# Patient Record
Sex: FEMALE | Race: WHITE | NOT HISPANIC OR LATINO | Employment: OTHER | URBAN - METROPOLITAN AREA
[De-identification: names, ages, dates, MRNs, and addresses within clinical notes are randomized per-mention and may not be internally consistent; named-entity substitution may affect disease eponyms.]

---

## 2017-03-16 NOTE — PROGRESS NOTES
SUBJECTIVE:                                                            Nina Villalpando is a 86 year old female who presents for Preventive Visit.    Are you in the first 12 months of your Medicare coverage?  No    Physical   Annual:     Getting at least 3 servings of Calcium per day::  Yes    Bi-annual eye exam::  NO    Dental care twice a year::  NO    Sleep apnea or symptoms of sleep apnea::  None    Diet::  Regular (no restrictions)    Frequency of exercise::  None    Taking medications regularly::  Yes    Medication side effects::  None    Additional concerns today::  No      COGNITIVE SCREEN  1) Repeat 3 items (Banana, Sunrise, Chair)    2) Clock draw: NORMAL  3) 3 item recall: Recalls 3 objects  Results: 3 items recalled: COGNITIVE IMPAIRMENT LESS LIKELY    Mini-CogTM Copyright S Viraj. Licensed by the author for use in [facility] [employer]; reprinted with permission (janet@[facility]). All rights reserved.        Reviewed and updated as needed this visit by clinical staff  Tobacco  Allergies  Meds  Problems  Med Hx  Surg Hx  Fam Hx  Soc Hx          Reviewed and updated as needed this visit by Provider  Allergies  Meds  Problems        Social History   Substance Use Topics     Smoking status: Never Smoker     Smokeless tobacco: Never Used      Comment: no smokers in household     Alcohol use Yes      Comment: marcelo/waterx1-2/x1-2 q couple of months       The patient does not drink >3 drinks per day nor >7 drinks per week.        Today's PHQ-2 Score:   PHQ-2 ( 1999 Pfizer) 8/26/2016   Q1: Little interest or pleasure in doing things 0   Q2: Feeling down, depressed or hopeless 0   PHQ-2 Score 0       Do you feel safe in your environment - Yes    Do you have a Health Care Directive?: No: Advance care planning reviewed with patient; information given to patient to review.    Current providers sharing in care for this patient include:   Patient Care Team:  Patricia Norman MD as PCP -  "General      Hearing impairment: No    Ability to successfully perform activities of daily living: Yes, no assistance needed     Fall risk:  Completed 7 months ago    Home safety:  none identified      The following health maintenance items are reviewed in Epic and correct as of today:  Health Maintenance   Topic Date Due     ADVANCE DIRECTIVE PLANNING Q5 YRS (NO INBASKET)  10/27/2016     FALL RISK ASSESSMENT  07/29/2017     BMP Q1 YR (NO INBASKET)  08/26/2017     LIPID MONITORING Q1 YEAR( NO INBASKET)  08/26/2017     INFLUENZA VACCINE (SYSTEM ASSIGNED)  09/01/2017     TETANUS IMMUNIZATION (SYSTEM ASSIGNED)  10/13/2019     DEXA Q5 YR  08/26/2021     PNEUMOCOCCAL  Completed       ROS:  C: NEGATIVE for fever, chills, change in weight  I: NEGATIVE for worrisome rashes, moles or lesions  E: NEGATIVE for vision changes or irritation  E/M: complains of post nasal drip, nasal congestion, feels it has been going on for over a year.  Nose sprays give her headaches.  R: NEGATIVE for significant cough or SOB  B: NEGATIVE for masses, tenderness or discharge  CV: NEGATIVE for chest pain, palpitations or peripheral edema  GI: NEGATIVE for nausea, abdominal pain, heartburn, or change in bowel habits  : NEGATIVE for frequency, dysuria, or hematuria  M: NEGATIVE for significant arthralgias or myalgia  N: NEGATIVE for weakness, dizziness or paresthesias  E: NEGATIVE for temperature intolerance, skin/hair changes  H: NEGATIVE for bleeding problems  P: NEGATIVE for changes in mood or affect    OBJECTIVE:                                                            /72  Pulse 64  Temp 97.8  F (36.6  C) (Temporal)  Resp 16  Wt 172 lb (78 kg)  BMI 33.75 kg/m2 Estimated body mass index is 33.75 kg/(m^2) as calculated from the following:    Height as of 8/26/16: 4' 11.86\" (1.52 m).    Weight as of this encounter: 172 lb (78 kg).  EXAM:   GENERAL: healthy, alert and no distress  EYES: Eyes grossly normal to inspection, PERRL and " "conjunctivae and sclerae normal  HENT: ear canals and TM's normal, nose and mouth without ulcers or lesions  NECK: no adenopathy, no asymmetry, masses, or scars and thyroid normal to palpation  RESP: lungs clear to auscultation - no rales, rhonchi or wheezes  CV: regular rate and rhythm, 3/6 systolic murmur, has 2+ edema bilaterally  ABDOMEN: soft, nontender, no hepatosplenomegaly, no masses and bowel sounds normal  MS: no gross musculoskeletal defects noted, no edema  SKIN: no suspicious lesions or rashes  NEURO: Normal strength and tone, mentation intact and speech normal  PSYCH: mentation appears normal, affect normal/bright    ASSESSMENT / PLAN:                                                            1. Encounter for routine adult health examination without abnormal findings      2. HTN, goal below 150/90  Poorly controlled, asymptomatic.  Will increase her hydralazine from twice daily to three times daily and follow up in 1 month.    - hydrALAZINE (APRESOLINE) 50 MG tablet; Take 1 tablet (50 mg) by mouth 3 times daily  Dispense: 270 tablet; Refill: 1  - Comprehensive metabolic panel  - TSH with free T4 reflex  - CBC with platelets    End of Life Planning:  Patient currently has an advanced directive: No.  I have verified the patient's ablity to prepare an advanced directive/make health care decisions.  Literature was provided to assist patient in preparing an advanced directive.    COUNSELING:  Reviewed preventive health counseling, as reflected in patient instructions        Estimated body mass index is 33.75 kg/(m^2) as calculated from the following:    Height as of 8/26/16: 4' 11.86\" (1.52 m).    Weight as of this encounter: 172 lb (78 kg).  Weight management plan: Discussed healthy diet and exercise guidelines and patient will follow up in 12 months in clinic to re-evaluate.   reports that she has never smoked. She has never used smokeless tobacco.      Appropriate preventive services were discussed with " this patient, including applicable screening as appropriate for cardiovascular disease, diabetes, osteopenia/osteoporosis, and glaucoma.  As appropriate for age/gender, discussed screening for colorectal cancer, prostate cancer, breast cancer, and cervical cancer. Checklist reviewing preventive services available has been given to the patient.    Reviewed patients plan of care and provided an AVS. The Basic Care Plan (routine screening as documented in Health Maintenance) for Nina meets the Care Plan requirement. This Care Plan has been established and reviewed with the Patient.    Counseling Resources:  ATP IV Guidelines  Pooled Cohorts Equation Calculator  Breast Cancer Risk Calculator  FRAX Risk Assessment  ICSI Preventive Guidelines  Dietary Guidelines for Americans, 2010  USDA's MyPlate  ASA Prophylaxis  Lung CA Screening    Patricia Norman MD  [facility]

## 2017-03-21 ENCOUNTER — OFFICE VISIT (OUTPATIENT)
Dept: FAMILY MEDICINE | Facility: OTHER | Age: 82
End: 2017-03-21
Payer: COMMERCIAL

## 2017-03-21 VITALS
TEMPERATURE: 97.8 F | HEART RATE: 64 BPM | DIASTOLIC BLOOD PRESSURE: 72 MMHG | RESPIRATION RATE: 16 BRPM | BODY MASS INDEX: 33.75 KG/M2 | WEIGHT: 172 LBS | SYSTOLIC BLOOD PRESSURE: 180 MMHG

## 2017-03-21 DIAGNOSIS — Z00.00 ENCOUNTER FOR ROUTINE ADULT HEALTH EXAMINATION WITHOUT ABNORMAL FINDINGS: Primary | ICD-10-CM

## 2017-03-21 DIAGNOSIS — I10 HTN, GOAL BELOW 150/90: ICD-10-CM

## 2017-03-21 LAB
ERYTHROCYTE [DISTWIDTH] IN BLOOD BY AUTOMATED COUNT: 13.2 % (ref 10–15)
HCT VFR BLD AUTO: 38.4 % (ref 35–47)
HGB BLD-MCNC: 12.9 G/DL (ref 11.7–15.7)
MCH RBC QN AUTO: 31.8 PG (ref 26.5–33)
MCHC RBC AUTO-ENTMCNC: 33.6 G/DL (ref 31.5–36.5)
MCV RBC AUTO: 95 FL (ref 78–100)
PLATELET # BLD AUTO: 339 10E9/L (ref 150–450)
RBC # BLD AUTO: 4.06 10E12/L (ref 3.8–5.2)
WBC # BLD AUTO: 7.8 10E9/L (ref 4–11)

## 2017-03-21 PROCEDURE — 84443 ASSAY THYROID STIM HORMONE: CPT | Performed by: FAMILY MEDICINE

## 2017-03-21 PROCEDURE — G0439 PPPS, SUBSEQ VISIT: HCPCS | Performed by: FAMILY MEDICINE

## 2017-03-21 PROCEDURE — 36415 COLL VENOUS BLD VENIPUNCTURE: CPT | Performed by: FAMILY MEDICINE

## 2017-03-21 PROCEDURE — 85027 COMPLETE CBC AUTOMATED: CPT | Performed by: FAMILY MEDICINE

## 2017-03-21 PROCEDURE — 80053 COMPREHEN METABOLIC PANEL: CPT | Performed by: FAMILY MEDICINE

## 2017-03-21 RX ORDER — HYDRALAZINE HYDROCHLORIDE 50 MG/1
50 TABLET, FILM COATED ORAL 3 TIMES DAILY
Qty: 270 TABLET | Refills: 1 | Status: SHIPPED | OUTPATIENT
Start: 2017-03-21 | End: 2017-04-25

## 2017-03-21 ASSESSMENT — PAIN SCALES - GENERAL: PAINLEVEL: NO PAIN (0)

## 2017-03-21 NOTE — MR AVS SNAPSHOT
After Visit Summary   3/21/2017    Nina Villalpando    MRN: [id number]           Patient Information     Date Of Birth          9/21/1930        Visit Information        Provider Department      3/21/2017 1:40 PM Patricia Norman MD [facility]        Today's Diagnoses     Encounter for routine adult health examination without abnormal findings    -  1    HTN, goal below 150/90          Care Instructions      Preventive Health Recommendations    Female Ages 65 +    Yearly exam:     See your health care provider every year in order to  o Review health changes.   o Discuss preventive care.    o Review your medicines if your doctor has prescribed any.      You no longer need a yearly Pap test unless you've had an abnormal Pap test in the past 10 years. If you have vaginal symptoms, such as bleeding or discharge, be sure to talk with your provider about a Pap test.      Every 1 to 2 years, have a mammogram.  If you are over 69, talk with your health care provider about whether or not you want to continue having screening mammograms.      Every 10 years, have a colonoscopy. Or, have a yearly FIT test (stool test). These exams will check for colon cancer.       Have a cholesterol test every 5 years, or more often if your doctor advises it.       Have a diabetes test (fasting glucose) every three years. If you are at risk for diabetes, you should have this test more often.       At age 65, have a bone density scan (DEXA) to check for osteoporosis (brittle bone disease).    Shots:    Get a flu shot each year.    Get a tetanus shot every 10 years.    Talk to your doctor about your pneumonia vaccines. There are now two you should receive - Pneumovax (PPSV 23) and Prevnar (PCV 13).    Talk to your doctor about the shingles vaccine.    Talk to your doctor about the hepatitis B vaccine.    Nutrition:     Eat at least 5 servings of fruits and vegetables each day.      Eat whole-grain bread,  "whole-wheat pasta and brown rice instead of white grains and rice.      Talk to your provider about Calcium and Vitamin D.     Lifestyle    Exercise at least 150 minutes a week (30 minutes a day, 5 days a week). This will help you control your weight and prevent disease.      Limit alcohol to one drink per day.      No smoking.       Wear sunscreen to prevent skin cancer.       See your dentist twice a year for an exam and cleaning.      See your eye doctor every 1 to 2 years to screen for conditions such as glaucoma, macular degeneration and cataracts.        Follow-ups after your visit        Follow-up notes from your care team     Return in about 4 weeks (around 4/18/2017) for BP Recheck.      Who to contact     If you have questions or need follow up information about today's clinic visit or your schedule please contact [facility] directly at [phone].  Normal or non-critical lab and imaging results will be communicated to you by MyChart, letter or phone within 4 business days after the clinic has received the results. If you do not hear from us within 7 days, please contact the clinic through [facility] or phone. If you have a critical or abnormal lab result, we will notify you by phone as soon as possible.  Submit refill requests through Backand or call your pharmacy and they will forward the refill request to us. Please allow 3 business days for your refill to be completed.          Additional Information About Your Visit        [facility] Information     Backand lets you send messages to your doctor, view your test results, renew your prescriptions, schedule appointments and more. To sign up, go to [email or web address] . Click on \"Log in\" on the left side of the screen, which will take you to the Welcome page. Then click on \"Sign up Now\" on the right side of the page.     You will be asked to enter the access code listed below, as well as some personal information. Please follow the " directions to create your username and password.     Your access code is: [email or web address]  Expires: 2017  2:11 PM     Your access code will  in 90 days. If you need help or a new code, please call your [facility] or [phone].        Care EveryWhere ID     This is your Care EveryWhere ID. This could be used by other organizations to access your [place] medical records  [id number]        Your Vitals Were     Pulse Temperature Respirations BMI (Body Mass Index)          64 97.8  F (36.6  C) (Temporal) 16 33.75 kg/m2         Blood Pressure from Last 3 Encounters:   17 180/72   16 150/62   16 146/68    Weight from Last 3 Encounters:   17 172 lb (78 kg)   16 165 lb (74.8 kg)   16 167 lb (75.8 kg)              We Performed the Following     CBC with platelets     Comprehensive metabolic panel     TSH with free T4 reflex          Today's Medication Changes          These changes are accurate as of: 3/21/17  2:11 PM.  If you have any questions, ask your nurse or doctor.               These medicines have changed or have updated prescriptions.        Dose/Directions    hydrALAZINE 50 MG tablet   Commonly known as:  APRESOLINE   This may have changed:  when to take this   Used for:  HTN, goal below 150/90   Changed by:  Patricia Norman MD        Dose:  50 mg   Take 1 tablet (50 mg) by mouth 3 times daily   Quantity:  270 tablet   Refills:  1            Where to get your medicines      These medications were sent to [facility] [address] [place] [address] [address] [address]     Phone:  [phone]     hydrALAZINE 50 MG tablet                Primary Care Provider Office Phone # Fax #    Patricia Norman [phone]       [facility] [address]  [facility] [address]        Thank you!     Thank you for choosing [facility]  for your care. Our goal is always to provide you with  excellent care. Hearing back from our patients is one way we can continue to improve our services. Please take a few minutes to complete the written survey that you may receive in the mail after your visit with us. Thank you!             Your Updated Medication List - Protect others around you: Learn how to safely use, store and throw away your medicines at www.disposemymeds.org.          This list is accurate as of: 3/21/17  2:11 PM.  Always use your most recent med list.                   Brand Name Dispense Instructions for use    aspirin 81 MG tablet      Take 1 tablet by mouth daily.       atorvastatin 20 MG tablet    LIPITOR    90 tablet    Take 1 tablet (20 mg) by mouth daily       biotin 1000 MCG Tabs tablet      Take 1 tablet by mouth daily.       cloNIDine 0.2 MG tablet    CATAPRES    180 tablet    Take 1 tablet (0.2 mg) by mouth 2 times daily       COMPRESSION STOCKINGS     2 each    Lower extremities compression stockings Below the knees Mild pressure 15-20 MMHG       Glucosamine-Chondroitin Tabs     0    1 tablet qd       hydrALAZINE 50 MG tablet    APRESOLINE    270 tablet    Take 1 tablet (50 mg) by mouth 3 times daily       metoprolol 100 MG 24 hr tablet    TOPROL-XL    135 tablet    TAKE ONE & ONE-HALF TABLETS BY MOUTH ONCE DAILY AT BEDTIME       MULTI-DAY Tabs          order for DME     1 Device    Equipment being ordered: Blood pressure monitor       vitamin D 1000 UNITS capsule      Take 1 capsule by mouth daily Not taking every day       vitamin E 400 UNIT capsule     0    1 qd

## 2017-03-21 NOTE — NURSING NOTE
"Chief Complaint   Patient presents with     Physical     Panel Management       Initial /80  Pulse 64  Temp 97.8  F (36.6  C) (Temporal)  Resp 16  Wt 172 lb (78 kg)  BMI 33.75 kg/m2 Estimated body mass index is 33.75 kg/(m^2) as calculated from the following:    Height as of 8/26/16: 4' 11.86\" (1.52 m).    Weight as of this encounter: 172 lb (78 kg).  Medication Reconciliation: complete   Lavern Carias CMA    "

## 2017-03-22 LAB
ALBUMIN SERPL-MCNC: 3.6 G/DL (ref 3.4–5)
ALP SERPL-CCNC: 111 U/L (ref 40–150)
ALT SERPL W P-5'-P-CCNC: 16 U/L (ref 0–50)
ANION GAP SERPL CALCULATED.3IONS-SCNC: 9 MMOL/L (ref 3–14)
AST SERPL W P-5'-P-CCNC: 27 U/L (ref 0–45)
BILIRUB SERPL-MCNC: 0.4 MG/DL (ref 0.2–1.3)
BUN SERPL-MCNC: 18 MG/DL (ref 7–30)
CALCIUM SERPL-MCNC: 9.6 MG/DL (ref 8.5–10.1)
CHLORIDE SERPL-SCNC: 99 MMOL/L (ref 94–109)
CO2 SERPL-SCNC: 27 MMOL/L (ref 20–32)
CREAT SERPL-MCNC: 0.82 MG/DL (ref 0.52–1.04)
GFR SERPL CREATININE-BSD FRML MDRD: 66 ML/MIN/1.7M2
GLUCOSE SERPL-MCNC: 100 MG/DL (ref 70–99)
POTASSIUM SERPL-SCNC: 4 MMOL/L (ref 3.4–5.3)
PROT SERPL-MCNC: 7.6 G/DL (ref 6.8–8.8)
SODIUM SERPL-SCNC: 135 MMOL/L (ref 133–144)
TSH SERPL DL<=0.005 MIU/L-ACNC: 3.22 MU/L (ref 0.4–4)

## 2017-04-25 ENCOUNTER — OFFICE VISIT (OUTPATIENT)
Dept: FAMILY MEDICINE | Facility: OTHER | Age: 82
End: 2017-04-25
Payer: COMMERCIAL

## 2017-04-25 VITALS
RESPIRATION RATE: 16 BRPM | SYSTOLIC BLOOD PRESSURE: 140 MMHG | BODY MASS INDEX: 33.77 KG/M2 | DIASTOLIC BLOOD PRESSURE: 72 MMHG | HEART RATE: 64 BPM | TEMPERATURE: 96.7 F | WEIGHT: 172 LBS | HEIGHT: 60 IN

## 2017-04-25 DIAGNOSIS — J31.0 CHRONIC RHINITIS: ICD-10-CM

## 2017-04-25 DIAGNOSIS — E78.5 HYPERLIPIDEMIA, UNSPECIFIED: ICD-10-CM

## 2017-04-25 DIAGNOSIS — I10 HTN, GOAL BELOW 150/90: Primary | ICD-10-CM

## 2017-04-25 PROCEDURE — 99214 OFFICE O/P EST MOD 30 MIN: CPT | Performed by: FAMILY MEDICINE

## 2017-04-25 RX ORDER — CLONIDINE HYDROCHLORIDE 0.2 MG/1
0.2 TABLET ORAL 2 TIMES DAILY
Qty: 180 TABLET | Refills: 3 | Status: SHIPPED | OUTPATIENT
Start: 2017-04-25 | End: 2018-09-25

## 2017-04-25 RX ORDER — ATORVASTATIN CALCIUM 20 MG/1
20 TABLET, FILM COATED ORAL DAILY
Qty: 90 TABLET | Refills: 3 | Status: SHIPPED | OUTPATIENT
Start: 2017-04-25 | End: 2020-01-06

## 2017-04-25 RX ORDER — HYDRALAZINE HYDROCHLORIDE 50 MG/1
50 TABLET, FILM COATED ORAL 3 TIMES DAILY
Qty: 270 TABLET | Refills: 1 | Status: SHIPPED | OUTPATIENT
Start: 2017-04-25 | End: 2018-09-25

## 2017-04-25 RX ORDER — METOPROLOL SUCCINATE 100 MG/1
TABLET, EXTENDED RELEASE ORAL
Qty: 135 TABLET | Refills: 3 | Status: SHIPPED | OUTPATIENT
Start: 2017-04-25 | End: 2018-06-04

## 2017-04-25 NOTE — NURSING NOTE
"Chief Complaint   Patient presents with     Hypertension     Panel Management     honoring choices, ht       Initial /82  Pulse 64  Temp 96.7  F (35.9  C) (Temporal)  Resp 16  Ht 4' 11.96\" (1.523 m)  Wt 172 lb (78 kg)  BMI 33.64 kg/m2 Estimated body mass index is 33.64 kg/(m^2) as calculated from the following:    Height as of this encounter: 4' 11.96\" (1.523 m).    Weight as of this encounter: 172 lb (78 kg).  Medication Reconciliation: complete   Lavern Carias CMA    "

## 2017-04-25 NOTE — MR AVS SNAPSHOT
"              After Visit Summary   2017    Nina Villalpando    MRN: [id number]           Patient Information     Date Of Birth          1930        Visit Information        Provider Department      2017 11:40 AM Patricia Norman MD [facility]        Today's Diagnoses     HTN, goal below 150/90    -  1    Chronic rhinitis        Hyperlipidemia, unspecified          Care Instructions    Try nasal saline spray.        Follow-ups after your visit        Who to contact     If you have questions or need follow up information about today's clinic visit or your schedule please contact [facility] directly at [phone].  Normal or non-critical lab and imaging results will be communicated to you by MyChart, letter or phone within 4 business days after the clinic has received the results. If you do not hear from us within 7 days, please contact the clinic through MyChart or phone. If you have a critical or abnormal lab result, we will notify you by phone as soon as possible.  Submit refill requests through Tonx or call your pharmacy and they will forward the refill request to us. Please allow 3 business days for your refill to be completed.          Additional Information About Your Visit        MyChart Information     Tonx lets you send messages to your doctor, view your test results, renew your prescriptions, schedule appointments and more. To sign up, go to [email or web address] . Click on \"Log in\" on the left side of the screen, which will take you to the Welcome page. Then click on \"Sign up Now\" on the right side of the page.     You will be asked to enter the access code listed below, as well as some personal information. Please follow the directions to create your username and password.     Your access code is: [email or web address]  Expires: 2017  2:11 PM     Your access code will  in 90 days. If you need help or a new code, please call your [place] " "clinic or [phone].        Care EveryWhere ID     This is your Care EveryWhere ID. This could be used by other organizations to access your [place] medical records  [id number]        Your Vitals Were     Pulse Temperature Respirations Height BMI (Body Mass Index)       64 96.7  F (35.9  C) (Temporal) 16 4' 11.96\" (1.523 m) 33.64 kg/m2        Blood Pressure from Last 3 Encounters:   04/25/17 140/72   03/21/17 180/72   08/26/16 150/62    Weight from Last 3 Encounters:   04/25/17 172 lb (78 kg)   03/21/17 172 lb (78 kg)   08/26/16 165 lb (74.8 kg)              Today, you had the following     No orders found for display         Where to get your medicines      These medications were sent to [facility] [address] [place] [address] [facility]  [address] [facility] [address]     Phone:  [phone]     atorvastatin 20 MG tablet    cloNIDine 0.2 MG tablet    hydrALAZINE 50 MG tablet    metoprolol 100 MG 24 hr tablet          Primary Care Provider Office Phone # Fax #    Patricia Norman [phone]       [facility] [address]  [facility] [address]        Thank you!     Thank you for choosing [facility]  for your care. Our goal is always to provide you with excellent care. Hearing back from our patients is one way we can continue to improve our services. Please take a few minutes to complete the written survey that you may receive in the mail after your visit with us. Thank you!             Your Updated Medication List - Protect others around you: Learn how to safely use, store and throw away your medicines at www.disposemymeds.org.          This list is accurate as of: 4/25/17 12:05 PM.  Always use your most recent med list.                   Brand Name Dispense Instructions for use    aspirin 81 MG tablet      Take 1 tablet by mouth daily.       atorvastatin 20 MG tablet    LIPITOR    90 tablet    Take 1 tablet (20 mg) by mouth daily       " biotin 1000 MCG Tabs tablet      Take 1 tablet by mouth daily.       cloNIDine 0.2 MG tablet    CATAPRES    180 tablet    Take 1 tablet (0.2 mg) by mouth 2 times daily       COMPRESSION STOCKINGS     2 each    Lower extremities compression stockings Below the knees Mild pressure 15-20 MMHG       Glucosamine-Chondroitin Tabs     0    1 tablet qd       hydrALAZINE 50 MG tablet    APRESOLINE    270 tablet    Take 1 tablet (50 mg) by mouth 3 times daily       metoprolol 100 MG 24 hr tablet    TOPROL-XL    135 tablet    TAKE ONE & ONE-HALF TABLETS BY MOUTH ONCE DAILY AT BEDTIME       MULTI-DAY Tabs          order for DME     1 Device    Equipment being ordered: Blood pressure monitor       vitamin D 1000 UNITS capsule      Take 1 capsule by mouth daily Not taking every day       vitamin E 400 UNIT capsule     0    1 qd

## 2017-06-25 DIAGNOSIS — I10 HTN, GOAL BELOW 150/90: ICD-10-CM

## 2017-06-26 NOTE — TELEPHONE ENCOUNTER
Hydralazine      Last Written Prescription Date: 04/25/2017  Last Fill Quantity: 270, # refills: 1    Last Office Visit with [facility], [facility] or [facility] prescribing provider:  04/25/2017   Future Office Visit:        BP Readings from Last 3 Encounters:   04/25/17 140/72   03/21/17 180/72   08/26/16 150/62     Veronique Ramos MA  June 26, 2017

## 2017-06-27 RX ORDER — HYDRALAZINE HYDROCHLORIDE 50 MG/1
TABLET, FILM COATED ORAL
Qty: 180 TABLET | Refills: 0 | OUTPATIENT
Start: 2017-06-27

## 2017-08-08 NOTE — PROGRESS NOTES
"  SUBJECTIVE:                                                    Nina Villalpando is a 86 year old female who presents to clinic today for the following health issues:      HPI    Joint Pain    Onset: 3 weeks    Description:   Location: both arms and shoulders  Character: Sore, aching quality, no weakness    Intensity: mild, moderate    Progression of Symptoms: better    Accompanying Signs & Symptoms:  Other symptoms: none    History:   Previous similar pain: no       Precipitating factors:   Trauma or overuse: no, has not been lifting anything heavy, cleaning, working outside, gardening, etc or anything that would be cause for pain.    Alleviating factors:  Improved by: nothing    Therapies Tried and outcome: None    No shortness of breath, chest pain, activity intolerance, increased fatigue.  No weakness  No recent injuries  No chest pain or shortness of breath   History of arthritis, mostly in knees  Swelling in both lower extremities, chronic, slight worsening recently.  History of high blood pressure - no recent changes in medications, most often taking hydralazine twice daily instead of 3 times daily  Last visit with PCP was in April  History of hyperlipidemia - on statin Lipitor 20 mg daily    Problem list and histories reviewed & adjusted, as indicated.  Additional history: as documented    Labs reviewed in EPIC    ROS:  Constitutional, HEENT, cardiovascular, pulmonary, gi and gu systems are negative, except as otherwise noted.      OBJECTIVE:   /78  Pulse 61  Temp 98  F (36.7  C) (Oral)  Resp 12  Ht 4' 11.76\" (1.518 m)  Wt 165 lb (74.8 kg)  SpO2 98%  BMI 32.48 kg/m2  Body mass index is 32.48 kg/(m^2).  GENERAL: healthy, alert and no distress  NECK: no adenopathy, no asymmetry, masses, or scars and thyroid normal to palpation  RESP: lungs clear to auscultation - no rales, rhonchi or wheezes  CV: regular rate and rhythm, normal S1 S2, no S3 or S4, no murmur, click or rub, no peripheral edema " and peripheral pulses strong  ABDOMEN: soft, nontender, no hepatosplenomegaly, no masses and bowel sounds normal  MS: no gross musculoskeletal defects noted, no edema  SKIN: no suspicious lesions or rashes  NEURO: Normal strength and tone, mentation intact and speech normal  PSYCH: mentation appears normal, affect normal/bright    Diagnostic Test Results:  Results for orders placed or performed in visit on 08/09/17   ESR: Erythrocyte sedimentation rate   Result Value Ref Range    Sed Rate 27 0 - 30 mm/h   CRP, inflammation   Result Value Ref Range    CRP Inflammation 6.4 0.0 - 8.0 mg/L   CBC with platelets   Result Value Ref Range    WBC 9.3 4.0 - 11.0 10e9/L    RBC Count 3.94 3.8 - 5.2 10e12/L    Hemoglobin 12.7 11.7 - 15.7 g/dL    Hematocrit 37.7 35.0 - 47.0 %    MCV 96 78 - 100 fl    MCH 32.2 26.5 - 33.0 pg    MCHC 33.7 31.5 - 36.5 g/dL    RDW 13.5 10.0 - 15.0 %    Platelet Count 322 150 - 450 10e9/L   Comprehensive metabolic panel (BMP + Alb, Alk Phos, ALT, AST, Total. Bili, TP)   Result Value Ref Range    Sodium 137 133 - 144 mmol/L    Potassium 4.0 3.4 - 5.3 mmol/L    Chloride 102 94 - 109 mmol/L    Carbon Dioxide 28 20 - 32 mmol/L    Anion Gap 7 3 - 14 mmol/L    Glucose 91 70 - 99 mg/dL    Urea Nitrogen 24 7 - 30 mg/dL    Creatinine 0.95 0.52 - 1.04 mg/dL    GFR Estimate 55 (L) >60 mL/min/1.7m2    GFR Estimate If Black 67 >60 mL/min/1.7m2    Calcium 8.7 8.5 - 10.1 mg/dL    Bilirubin Total 0.5 0.2 - 1.3 mg/dL    Albumin 3.5 3.4 - 5.0 g/dL    Protein Total 7.5 6.8 - 8.8 g/dL    Alkaline Phosphatase 91 40 - 150 U/L    ALT 14 0 - 50 U/L    AST 24 0 - 45 U/L   Lactate Dehydrogenase   Result Value Ref Range    Lactate Dehydrogenase 228 81 - 234 U/L   BNP-N terminal pro   Result Value Ref Range    N-Terminal Pro Bnp 1289 (H) 0 - 450 pg/mL       ASSESSMENT/PLAN:     1. Acute pain of both shoulders  Patient presents with 3 weeks of aching in bilateral shoulders and upper arms.  Denies weakness.  No recent illness.  Denies chest pain or shortness of breath.  Her EKG was   - ESR: Erythrocyte sedimentation rate  - CRP, inflammation  - CBC with platelets  - Comprehensive metabolic panel (BMP + Alb, Alk Phos, ALT, AST, Total. Bili, TP)  - Lactate Dehydrogenase  - EKG 12-lead complete w/read - Clinics    2. Essential hypertension  Well controlled    3. Callus of foot  Callus on left medial foot that is quite thick. Recommend debridement by podiatry.   - ORTHO  REFERRAL    4. Mixed hyperlipidemia  Well controlled on atorvastatin 20 mg daily.  Recent Labs   Lab Test  08/26/16   1018  05/21/15   1125  01/22/14   1039   CHOL  122  128  138   HDL  57  64  58   LDL  47  48  62   TRIG  91  79  88   CHOLHDLRATIO   --   2.0  2.0         5. Pain in both upper arms  Unclear cause of pain in bilateral upper arms.  Polymyalgia rheumatica vs deconditioning vs overuse vs other autoimmune condition.  Will check blood work and discuss plan based on results.  - ESR: Erythrocyte sedimentation rate  - CRP, inflammation  - CBC with platelets  - Comprehensive metabolic panel (BMP + Alb, Alk Phos, ALT, AST, Total. Bili, TP)  - Lactate Dehydrogenase    6. Swelling of both lower extremities  Patient has had increased swelling in lower extremities.  She is on hydralazine which is a vasodilator and can contribute to this.  She denies chest pain, shortness of breath, activity intolerance.  Her blood pressure is currently well controlled.  She may benefit from using her compression stockings.  - BNP-N terminal pro    7. Primary osteoarthritis of both knees  Chronic pain in both knees that she tolerates.  Pain is stable.    NATALIA Palafox [facility]

## 2017-08-09 ENCOUNTER — OFFICE VISIT (OUTPATIENT)
Dept: FAMILY MEDICINE | Facility: OTHER | Age: 82
End: 2017-08-09
Payer: COMMERCIAL

## 2017-08-09 DIAGNOSIS — M25.511 ACUTE PAIN OF BOTH SHOULDERS: Primary | ICD-10-CM

## 2017-08-09 DIAGNOSIS — L84 CALLUS OF FOOT: ICD-10-CM

## 2017-08-09 DIAGNOSIS — M25.512 ACUTE PAIN OF BOTH SHOULDERS: Primary | ICD-10-CM

## 2017-08-09 DIAGNOSIS — M79.89 SWELLING OF BOTH LOWER EXTREMITIES: ICD-10-CM

## 2017-08-09 DIAGNOSIS — E78.2 MIXED HYPERLIPIDEMIA: ICD-10-CM

## 2017-08-09 DIAGNOSIS — I10 ESSENTIAL HYPERTENSION: ICD-10-CM

## 2017-08-09 DIAGNOSIS — M17.0 PRIMARY OSTEOARTHRITIS OF BOTH KNEES: ICD-10-CM

## 2017-08-09 DIAGNOSIS — M79.622 PAIN IN BOTH UPPER ARMS: ICD-10-CM

## 2017-08-09 DIAGNOSIS — M79.621 PAIN IN BOTH UPPER ARMS: ICD-10-CM

## 2017-08-09 LAB
ERYTHROCYTE [DISTWIDTH] IN BLOOD BY AUTOMATED COUNT: 13.5 % (ref 10–15)
ERYTHROCYTE [SEDIMENTATION RATE] IN BLOOD BY WESTERGREN METHOD: 27 MM/H (ref 0–30)
HCT VFR BLD AUTO: 37.7 % (ref 35–47)
HGB BLD-MCNC: 12.7 G/DL (ref 11.7–15.7)
MCH RBC QN AUTO: 32.2 PG (ref 26.5–33)
MCHC RBC AUTO-ENTMCNC: 33.7 G/DL (ref 31.5–36.5)
MCV RBC AUTO: 96 FL (ref 78–100)
PLATELET # BLD AUTO: 322 10E9/L (ref 150–450)
RBC # BLD AUTO: 3.94 10E12/L (ref 3.8–5.2)
WBC # BLD AUTO: 9.3 10E9/L (ref 4–11)

## 2017-08-09 PROCEDURE — 93000 ELECTROCARDIOGRAM COMPLETE: CPT | Performed by: STUDENT IN AN ORGANIZED HEALTH CARE EDUCATION/TRAINING PROGRAM

## 2017-08-09 PROCEDURE — 85027 COMPLETE CBC AUTOMATED: CPT | Performed by: STUDENT IN AN ORGANIZED HEALTH CARE EDUCATION/TRAINING PROGRAM

## 2017-08-09 PROCEDURE — 99214 OFFICE O/P EST MOD 30 MIN: CPT | Performed by: STUDENT IN AN ORGANIZED HEALTH CARE EDUCATION/TRAINING PROGRAM

## 2017-08-09 PROCEDURE — 36415 COLL VENOUS BLD VENIPUNCTURE: CPT | Performed by: STUDENT IN AN ORGANIZED HEALTH CARE EDUCATION/TRAINING PROGRAM

## 2017-08-09 PROCEDURE — 83880 ASSAY OF NATRIURETIC PEPTIDE: CPT | Performed by: STUDENT IN AN ORGANIZED HEALTH CARE EDUCATION/TRAINING PROGRAM

## 2017-08-09 PROCEDURE — 85652 RBC SED RATE AUTOMATED: CPT | Performed by: STUDENT IN AN ORGANIZED HEALTH CARE EDUCATION/TRAINING PROGRAM

## 2017-08-09 PROCEDURE — 86140 C-REACTIVE PROTEIN: CPT | Performed by: STUDENT IN AN ORGANIZED HEALTH CARE EDUCATION/TRAINING PROGRAM

## 2017-08-09 PROCEDURE — 80053 COMPREHEN METABOLIC PANEL: CPT | Performed by: STUDENT IN AN ORGANIZED HEALTH CARE EDUCATION/TRAINING PROGRAM

## 2017-08-09 PROCEDURE — 83615 LACTATE (LD) (LDH) ENZYME: CPT | Performed by: STUDENT IN AN ORGANIZED HEALTH CARE EDUCATION/TRAINING PROGRAM

## 2017-08-09 ASSESSMENT — PAIN SCALES - GENERAL: PAINLEVEL: MILD PAIN (2)

## 2017-08-09 NOTE — MR AVS SNAPSHOT
After Visit Summary   8/9/2017    Nina Villalpando    MRN: [id number]           Patient Information     Date Of Birth          9/21/1930        Visit Information        Provider Department      8/9/2017 2:00 PM Promise Barroso APRN CNP [facility]        Today's Diagnoses     Callus of foot    -  1    Pain in both upper arms        Acute pain of both shoulders        Swelling of both lower extremities          Care Instructions    Blood work.    Podiatry referral.  Promise Barroso, NP-C            Follow-ups after your visit        Additional Services     ORTHO  REFERRAL       [facility] is referring you to the Orthopedic  Services at [facility].       The  Representative will assist you in the coordination of your Orthopedic and Musculoskeletal Care as prescribed by your physician.    The  Representative will call you within 1 business day to help schedule your appointment, or you may contact the  Representative at:    All areas ~ [phone]     Type of Referral : [facility]       Timeframe requested: Within 2 weeks    Coverage of these services is subject to the terms and limitations of your health insurance plan.  Please call member services at your health plan with any benefit or coverage questions.      If X-rays, CT or MRI's have been performed, please contact the facility where they were done to arrange for , prior to your scheduled appointment.  Please bring this referral request to your appointment and present it to your specialist.                  Who to contact     If you have questions or need follow up information about today's clinic visit or your schedule please contact [facility] directly at [phone].  Normal or non-critical lab and imaging results will be communicated to you by MyChart, letter or phone within 4  "business days after the clinic has received the results. If you do not hear from us within 7 days, please contact the clinic through KILTR or phone. If you have a critical or abnormal lab result, we will notify you by phone as soon as possible.  Submit refill requests through KILTR or call your pharmacy and they will forward the refill request to us. Please allow 3 business days for your refill to be completed.          Additional Information About Your Visit        KILTR Information     KILTR lets you send messages to your doctor, view your test results, renew your prescriptions, schedule appointments and more. To sign up, go to [email or web address] . Click on \"Log in\" on the left side of the screen, which will take you to the Welcome page. Then click on \"Sign up Now\" on the right side of the page.     You will be asked to enter the access code listed below, as well as some personal information. Please follow the directions to create your username and password.     Your access code is: [email or web address]  Expires: 2017  2:53 PM     Your access code will  in 90 days. If you need help or a new code, please call your [place] clinic or [phone].        Care EveryWhere ID     This is your Care EveryWhere ID. This could be used by other organizations to access your [place] medical records  [id number]        Your Vitals Were     Pulse Temperature Respirations Height Pulse Oximetry BMI (Body Mass Index)    61 98  F (36.7  C) (Oral) 12 4' 11.76\" (1.518 m) 98% 32.48 kg/m2       Blood Pressure from Last 3 Encounters:   17 146/58   17 140/72   17 180/72    Weight from Last 3 Encounters:   17 165 lb (74.8 kg)   17 172 lb (78 kg)   17 172 lb (78 kg)              We Performed the Following     BNP-N terminal pro     CBC with platelets     Comprehensive metabolic panel (BMP + Alb, Alk Phos, ALT, AST, Total. Bili, TP)     CRP, inflammation     EKG 12-lead complete w/read - " Clinics     ESR: Erythrocyte sedimentation rate     Lactate Dehydrogenase     ORTHO  REFERRAL        Primary Care Provider Office Phone # Fax #    Patricia TEE MD [place] [phone]       [address] [place] [id number]        Equal Access to Services     LEISA HAN : Hadii tarsha jameson hadholliso Soomaali, waaxda luqadaha, qaybta kaalmada adeegyada, waxnadege gutierresin hayseen michelle wilkinsonmikewendy brothers. So [facility] [phone].    ATENCIÓN: Si habla español, tiene a reaves disposición servicios gratuitos de asistencia lingüística. Llame al [phone].    We comply with applicable federal civil rights laws and [place] laws. We do not discriminate on the basis of race, color, national origin, age, disability sex, sexual orientation or gender identity.            Thank you!     Thank you for choosing [facility]  for your care. Our goal is always to provide you with excellent care. Hearing back from our patients is one way we can continue to improve our services. Please take a few minutes to complete the written survey that you may receive in the mail after your visit with us. Thank you!             Your Updated Medication List - Protect others around you: Learn how to safely use, store and throw away your medicines at www.disposemymeds.org.          This list is accurate as of: 8/9/17  2:53 PM.  Always use your most recent med list.                   Brand Name Dispense Instructions for use Diagnosis    aspirin 81 MG tablet      Take 1 tablet by mouth daily.        atorvastatin 20 MG tablet    LIPITOR    90 tablet    Take 1 tablet (20 mg) by mouth daily    Hyperlipidemia, unspecified       biotin 1000 MCG Tabs tablet      Take 1 tablet by mouth daily.        cloNIDine 0.2 MG tablet    CATAPRES    180 tablet    Take 1 tablet (0.2 mg) by mouth 2 times daily    HTN, goal below 150/90       COMPRESSION STOCKINGS     2 each    Lower extremities compression stockings Below the knees Mild  pressure 15-20 MMHG    Varicose veins of legs       Glucosamine-Chondroitin Tabs     0    1 tablet qd        hydrALAZINE 50 MG tablet    APRESOLINE    270 tablet    Take 1 tablet (50 mg) by mouth 3 times daily    HTN, goal below 150/90       metoprolol 100 MG 24 hr tablet    TOPROL-XL    135 tablet    TAKE ONE & ONE-HALF TABLETS BY MOUTH ONCE DAILY AT BEDTIME    HTN, goal below 150/90       MULTI-DAY Tabs           order for DME     1 Device    Equipment being ordered: Blood pressure monitor    HTN, goal below 150/90       vitamin D 1000 UNITS capsule      Take 1 capsule by mouth daily Not taking every day        vitamin E 400 UNIT capsule     0    1 qd

## 2017-08-10 LAB
ALBUMIN SERPL-MCNC: 3.5 G/DL (ref 3.4–5)
ALP SERPL-CCNC: 91 U/L (ref 40–150)
ALT SERPL W P-5'-P-CCNC: 14 U/L (ref 0–50)
ANION GAP SERPL CALCULATED.3IONS-SCNC: 7 MMOL/L (ref 3–14)
AST SERPL W P-5'-P-CCNC: 24 U/L (ref 0–45)
BILIRUB SERPL-MCNC: 0.5 MG/DL (ref 0.2–1.3)
BUN SERPL-MCNC: 24 MG/DL (ref 7–30)
CALCIUM SERPL-MCNC: 8.7 MG/DL (ref 8.5–10.1)
CHLORIDE SERPL-SCNC: 102 MMOL/L (ref 94–109)
CO2 SERPL-SCNC: 28 MMOL/L (ref 20–32)
CREAT SERPL-MCNC: 0.95 MG/DL (ref 0.52–1.04)
CRP SERPL-MCNC: 6.4 MG/L (ref 0–8)
GFR SERPL CREATININE-BSD FRML MDRD: 55 ML/MIN/1.7M2
GLUCOSE SERPL-MCNC: 91 MG/DL (ref 70–99)
LDH SERPL L TO P-CCNC: 228 U/L (ref 81–234)
NT-PROBNP SERPL-MCNC: 1289 PG/ML (ref 0–450)
POTASSIUM SERPL-SCNC: 4 MMOL/L (ref 3.4–5.3)
PROT SERPL-MCNC: 7.5 G/DL (ref 6.8–8.8)
SODIUM SERPL-SCNC: 137 MMOL/L (ref 133–144)

## 2017-08-14 VITALS
SYSTOLIC BLOOD PRESSURE: 124 MMHG | WEIGHT: 165 LBS | HEART RATE: 61 BPM | OXYGEN SATURATION: 98 % | RESPIRATION RATE: 12 BRPM | BODY MASS INDEX: 32.39 KG/M2 | HEIGHT: 60 IN | DIASTOLIC BLOOD PRESSURE: 78 MMHG | TEMPERATURE: 98 F

## 2017-08-22 ENCOUNTER — OFFICE VISIT (OUTPATIENT)
Dept: PODIATRY | Facility: OTHER | Age: 82
End: 2017-08-22
Payer: COMMERCIAL

## 2017-08-22 VITALS — WEIGHT: 165 LBS | BODY MASS INDEX: 32.39 KG/M2 | TEMPERATURE: 97.1 F | HEIGHT: 60 IN

## 2017-08-22 DIAGNOSIS — L60.3 ONYCHODYSTROPHY: ICD-10-CM

## 2017-08-22 DIAGNOSIS — L85.9 HYPERKERATOSIS: Primary | ICD-10-CM

## 2017-08-22 DIAGNOSIS — M20.42 HAMMERTOE OF LEFT FOOT: ICD-10-CM

## 2017-08-22 DIAGNOSIS — Z98.62 PERIPHERAL VASCULAR ANGIOPLASTY STATUS: ICD-10-CM

## 2017-08-22 PROCEDURE — 11056 PARNG/CUTG B9 HYPRKR LES 2-4: CPT | Performed by: PODIATRIST

## 2017-08-22 PROCEDURE — 99203 OFFICE O/P NEW LOW 30 MIN: CPT | Mod: 25 | Performed by: PODIATRIST

## 2017-08-22 PROCEDURE — 11721 DEBRIDE NAIL 6 OR MORE: CPT | Mod: 59 | Performed by: PODIATRIST

## 2017-08-22 ASSESSMENT — PAIN SCALES - GENERAL: PAINLEVEL: MODERATE PAIN (5)

## 2017-08-22 NOTE — NURSING NOTE
"Chief Complaint   Patient presents with     Consult     callus plantar Left great toe and heel; new pt - seen by Bert Albarado DPM 1/22/2014       Initial Temp 97.1  F (36.2  C) (Temporal)  Ht 4' 11.76\" (1.518 m)  Wt 165 lb (74.8 kg)  BMI 32.48 kg/m2 Estimated body mass index is 32.48 kg/(m^2) as calculated from the following:    Height as of this encounter: 4' 11.76\" (1.518 m).    Weight as of this encounter: 165 lb (74.8 kg).  BP completed using cuff size: NA (Not Taken)  Medication Reconciliation: complete    Nancy Jade CMA, August 22, 2017    "

## 2017-08-22 NOTE — LETTER
8/22/2017         [facility]  [address] [facility] [address]        Dear Colleague,    Thank you for referring your patient, Nina Villalpando, to the [facility]. Please see a copy of my visit note below.    HPI:  Nina Villalpando is a 86 year old female who is seen in consultation at the request of Promise Barroso CNP.    Pt presents for eval of:   (Onset, Location, L/R, Character, Treatments, Injury if yes)     Ongoing for past few years, increasing in size over past few months. Callus plantar Left great toe and heel. Dark discoloration noted throughout and it is nearly an inch baby. Also hammer toes  Thick, discolored toenails  Intermittent sharp, dull ache, pain 5 w/increase in activity    Retired.    Weight management plan: Patient was referred to their PCP to discuss a diet and exercise plan.    ROS:  10 point ROS neg other than the symptoms noted above in the HPI.    PAST MEDICAL HISTORY:   Past Medical History:   Diagnosis Date     Carpal tunnel syndrome     bilateral     Diverticulitis, colonic     years ago     Generalized osteoarthrosis, unspecified site      Hernia of unspecified site of abdominal cavity without mention of obstruction or gangrene     hiatal hernia, umbilical hernia     Other and unspecified hyperlipidemia      Unspecified essential hypertension      Unspecified sinusitis (chronic)     sinus problems        PAST SURGICAL HISTORY:   Past Surgical History:   Procedure Laterality Date     C NONSPECIFIC PROCEDURE  1980    Hysterectomy due to menometrorrhagia.  Bladder repair.     CATARACT IOL, RT/LT  6/26/2008    left eye     CATARACT IOL, RT/LT  7/24/2008    right eye     COLONOSCOPY  04/16/2007     HERNIA REPAIR, UMBILICAL  09/27/2007    Incarcerated.     LASER YAG CAPSULOTOMY  5/22/2014    Procedure: LASER YAG CAPSULOTOMY;  Surgeon: Hebert Ledbetter MD;  Location:  OR        MEDICATIONS:   Current Outpatient Prescriptions:       metoprolol (TOPROL-XL) 100 MG 24 hr tablet, TAKE ONE & ONE-HALF TABLETS BY MOUTH ONCE DAILY AT BEDTIME, Disp: 135 tablet, Rfl: 3     hydrALAZINE (APRESOLINE) 50 MG tablet, Take 1 tablet (50 mg) by mouth 3 times daily, Disp: 270 tablet, Rfl: 1     cloNIDine (CATAPRES) 0.2 MG tablet, Take 1 tablet (0.2 mg) by mouth 2 times daily, Disp: 180 tablet, Rfl: 3     atorvastatin (LIPITOR) 20 MG tablet, Take 1 tablet (20 mg) by mouth daily, Disp: 90 tablet, Rfl: 3     order for DME, Equipment being ordered: Blood pressure monitor, Disp: 1 Device, Rfl: 0     biotin 1000 MCG TABS tablet, Take 1 tablet by mouth daily., Disp: , Rfl:      COMPRESSION STOCKINGS, Lower extremities compression stockings Below the knees Mild pressure 15-20 MMHG    , Disp: 2 each, Rfl: 0     aspirin 81 MG tablet, Take 1 tablet by mouth daily., Disp: , Rfl:      Cholecalciferol (VITAMIN D) 1000 UNIT capsule, Take 1 capsule by mouth daily Not taking every day, Disp: , Rfl:      GLUCOSAMINE-CHONDROITIN OR TABS, 1 tablet qd, Disp: 0, Rfl: 0     VITAMIN E 400 UNIT OR CAPS, 1 qd , Disp: 0, Rfl: 0     MULTI-DAY OR TABS, , Disp: , Rfl: 0     ALLERGIES:    Allergies   Allergen Reactions     No Known Drug Allergies         SOCIAL HISTORY:   Social History     Social History     Marital status:      Spouse name: N/A     Number of children: 2     Years of education: N/A     Occupational History     retired Retired     Social History Main Topics     Smoking status: Never Smoker     Smokeless tobacco: Never Used      Comment: no smokers in household     Alcohol use Yes      Comment: marcelo/waterx1-2/x1-2 q couple of months     Drug use: No     Sexual activity: Yes     Partners: Male     Birth control/ protection: Surgical      Comment: partial hyst/bilateral ovaries remained     Other Topics Concern      Service No     Blood Transfusions No     Caffeine Concern Yes     4-5 cups coffee daily     Occupational Exposure No     Hobby Hazards No     Sleep  "Concern No     Stress Concern No     Weight Concern Yes     would like nohemy ose weight     Special Diet No     Back Care No     Exercise Yes     Bike Helmet No     doesn' t bike     Seat Belt Yes     Self-Exams Yes     Parent/Sibling W/ Cabg, Mi Or Angioplasty Before 65f 55m? No     Social History Narrative        FAMILY HISTORY:   Family History   Problem Relation Age of Onset     DIABETES Maternal Grandmother      C.A.D. Sister      HEART DISEASE Brother      DIABETES Mother      C.A.D. Father      HEART DISEASE Brother      HEART DISEASE Brother      HEART DISEASE Sister      Respiratory Sister      DIABETES Daughter      Anesthesia Reaction No family hx of      Asthma No family hx of      Hypertension No family hx of      Breast Cancer No family hx of      Alcohol/Drug No family hx of      Alzheimer Disease No family hx of      Prostate Cancer No family hx of      Cancer - colorectal No family hx of      CEREBROVASCULAR DISEASE No family hx of      Allergies No family hx of      Arthritis No family hx of      Cardiovascular No family hx of      Circulatory No family hx of      Depression No family hx of      Endocrine Disease No family hx of      Genetic Disorder No family hx of      GASTROINTESTINAL DISEASE No family hx of      Genitourinary Problems No family hx of      Blood Disease No family hx of      CANCER No family hx of      Congenital Anomalies No family hx of      Connective Tissue Disorder No family hx of      Eye Disorder No family hx of      Gynecology No family hx of      Obesity No family hx of      OSTEOPOROSIS No family hx of      Psychotic Disorder No family hx of      Musculoskeletal Disorder No family hx of      Lipids No family hx of      Neurologic Disorder No family hx of      Thyroid Disease No family hx of      Hearing Loss No family hx of         EXAM:Vitals: Temp 97.1  F (36.2  C) (Temporal)  Ht 4' 11.76\" (1.518 m)  Wt 165 lb (74.8 kg)  BMI 32.48 kg/m2  BMI= Body mass index is 32.48 " kg/(m^2).    General appearance: Patient is alert and fully cooperative with history & exam.  No sign of distress is noted during the visit.     Psychiatric: Affect is pleasant & appropriate.  Patient appears motivated to improve health.     Respiratory: Breathing is regular & unlabored while sitting.     HEENT: Hearing is intact to spoken word.  Speech is clear.  No gross evidence of visual impairment that would impact ambulation.     Vascular: DP & PT pulses  1/4, CFT delayed at 3 seconds, diminished hair growth and multiple varicosities and 1 cm pitting edema bilateral lower extremity. Temperature warm to cool proximal to distal.     Neurologic: Lower extremity sensation is intact to light touch.  Protective threshold intact bilateral. Normal plantar response equal intact bilateral.    Dermatologic: Slightly diminished texture turgor tone about the integument. All 10 toenails are thick and discolored elongated with subungual debris painful and lysing with debridement. A painful nucleated hyperkeratosis noted about the plantar central calcaneus on the left foot. A second lesion noted at the left hallux IPJ but the plantar medial aspect. This lesion is 2 cm across and more than 1 cm deep. Dry hematogenous exudate noted throughout the entire lesion however upon debridement to the dermis no full-thickness  ulceration is noted. There is maceration in the deep aspect over this area consistent with a grade 0 ulceration.      Musculoskeletal: Patient is ambulatory without assistive device or brace.  valgus deformity noted about the forefoot. Limited hallux IPJ bilateral left greater than right. There is bone prominence about the plantar medial hallux IPJ on the left foot therefore the hyperkeratosis.     ASSESSMENT:       ICD-10-CM    1. Hyperkeratosis L85.9    2. 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 Peripheral vascular angioplasty status Z98.62    4. Hammertoe of left foot M20.42         PLAN:  Reviewed patient's chart in epic.       8/22/2017   I debrided all 10 nails manually and mechanically.   I debrided the hyperkeratosis with a 15 blade scalpel to the level of dermis no dressing applied  Written instructions regarding appropriate shoe gear to accommodate the osteoarthritis deformity on the left hallux hammertoe.  Written instructions on how to manage this at home recommending abrasive debridement every bath or shower  All questions were answered and she is satisfied with this treatment retained.  Follow up as needed    Christiano Gonzalez DPM        Again, thank you for allowing me to participate in the care of your patient.        Sincerely,        Christiano Gonzalez DPM

## 2017-08-22 NOTE — PATIENT INSTRUCTIONS
"Nail Debridement    A high quality instrument makes trimming toenails MUCH easier.  Search ebPluggedIn for any 5\" nail nipper manufactured by reliable brands such as Miltex, Integra or Jarit as these quality instruments will help manage difficult nails more effectively and comfortably. A physician is not necessary to trim nails even if you are taking blood thinners or are diabetic.  Your family or care givers may help manage your toenails.      Trim or sand the nails once weekly.  Do not wait until they are long and painful or trimming will become too difficult and painful and will increase your risk of complications or infection.  A course file or 120 grit sandpaper on a block can be helpful.  For very thick nails many people prefer battery operated fenton such as an Amope', Personal Pedi and Emjoi for regular use or heavy painful callouses or thick toenails.    Trim or skive any portion of nail that is thick, loose, crumbling, or not well attached. Do not tear the nail away, but rather cut them with a nail nipper.  You may follow up with your Podiatric Physician if you have pain, bleeding, infection, questions or other concerns.      You may also contact the following Registered Nurses for further help with nail debridement and minor hygiene concnerns.  They will come to your home, trim your toenails and soak your feet, as well as monitor for any complications that would require evaluation by a Physician.      [employer] [facility]  Alayna Olivas RN, [facility]  [phone]  [email or web address]    [employer] [facility]  Emeli Betancourt RN  Office [phone]  [email or web address]    [employer] by Estrella Ramirez RN  [phone]  Call or text for appointment        Calluses, Corns, IPKs, Porokeratosis    When there is excessive friction or pressure on the skin, the body responds by making the skin thicker.  While this may protect the deeper structures, the thickened skin can take up more space and thus " increase pressure over a bony prominence or become an open sore or skin ulcer as this skin becomes less flexible.    Flat, diffuse thickening are simple calluses and they are usually caused by friction.  Often these are the result of rubbing on a shoe or going barefoot.    Calluses with a central core between the toes are called corns.  These often result from prominent joints on adjacent toes rubbing together.  Theses are often a symptom of bone malalignment and will usually recur unless the underlying bones are addressed.    Many of these lesions can be kept comfortable with routine maintenance. This consists of filing them with a Ped Egg, callus file, or 120 grit sandpaper on a block, every day during your bath or shower.  Most people prefer battery operated fenton such as an Amope', Personal Pedi and Emjoi for regular use or heavy painful callouses.  Heavy creams or ointments can be applied 1-2 times every day to keep them soft. Toe spacers can be used for corns, gel pads can be used for other lesions on the bottom of the foot. If there is a deformity noted, such as a prominent bone, often this can be addressed to minimize recurrence. However, sometimes the pressure and lesion simply migrates to another spot after surgery, so it is not a guaranteed cure.     If you have severe callouses and cracking, you may apply heavy greasy ointments that you scoop up such as Cetaphil, Eucerin, Aquaphor or Vaseline.  For more aggressive help apply heavy creams or ointment under occlusive dressings such as Saran Wrap or Jelly Feet while sleeping.   Jelly Feet can be obtained at [email or web address].     To be successful with managing hyperkeratotic skin, you must manage hygiene daily.  At your bath or shower time is the easiest time to work on this when skin is most soft.  There is no medical or surgical treatment that will absolutely eliminate many of these symptoms.      [employer] is a reliable source for all sorts of foot pads,  cushions, or interdigital spacers and foot appliances. Go to [email or web address] or request a catalog at 3-119-All4Staff.        Please call with any additional questions.               Reliable shoe stores: To maximize your experience and provide the best possible fit.  Be sure to show them your foot concerns and tell them Dr. Gonzalez sent you.      Stores listed in bold have only athletic shoes, and stores that are not bold are mostly casual or variety of shoes    [employer]  [address] [place] [address]  [phone]     [employer] [facility]  [address] [place] [address]  [phone]    [facility] [employer] [facility]  [address] [place] [address]  [phone]    [employer]  [address] [facility] [address]  [phone]    [employer]  [address] [place], [place] [address] [phone]    [employer]  [address] [place] [address]  [phone]                         [employer] Locations:     [address] [place] [address]   [phone]     [address] [place] [address] [address] [place] [address]   [phone]     [address] [place] [address]   [phone]     [address] [place] [address]   [phone]     [address] [place] [address]   [phone]     [address] [facility] [place] [address]   [phone]     [address] [facility]   [place] [address]   [phone]     [address]   [place], [place] [address] [phone]             How to find reasonable shoes          The correct width    Correct Fitting    Correct Length      Foot Distortion    Posture Distortion                          Torsional Rigidity      Grasp behind the heel and underneath the foot and twist      Bad    Excessive torsion/twist in midfoot     Less torsion/twist in midfoot is better                   Heel Counter Rigidity      Grasp just above   midsole and squeeze      Bad    Soft  heel counter      Good    Rigid Heel Counter      Flexion Rigidity      Grasp shoe and bend from forefoot to rearfoot

## 2017-08-22 NOTE — PROGRESS NOTES
HPI:  Nina Villalpando is a 86 year old female who is seen in consultation at the request of Promise Barroso CNP.    Pt presents for eval of:   (Onset, Location, L/R, Character, Treatments, Injury if yes)     Ongoing for past few years, increasing in size over past few months. Callus plantar Left great toe and heel. Dark discoloration noted throughout and it is nearly an inch baby. Also hammer toes  Thick, discolored toenails  Intermittent sharp, dull ache, pain 5 w/increase in activity    Retired.    Weight management plan: Patient was referred to their PCP to discuss a diet and exercise plan.    ROS:  10 point ROS neg other than the symptoms noted above in the HPI.    PAST MEDICAL HISTORY:   Past Medical History:   Diagnosis Date     Carpal tunnel syndrome     bilateral     Diverticulitis, colonic     years ago     Generalized osteoarthrosis, unspecified site      Hernia of unspecified site of abdominal cavity without mention of obstruction or gangrene     hiatal hernia, umbilical hernia     Other and unspecified hyperlipidemia      Unspecified essential hypertension      Unspecified sinusitis (chronic)     sinus problems        PAST SURGICAL HISTORY:   Past Surgical History:   Procedure Laterality Date     C NONSPECIFIC PROCEDURE  1980    Hysterectomy due to menometrorrhagia.  Bladder repair.     CATARACT IOL, RT/LT  6/26/2008    left eye     CATARACT IOL, RT/LT  7/24/2008    right eye     COLONOSCOPY  04/16/2007     HERNIA REPAIR, UMBILICAL  09/27/2007    Incarcerated.     LASER YAG CAPSULOTOMY  5/22/2014    Procedure: LASER YAG CAPSULOTOMY;  Surgeon: Hebert Ledbetter MD;  Location:  OR        MEDICATIONS:   Current Outpatient Prescriptions:      metoprolol (TOPROL-XL) 100 MG 24 hr tablet, TAKE ONE & ONE-HALF TABLETS BY MOUTH ONCE DAILY AT BEDTIME, Disp: 135 tablet, Rfl: 3     hydrALAZINE (APRESOLINE) 50 MG tablet, Take 1 tablet (50 mg) by mouth 3 times daily, Disp: 270 tablet, Rfl: 1     cloNIDine  (CATAPRES) 0.2 MG tablet, Take 1 tablet (0.2 mg) by mouth 2 times daily, Disp: 180 tablet, Rfl: 3     atorvastatin (LIPITOR) 20 MG tablet, Take 1 tablet (20 mg) by mouth daily, Disp: 90 tablet, Rfl: 3     order for DME, Equipment being ordered: Blood pressure monitor, Disp: 1 Device, Rfl: 0     biotin 1000 MCG TABS tablet, Take 1 tablet by mouth daily., Disp: , Rfl:      COMPRESSION STOCKINGS, Lower extremities compression stockings Below the knees Mild pressure 15-20 MMHG    , Disp: 2 each, Rfl: 0     aspirin 81 MG tablet, Take 1 tablet by mouth daily., Disp: , Rfl:      Cholecalciferol (VITAMIN D) 1000 UNIT capsule, Take 1 capsule by mouth daily Not taking every day, Disp: , Rfl:      GLUCOSAMINE-CHONDROITIN OR TABS, 1 tablet qd, Disp: 0, Rfl: 0     VITAMIN E 400 UNIT OR CAPS, 1 qd , Disp: 0, Rfl: 0     MULTI-DAY OR TABS, , Disp: , Rfl: 0     ALLERGIES:    Allergies   Allergen Reactions     No Known Drug Allergies         SOCIAL HISTORY:   Social History     Social History     Marital status:      Spouse name: N/A     Number of children: 2     Years of education: N/A     Occupational History     retired Retired     Social History Main Topics     Smoking status: Never Smoker     Smokeless tobacco: Never Used      Comment: no smokers in household     Alcohol use Yes      Comment: marcelo/waterx1-2/x1-2 q couple of months     Drug use: No     Sexual activity: Yes     Partners: Male     Birth control/ protection: Surgical      Comment: partial hyst/bilateral ovaries remained     Other Topics Concern      Service No     Blood Transfusions No     Caffeine Concern Yes     4-5 cups coffee daily     Occupational Exposure No     Hobby Hazards No     Sleep Concern No     Stress Concern No     Weight Concern Yes     would like nohemy ose weight     Special Diet No     Back Care No     Exercise Yes     Bike Helmet No     doesn' t bike     Seat Belt Yes     Self-Exams Yes     Parent/Sibling W/ Cabg, Mi Or Angioplasty  "Before 65f 55m? No     Social History Narrative        FAMILY HISTORY:   Family History   Problem Relation Age of Onset     DIABETES Maternal Grandmother      C.A.D. Sister      HEART DISEASE Brother      DIABETES Mother      C.A.D. Father      HEART DISEASE Brother      HEART DISEASE Brother      HEART DISEASE Sister      Respiratory Sister      DIABETES Daughter      Anesthesia Reaction No family hx of      Asthma No family hx of      Hypertension No family hx of      Breast Cancer No family hx of      Alcohol/Drug No family hx of      Alzheimer Disease No family hx of      Prostate Cancer No family hx of      Cancer - colorectal No family hx of      CEREBROVASCULAR DISEASE No family hx of      Allergies No family hx of      Arthritis No family hx of      Cardiovascular No family hx of      Circulatory No family hx of      Depression No family hx of      Endocrine Disease No family hx of      Genetic Disorder No family hx of      GASTROINTESTINAL DISEASE No family hx of      Genitourinary Problems No family hx of      Blood Disease No family hx of      CANCER No family hx of      Congenital Anomalies No family hx of      Connective Tissue Disorder No family hx of      Eye Disorder No family hx of      Gynecology No family hx of      Obesity No family hx of      OSTEOPOROSIS No family hx of      Psychotic Disorder No family hx of      Musculoskeletal Disorder No family hx of      Lipids No family hx of      Neurologic Disorder No family hx of      Thyroid Disease No family hx of      Hearing Loss No family hx of         EXAM:Vitals: Temp 97.1  F (36.2  C) (Temporal)  Ht 4' 11.76\" (1.518 m)  Wt 165 lb (74.8 kg)  BMI 32.48 kg/m2  BMI= Body mass index is 32.48 kg/(m^2).    General appearance: Patient is alert and fully cooperative with history & exam.  No sign of distress is noted during the visit.     Psychiatric: Affect is pleasant & appropriate.  Patient appears motivated to improve health.     Respiratory: " Breathing is regular & unlabored while sitting.     HEENT: Hearing is intact to spoken word.  Speech is clear.  No gross evidence of visual impairment that would impact ambulation.     Vascular: DP & PT pulses  1/4, CFT delayed at 3 seconds, diminished hair growth and multiple varicosities and 1 cm pitting edema bilateral lower extremity. Temperature warm to cool proximal to distal.     Neurologic: Lower extremity sensation is intact to light touch.  Protective threshold intact bilateral. Normal plantar response equal intact bilateral.    Dermatologic: Slightly diminished texture turgor tone about the integument. All 10 toenails are thick and discolored elongated with subungual debris painful and lysing with debridement. A painful nucleated hyperkeratosis noted about the plantar central calcaneus on the left foot. A second lesion noted at the left hallux IPJ but the plantar medial aspect. This lesion is 2 cm across and more than 1 cm deep. Dry hematogenous exudate noted throughout the entire lesion however upon debridement to the dermis no full-thickness  ulceration is noted. There is maceration in the deep aspect over this area consistent with a grade 0 ulceration.      Musculoskeletal: Patient is ambulatory without assistive device or brace.  valgus deformity noted about the forefoot. Limited hallux IPJ bilateral left greater than right. There is bone prominence about the plantar medial hallux IPJ on the left foot therefore the hyperkeratosis.     ASSESSMENT:       ICD-10-CM    1. Hyperkeratosis L85.9    2. 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 Peripheral vascular angioplasty status Z98.62    4. Hammertoe of left foot M20.42         PLAN:  Reviewed patient's [facility] in [facility].      8/22/2017   I debrided all 10 nails manually and mechanically.   I debrided the hyperkeratosis with a 15 blade scalpel to the level of dermis no dressing applied  Written instructions regarding appropriate shoe gear to accommodate the osteoarthritis  deformity on the left hallux hammertoe.  Written instructions on how to manage this at home recommending abrasive debridement every bath or shower  All questions were answered and she is satisfied with this treatment retained.  Follow up as needed    Christiano Gonzalez DPM

## 2017-09-26 ENCOUNTER — TRANSFERRED RECORDS (OUTPATIENT)
Dept: HEALTH INFORMATION MANAGEMENT | Facility: CLINIC | Age: 82
End: 2017-09-26

## 2018-01-25 ENCOUNTER — ALLIED HEALTH/NURSE VISIT (OUTPATIENT)
Dept: FAMILY MEDICINE | Facility: OTHER | Age: 83
End: 2018-01-25
Payer: COMMERCIAL

## 2018-01-25 DIAGNOSIS — Z23 NEED FOR PROPHYLACTIC VACCINATION AND INOCULATION AGAINST INFLUENZA: Primary | ICD-10-CM

## 2018-01-25 PROCEDURE — 99207 ZZC NO CHARGE NURSE ONLY: CPT

## 2018-01-25 PROCEDURE — G0008 ADMIN INFLUENZA VIRUS VAC: HCPCS

## 2018-01-25 PROCEDURE — 90662 IIV NO PRSV INCREASED AG IM: CPT

## 2018-01-25 NOTE — PROGRESS NOTES

## 2018-01-25 NOTE — MR AVS SNAPSHOT
"              After Visit Summary   1/25/2018    Nina Villalpando    MRN: [id number]           Patient Information     Date Of Birth          9/21/1930        Visit Information        Provider Department      1/25/2018 3:30 PM NL FL[facility]        Today's Diagnoses     Need for prophylactic vaccination and inoculation against influenza    -  1       Follow-ups after your visit        Your next 10 appointments already scheduled     Jan 25, 2018  3:30 PM CST   Nurse Only with NL [facility]U [facility]   [facility]    [address]  [facility] [address] [phone]              Who to contact     If you have questions or need follow up information about today's clinic visit or your schedule please contact [facility] directly at [phone].  Normal or non-critical lab and imaging results will be communicated to you by [employer], letter or phone within 4 business days after the clinic has received the results. If you do not hear from us within 7 days, please contact the clinic through [employer] or phone. If you have a critical or abnormal lab result, we will notify you by phone as soon as possible.  Submit refill requests through [employer] or call your pharmacy and they will forward the refill request to us. Please allow 3 business days for your refill to be completed.          Additional Information About Your Visit        MyChart Information     [employer] lets you send messages to your doctor, view your test results, renew your prescriptions, schedule appointments and more. To sign up, go to [email or web address] . Click on \"Log in\" on the left side of the screen, which will take you to the Welcome page. Then click on \"Sign up Now\" on the right side of the page.     You will be asked to enter the access code listed below, as well as some personal information. Please follow the directions to create your username and " password.     Your access code is: [email or web address]  Expires: 2018 11:51 AM     Your access code will  in 90 days. If you need help or a new code, please call your [facility] clinic or [phone].        Care EveryWhere ID     This is your Care EveryWhere ID. This could be used by other organizations to access your [facility] medical records  [id number]         Blood Pressure from Last 3 Encounters:   17 124/78   17 140/72   17 180/72    Weight from Last 3 Encounters:   17 165 lb (74.8 kg)   17 165 lb (74.8 kg)   17 172 lb (78 kg)              We Performed the Following     FLU VACCINE, INCREASED ANTIGEN, PRESV FREE, AGE 65+ [77148]     Vaccine Administration, Initial [[id number]]        Primary Care Provider Office Phone # Fax #    Patricia TEE MD [place] [phone]       [address] [place] [address]        Equal Access to Services     [facility]: Hadii aad ku hadasho Soomaali, waaxda luqadaha, qaybta kaalmada adeegyadani, tim angeles . So [facility] [phone].    ATENCIÓN: Si habla español, tiene a reaves disposición servicios gratuitos de asistencia lingüística. Llame al [phone].    We comply with applicable federal civil rights laws and [place] laws. We do not discriminate on the basis of race, color, national origin, age, disability, sex, sexual orientation, or gender identity.            Thank you!     Thank you for choosing [facility]  for your care. Our goal is always to provide you with excellent care. Hearing back from our patients is one way we can continue to improve our services. Please take a few minutes to complete the written survey that you may receive in the mail after your visit with us. Thank you!             Your Updated Medication List - Protect others around you: Learn how to safely use, store and throw away your medicines at www.disposemymeds.org.          This list is accurate as  of 1/25/18 11:51 AM.  Always use your most recent med list.                   Brand Name Dispense Instructions for use Diagnosis    aspirin 81 MG tablet      Take 1 tablet by mouth daily.        atorvastatin 20 MG tablet    LIPITOR    90 tablet    Take 1 tablet (20 mg) by mouth daily    Hyperlipidemia, unspecified       biotin 1000 MCG Tabs tablet      Take 1 tablet by mouth daily.        cloNIDine 0.2 MG tablet    CATAPRES    180 tablet    Take 1 tablet (0.2 mg) by mouth 2 times daily    HTN, goal below 150/90       COMPRESSION STOCKINGS     2 each    Lower extremities compression stockings Below the knees Mild pressure 15-20 MMHG    Varicose veins of legs       Glucosamine-Chondroitin Tabs     0    1 tablet qd        hydrALAZINE 50 MG tablet    APRESOLINE    270 tablet    Take 1 tablet (50 mg) by mouth 3 times daily    HTN, goal below 150/90       metoprolol succinate 100 MG 24 hr tablet    TOPROL-XL    135 tablet    TAKE ONE & ONE-HALF TABLETS BY MOUTH ONCE DAILY AT BEDTIME    HTN, goal below 150/90       MULTI-DAY Tabs           order for DME     1 Device    Equipment being ordered: Blood pressure monitor    HTN, goal below 150/90       vitamin D 1000 UNITS capsule      Take 1 capsule by mouth daily Not taking every day        vitamin E 400 UNIT capsule     0    1 qd

## 2018-05-01 ENCOUNTER — TELEPHONE (OUTPATIENT)
Dept: FAMILY MEDICINE | Facility: OTHER | Age: 83
End: 2018-05-01

## 2018-05-01 NOTE — TELEPHONE ENCOUNTER
"Nina Villalpando is a 87 year old female who calls with diarrhea.    NURSING ASSESSMENT:  Description:  I spoke with pt who states she has diarrhea since Saturday. Pt states she also has hemorrhoids and at first the stools were bloody but there is no blood now. No diarrhea yesterday or Sunday. 0300 this morning she woke up again with watery stools. Has gone twice. States her stomach \"burns\" and some cramping while going to bathroom. No fevers. No signs of dehydration.   Onset/duration:  Saturday  Precip. factors:  Hemorrhoids  Associated symptoms:  Ate spaghetti last night and drank coffee this morning, discussed bland diet and drinks.   Improves/worsens symptoms:  Pepto-Bismol is helping.   Pain scale (0-10)   0/10    Allergies:   Allergies   Allergen Reactions     No Known Drug Allergies        RECOMMENDED DISPOSITION:  Home care advice  Will comply with recommendation: Yes  If further questions/concerns or if symptoms do not improve, worsen or new symptoms develop, call your PCP or [facility] as soon as possible.      Guideline used: Diarrhea  Telephone Triage Protocols for Nurses, Fifth Edition, Shavon Rios RN    "

## 2018-05-01 NOTE — TELEPHONE ENCOUNTER
[facility] phone call message- patient reporting a symptom:    Symptom or request: diarrhea    Duration (how long have symptoms been present): since Sunday  Have you been treated for this before? No    Additional comments: patient did not want to make a appt    Call taken on 5/1/2018 at 10:38 AM by Julia Chun

## 2018-05-03 ENCOUNTER — OFFICE VISIT (OUTPATIENT)
Dept: FAMILY MEDICINE | Facility: OTHER | Age: 83
End: 2018-05-03
Payer: COMMERCIAL

## 2018-05-03 ENCOUNTER — TRANSFERRED RECORDS (OUTPATIENT)
Dept: HEALTH INFORMATION MANAGEMENT | Facility: CLINIC | Age: 83
End: 2018-05-03

## 2018-05-03 ENCOUNTER — HOSPITAL ENCOUNTER (EMERGENCY)
Facility: CLINIC | Age: 83
Discharge: ANOTHER HOSPITAL | End: 2018-05-03
Attending: FAMILY MEDICINE | Admitting: FAMILY MEDICINE
Payer: COMMERCIAL

## 2018-05-03 VITALS
OXYGEN SATURATION: 98 % | TEMPERATURE: 97.5 F | WEIGHT: 160 LBS | BODY MASS INDEX: 32.25 KG/M2 | HEART RATE: 71 BPM | RESPIRATION RATE: 18 BRPM | HEIGHT: 59 IN | DIASTOLIC BLOOD PRESSURE: 74 MMHG | SYSTOLIC BLOOD PRESSURE: 161 MMHG

## 2018-05-03 VITALS
BODY MASS INDEX: 31.41 KG/M2 | WEIGHT: 160 LBS | HEART RATE: 73 BPM | DIASTOLIC BLOOD PRESSURE: 82 MMHG | HEIGHT: 60 IN | RESPIRATION RATE: 16 BRPM | TEMPERATURE: 97.6 F | SYSTOLIC BLOOD PRESSURE: 128 MMHG | OXYGEN SATURATION: 100 %

## 2018-05-03 DIAGNOSIS — D62 ANEMIA DUE TO BLOOD LOSS, ACUTE: ICD-10-CM

## 2018-05-03 DIAGNOSIS — K92.1 MELENA: Primary | ICD-10-CM

## 2018-05-03 DIAGNOSIS — K92.2 UPPER GI BLEED: ICD-10-CM

## 2018-05-03 LAB
ALBUMIN SERPL-MCNC: 3.1 G/DL (ref 3.4–5)
ALBUMIN UR-MCNC: NEGATIVE MG/DL
ALP SERPL-CCNC: 84 U/L (ref 40–150)
ALT SERPL W P-5'-P-CCNC: 12 U/L (ref 0–50)
ANION GAP SERPL CALCULATED.3IONS-SCNC: 10 MMOL/L (ref 3–14)
APPEARANCE UR: CLEAR
AST SERPL W P-5'-P-CCNC: 30 U/L (ref 0–45)
BACTERIA #/AREA URNS HPF: ABNORMAL /HPF
BASOPHILS # BLD AUTO: 0.1 10E9/L (ref 0–0.2)
BASOPHILS NFR BLD AUTO: 0.8 %
BILIRUB SERPL-MCNC: 0.3 MG/DL (ref 0.2–1.3)
BILIRUB UR QL STRIP: NEGATIVE
BUN SERPL-MCNC: 14 MG/DL (ref 7–30)
CALCIUM SERPL-MCNC: 8.6 MG/DL (ref 8.5–10.1)
CHLORIDE SERPL-SCNC: 101 MMOL/L (ref 94–109)
CHOLEST SERPL-MCNC: 137 MG/DL
CO2 SERPL-SCNC: 23 MMOL/L (ref 20–32)
COLOR UR AUTO: YELLOW
CREAT SERPL-MCNC: 0.95 MG/DL (ref 0.52–1.04)
DIFFERENTIAL METHOD BLD: ABNORMAL
EOSINOPHIL # BLD AUTO: 0 10E9/L (ref 0–0.7)
EOSINOPHIL NFR BLD AUTO: 0.4 %
ERYTHROCYTE [DISTWIDTH] IN BLOOD BY AUTOMATED COUNT: 13.1 % (ref 10–15)
GFR SERPL CREATININE-BSD FRML MDRD: 55 ML/MIN/1.7M2
GLUCOSE SERPL-MCNC: 105 MG/DL (ref 70–99)
GLUCOSE UR STRIP-MCNC: NEGATIVE MG/DL
HCT VFR BLD AUTO: 21.7 % (ref 35–47)
HDLC SERPL-MCNC: 52 MG/DL
HEMOCCULT STL QL: POSITIVE
HGB BLD-MCNC: 7.2 G/DL (ref 11.7–15.7)
HGB UR QL STRIP: NEGATIVE
KETONES UR STRIP-MCNC: NEGATIVE MG/DL
LDLC SERPL CALC-MCNC: 70 MG/DL
LEUKOCYTE ESTERASE UR QL STRIP: ABNORMAL
LYMPHOCYTES # BLD AUTO: 2.1 10E9/L (ref 0.8–5.3)
LYMPHOCYTES NFR BLD AUTO: 23.2 %
MCH RBC QN AUTO: 32 PG (ref 26.5–33)
MCHC RBC AUTO-ENTMCNC: 33.2 G/DL (ref 31.5–36.5)
MCV RBC AUTO: 96 FL (ref 78–100)
MONOCYTES # BLD AUTO: 0.8 10E9/L (ref 0–1.3)
MONOCYTES NFR BLD AUTO: 8.4 %
NEUTROPHILS # BLD AUTO: 6 10E9/L (ref 1.6–8.3)
NEUTROPHILS NFR BLD AUTO: 67.2 %
NITRATE UR QL: NEGATIVE
NON-SQ EPI CELLS #/AREA URNS LPF: ABNORMAL /LPF
NONHDLC SERPL-MCNC: 85 MG/DL
PH UR STRIP: 6 PH (ref 5–7)
PLATELET # BLD AUTO: 317 10E9/L (ref 150–450)
POTASSIUM SERPL-SCNC: 4.1 MMOL/L (ref 3.4–5.3)
PROT SERPL-MCNC: 6.7 G/DL (ref 6.8–8.8)
RBC # BLD AUTO: 2.25 10E12/L (ref 3.8–5.2)
RBC #/AREA URNS AUTO: ABNORMAL /HPF
SODIUM SERPL-SCNC: 134 MMOL/L (ref 133–144)
SOURCE: ABNORMAL
SP GR UR STRIP: 1.01 (ref 1–1.03)
TRIGL SERPL-MCNC: 77 MG/DL
TSH SERPL DL<=0.005 MIU/L-ACNC: 2.17 MU/L (ref 0.4–4)
UROBILINOGEN UR STRIP-ACNC: 0.2 EU/DL (ref 0.2–1)
WBC # BLD AUTO: 8.9 10E9/L (ref 4–11)
WBC #/AREA URNS AUTO: ABNORMAL /HPF

## 2018-05-03 PROCEDURE — 36415 COLL VENOUS BLD VENIPUNCTURE: CPT | Performed by: FAMILY MEDICINE

## 2018-05-03 PROCEDURE — 82272 OCCULT BLD FECES 1-3 TESTS: CPT | Performed by: FAMILY MEDICINE

## 2018-05-03 PROCEDURE — 84443 ASSAY THYROID STIM HORMONE: CPT | Performed by: FAMILY MEDICINE

## 2018-05-03 PROCEDURE — 80061 LIPID PANEL: CPT | Performed by: FAMILY MEDICINE

## 2018-05-03 PROCEDURE — 81001 URINALYSIS AUTO W/SCOPE: CPT | Performed by: FAMILY MEDICINE

## 2018-05-03 PROCEDURE — 99285 EMERGENCY DEPT VISIT HI MDM: CPT | Performed by: FAMILY MEDICINE

## 2018-05-03 PROCEDURE — 99215 OFFICE O/P EST HI 40 MIN: CPT | Performed by: FAMILY MEDICINE

## 2018-05-03 PROCEDURE — 99284 EMERGENCY DEPT VISIT MOD MDM: CPT | Mod: Z6 | Performed by: FAMILY MEDICINE

## 2018-05-03 PROCEDURE — 80053 COMPREHEN METABOLIC PANEL: CPT | Performed by: FAMILY MEDICINE

## 2018-05-03 PROCEDURE — 85025 COMPLETE CBC W/AUTO DIFF WBC: CPT | Performed by: FAMILY MEDICINE

## 2018-05-03 ASSESSMENT — ENCOUNTER SYMPTOMS
VOMITING: 0
WEAKNESS: 1
NAUSEA: 0
ABDOMINAL PAIN: 1
LIGHT-HEADEDNESS: 0
DIARRHEA: 1

## 2018-05-03 ASSESSMENT — PAIN SCALES - GENERAL: PAINLEVEL: MILD PAIN (3)

## 2018-05-03 NOTE — PROGRESS NOTES
SUBJECTIVE:   Nina Villalpando is a 87 year old female who presents to clinic today for the following health issues:      HPI     Diarrhea  Onset: x 1 week    Description:   Consistency of stool: watery, runny and loose  Blood in stool: YES  Number of loose stools in past 24 hours:     Progression of Symptoms:  improving    Accompanying Signs & Symptoms:  Fever: no   Nausea or vomiting; no   Abdominal pain: no   Episodes of constipation: YES- since taking imodium  Weight loss: no     History:   Ill contacts: no   Recent use of antibiotics: no    Recent travels: no          Recent medication-new or changes(Rx or OTC): no   Therapies Tried and outcome:  Imodium AD and PeptoBismol    Joint Pain    Onset: Few Months    Description:   Location: right shoulder- Muscles are sore  Character:     Intensity: moderate    Progression of Symptoms: same    Accompanying Signs & Symptoms:  Other symptoms: none    History:   Previous similar pain: YES      Precipitating factors:   Trauma or overuse: no     Alleviating factors:  Improved by: rest/inactivity  Therapies Tried and outcome: None        Problem list and histories reviewed & adjusted, as indicated.  Additional history: as documented      Patient Active Problem List   Diagnosis     Primary osteoarthritis of both knees     Advanced directives, counseling/discussion     Varicose veins of legs     Essential hypertension     Chronic rhinitis     CHAU (renal artery stenosis) (H)     Hair loss     Health Care Home     Aortic stenosis     Hyperlipidemia, unspecified     Past Surgical History:   Procedure Laterality Date     C NONSPECIFIC PROCEDURE  1980    Hysterectomy due to menometrorrhagia.  Bladder repair.     CATARACT IOL, RT/LT  6/26/2008    left eye     CATARACT IOL, RT/LT  7/24/2008    right eye     COLONOSCOPY  04/16/2007     HERNIA REPAIR, UMBILICAL  09/27/2007    Incarcerated.     LASER YAG CAPSULOTOMY  5/22/2014    Procedure: LASER YAG CAPSULOTOMY;  Surgeon:  Hebert Ledbetter MD;  Location: [facility]       Social History   Substance Use Topics     Smoking status: Never Smoker     Smokeless tobacco: Never Used      Comment: no smokers in household     Alcohol use Yes      Comment: marcelo/waterx1-2/x1-2 q couple of months     Family History   Problem Relation Age of Onset     DIABETES Maternal Grandmother      C.A.D. Sister      HEART DISEASE Brother      DIABETES Mother      C.A.D. Father      HEART DISEASE Brother      HEART DISEASE Brother      HEART DISEASE Sister      Respiratory Sister      DIABETES Daughter      Anesthesia Reaction No family hx of      Asthma No family hx of      Hypertension No family hx of      Breast Cancer No family hx of      Alcohol/Drug No family hx of      Alzheimer Disease No family hx of      Prostate Cancer No family hx of      Cancer - colorectal No family hx of      CEREBROVASCULAR DISEASE No family hx of      Allergies No family hx of      Arthritis No family hx of      Cardiovascular No family hx of      Circulatory No family hx of      Depression No family hx of      Endocrine Disease No family hx of      Genetic Disorder No family hx of      GASTROINTESTINAL DISEASE No family hx of      Genitourinary Problems No family hx of      Blood Disease No family hx of      CANCER No family hx of      Congenital Anomalies No family hx of      Connective Tissue Disorder No family hx of      Eye Disorder No family hx of      Gynecology No family hx of      Obesity No family hx of      OSTEOPOROSIS No family hx of      Psychotic Disorder No family hx of      Musculoskeletal Disorder No family hx of      Lipids No family hx of      Neurologic Disorder No family hx of      Thyroid Disease No family hx of      Hearing Loss No family hx of          Current Outpatient Prescriptions   Medication Sig Dispense Refill     aspirin 81 MG tablet Take 1 tablet by mouth daily.       biotin 1000 MCG TABS tablet Take 1 tablet by mouth daily.        Cholecalciferol (VITAMIN D) 1000 UNIT capsule Take 1 capsule by mouth daily Not taking every day       cloNIDine (CATAPRES) 0.2 MG tablet Take 1 tablet (0.2 mg) by mouth 2 times daily 180 tablet 3     hydrALAZINE (APRESOLINE) 50 MG tablet Take 1 tablet (50 mg) by mouth 3 times daily 270 tablet 1     metoprolol (TOPROL-XL) 100 MG 24 hr tablet TAKE ONE & ONE-HALF TABLETS BY MOUTH ONCE DAILY AT BEDTIME 135 tablet 3     MULTI-DAY OR TABS   0     VITAMIN E 400 UNIT OR CAPS 1 qd  0 0     atorvastatin (LIPITOR) 20 MG tablet Take 1 tablet (20 mg) by mouth daily 90 tablet 3     Blood Pressure Monitor KIT 1 Device daily Use to check your blood pressure every day. (Wrist monitor) 1 kit 0     COMPRESSION STOCKINGS Lower extremities compression stockings  Below the knees  Mild pressure 15-20 MMHG         2 each 0     Ferrous Gluconate 324 (37.5 Fe) MG TABS Take 324 mg by mouth daily       GLUCOSAMINE-CHONDROITIN OR TABS 1 tablet qd 0 0     order for DME Equipment being ordered: Blood pressure monitor 1 Device 0     Allergies   Allergen Reactions     No Known Drug Allergies      Recent Labs   Lab Test  05/03/18   1235  08/09/17   1451  03/21/17   1412  08/26/16   1018   05/21/15   1125   05/22/13   1212   12/07/11   1014   A1C   --    --    --    --    --    --    --   5.9   --   5.9   LDL  70   --    --   47   --   48   < >   --    --    --    HDL  52   --    --   57   --   64   < >   --    --    --    TRIG  77   --    --   91   --   79   < >   --    --    --    ALT  12  14  16  14   --   10   < >   --    --    --    CR  0.95  0.95  0.82  0.97   < >  1.00   < >  0.88   < >   --    GFRESTIMATED  55*  55*  66  54*   < >  53*   < >  62   < >   --    GFRESTBLACK  67  67  80  66   < >  64   < >  74   < >   --    POTASSIUM  4.1  4.0  4.0  4.5   < >  4.0   < >  4.2   < >   --    TSH  2.17   --   3.22   --    --    --    < >   --    < >   --     < > = values in this interval not displayed.      BP Readings from Last 3 Encounters:  "  05/11/18 138/70   05/03/18 161/74   05/03/18 128/82    Wt Readings from Last 3 Encounters:   05/11/18 157 lb (71.2 kg)   05/03/18 160 lb (72.6 kg)   05/03/18 160 lb (72.6 kg)                  Labs reviewed in EPIC    ROS:  Constitutional, HEENT, cardiovascular, pulmonary, GI, , musculoskeletal, neuro, skin, endocrine and psych systems are negative, except as otherwise noted.    OBJECTIVE:     /82 (BP Location: Right arm, Patient Position: Chair, Cuff Size: Adult Regular)  Pulse 73  Temp 97.6  F (36.4  C) (Temporal)  Resp 16  Ht 4' 11.7\" (1.516 m)  Wt 160 lb (72.6 kg)  SpO2 100%  BMI 31.56 kg/m2  Body mass index is 31.56 kg/(m^2).   Physical Exam   Constitutional: She is oriented to person, place, and time. She appears well-developed.   Frail elderly lady. No acute distress   HENT:   Head: Normocephalic and atraumatic.   Right Ear: External ear normal.   Left Ear: External ear normal.   Nose: Nose normal.   Cardiovascular: Normal rate, regular rhythm and normal heart sounds.  Exam reveals no gallop.    No murmur heard.  Pulmonary/Chest: Effort normal and breath sounds normal.   Abdominal: Soft. Bowel sounds are normal. She exhibits no distension and no mass. There is no tenderness. There is no rebound and no guarding.   Genitourinary:   Genitourinary Comments: Per rectal exam showed dark colored stool   Musculoskeletal: Normal range of motion.   Neurological: She is alert and oriented to person, place, and time.   Psychiatric: She has a normal mood and affect. Her behavior is normal. Judgment and thought content normal.         Diagnostic Test Results:  none     ASSESSMENT/PLAN:     Problem List Items Addressed This Visit     None      Visit Diagnoses     Melena    -  Primary    Relevant Orders    Lipid panel reflex to direct LDL Fasting (Completed)    CBC with platelets and differential (Completed)    Comprehensive metabolic panel (BMP + Alb, Alk Phos, ALT, AST, Total. Bili, TP) (Completed)    TSH " with free T4 reflex (Completed)    *UA reflex to Microscopic (Completed)    Occult blood stool (Completed)    Urine Microscopic (Completed)         Pt appeared very pale on exam, per rectal exam revealed melena . Stat CBC showed a drop in Hb by 5 points from 12 to 7 compared to last year. With concerns for acute GI bleed she was instructed to go the emergency for emergency EGD/Colonoscopy.  I did call the emergency department at [facility] and talked to a provider there about her transfer. She would go there through personal transportation. Daughter to drive her  Kyra Dangelo MD  [facility]

## 2018-05-03 NOTE — ED PROVIDER NOTES
History     Chief Complaint   Patient presents with     Rectal Bleeding     Diarrhea     The history is provided by the patient.     Nina Villalpando is a 87 year old female who presents to the emergency department from the [facility] after seeing Kyra Dangelo for melenic stools. There is was discovered that she had a hemoglobin of 7.2 and her stool was positive for blood.  They called the patient told her to go to the emergency department.  Patient has had loose melenic stools for the past 1.5 weeks. She states that every time she eats she has melenic stools. She has also been experiencing diarrhea, when it first started she did take some Pepto Bismol. Patient did not have any bowel movements yesterday and has had 1 melenic stool today. She notes that she had a burning sensation in her abdomen when she was experiencing diarrhea.  She complained of some weakness of her legs when trying to move around today. No lightheadedness, nausea, and vomiting.    Problem List:    Patient Active Problem List    Diagnosis Date Noted     Hyperlipidemia, unspecified 01/22/2014     Priority: Medium     Diagnosis updated by automated process. Provider to review and confirm.       Aortic stenosis 11/01/2013     Priority: Medium     Health Care Home 12/28/2012     Priority: Medium     Oriana Yi RN-PHN  [facility]   [phone]    DX V65.8 REPLACED WITH [facility]ALTH CARE [facility] (04/08/2013)       CHAU (renal artery stenosis) (H) 09/13/2012     Priority: Medium     Hair loss 09/13/2012     Priority: Medium     Chronic rhinitis 04/17/2012     Priority: Medium     Essential hypertension 01/04/2012     Priority: Medium     Varicose veins of legs 11/29/2011     Priority: Medium     Advanced directives, counseling/discussion 10/27/2011     Priority: Medium     Advance Directive Problem List Overview:   Name Relationship Phone    Primary Health Care Agent            Alternative Health Care Agent           Discussed advance care planning with patient; however, patient declined at this time. 10/27/2011      3/21/17--Literature given to patient       Primary osteoarthritis of both knees      Priority: Medium        Past Medical History:    Past Medical History:   Diagnosis Date     Carpal tunnel syndrome      Diverticulitis of colon (without mention of hemorrhage)(562.11)      Generalized osteoarthrosis, unspecified site      Hernia of unspecified site of abdominal cavity without mention of obstruction or gangrene      Other and unspecified hyperlipidemia      Unspecified essential hypertension      Unspecified sinusitis (chronic)        Past Surgical History:    Past Surgical History:   Procedure Laterality Date     C NONSPECIFIC PROCEDURE  1980    Hysterectomy due to menometrorrhagia.  Bladder repair.     CATARACT IOL, RT/LT  6/26/2008    left eye     CATARACT IOL, RT/LT  7/24/2008    right eye     COLONOSCOPY  04/16/2007     HERNIA REPAIR, UMBILICAL  09/27/2007    Incarcerated.     LASER YAG CAPSULOTOMY  5/22/2014    Procedure: LASER YAG CAPSULOTOMY;  Surgeon: Hebert Ledbetter MD;  Location: [facility] OR       Family History:    Family History   Problem Relation Age of Onset     DIABETES Maternal Grandmother      C.A.D. Sister      HEART DISEASE Brother      DIABETES Mother      C.A.D. Father      HEART DISEASE Brother      HEART DISEASE Brother      HEART DISEASE Sister      Respiratory Sister      DIABETES Daughter      Anesthesia Reaction No family hx of      Asthma No family hx of      Hypertension No family hx of      Breast Cancer No family hx of      Alcohol/Drug No family hx of      Alzheimer Disease No family hx of      Prostate Cancer No family hx of      Cancer - colorectal No family hx of      CEREBROVASCULAR DISEASE No family hx of      Allergies No family hx of      Arthritis No family hx of      Cardiovascular No family hx of      Circulatory No family hx of      Depression No family hx of       "Endocrine Disease No family hx of      Genetic Disorder No family hx of      GASTROINTESTINAL DISEASE No family hx of      Genitourinary Problems No family hx of      Blood Disease No family hx of      CANCER No family hx of      Congenital Anomalies No family hx of      Connective Tissue Disorder No family hx of      Eye Disorder No family hx of      Gynecology No family hx of      Obesity No family hx of      OSTEOPOROSIS No family hx of      Psychotic Disorder No family hx of      Musculoskeletal Disorder No family hx of      Lipids No family hx of      Neurologic Disorder No family hx of      Thyroid Disease No family hx of      Hearing Loss No family hx of        Social History:  Marital Status:   [5]  Social History   Substance Use Topics     Smoking status: Never Smoker     Smokeless tobacco: Never Used      Comment: no smokers in household     Alcohol use Yes      Comment: marcelo/waterx1-2/x1-2 q couple of months        Medications:      aspirin 81 MG tablet   cloNIDine (CATAPRES) 0.2 MG tablet   hydrALAZINE (APRESOLINE) 50 MG tablet   metoprolol (TOPROL-XL) 100 MG 24 hr tablet   MULTI-DAY OR TABS   VITAMIN E 400 UNIT OR CAPS   atorvastatin (LIPITOR) 20 MG tablet   biotin 1000 MCG TABS tablet   Cholecalciferol (VITAMIN D) 1000 UNIT capsule   COMPRESSION STOCKINGS   GLUCOSAMINE-CHONDROITIN OR TABS   order for DME         Review of Systems   Gastrointestinal: Positive for abdominal pain and diarrhea. Negative for nausea and vomiting.        Patient has melenic stools.   Neurological: Positive for weakness (in lower extremities). Negative for light-headedness.   All other systems reviewed and are negative.      Physical Exam   BP: 169/75  Pulse: 61  Temp: 97.5  F (36.4  C)  Resp: 19  Height: 149.9 cm (4' 11\")  Weight: 72.6 kg (160 lb)  SpO2: 99 %      Physical Exam   Constitutional: She appears well-developed and well-nourished.   HENT:   Head: Normocephalic and atraumatic.   Eyes: Conjunctivae are " normal. Pupils are equal, round, and reactive to light.   Neck: Normal range of motion. Neck supple.   Cardiovascular: Normal rate, regular rhythm and normal heart sounds.    No murmur heard.  Pulmonary/Chest: Effort normal and breath sounds normal. She has no wheezes. She has no rales.   Abdominal: Soft. Bowel sounds are normal. She exhibits no distension. There is tenderness in the right upper quadrant and right lower quadrant. There is no rebound and no guarding.   Musculoskeletal: Normal range of motion. She exhibits no edema.   Neurological: She is alert. No cranial nerve deficit.   Skin: Skin is warm and dry. There is pallor.   Psychiatric: She has a normal mood and affect. Her behavior is normal.       ED Course     ED Course     Procedures            Results for orders placed or performed in visit on 05/03/18 (from the past 24 hour(s))   CBC with platelets and differential   Result Value Ref Range    WBC 8.9 4.0 - 11.0 10e9/L    RBC Count 2.25 (L) 3.8 - 5.2 10e12/L    Hemoglobin 7.2 (L) 11.7 - 15.7 g/dL    Hematocrit 21.7 (L) 35.0 - 47.0 %    MCV 96 78 - 100 fl    MCH 32.0 26.5 - 33.0 pg    MCHC 33.2 31.5 - 36.5 g/dL    RDW 13.1 10.0 - 15.0 %    Platelet Count 317 150 - 450 10e9/L    Diff Method Automated Method     % Neutrophils 67.2 %    % Lymphocytes 23.2 %    % Monocytes 8.4 %    % Eosinophils 0.4 %    % Basophils 0.8 %    Absolute Neutrophil 6.0 1.6 - 8.3 10e9/L    Absolute Lymphocytes 2.1 0.8 - 5.3 10e9/L    Absolute Monocytes 0.8 0.0 - 1.3 10e9/L    Absolute Eosinophils 0.0 0.0 - 0.7 10e9/L    Absolute Basophils 0.1 0.0 - 0.2 10e9/L   Occult blood stool   Result Value Ref Range    Occult Blood Positive (A) NEG^Negative   *UA reflex to Microscopic   Result Value Ref Range    Color Urine Yellow     Appearance Urine Clear     Glucose Urine Negative NEG^Negative mg/dL    Bilirubin Urine Negative NEG^Negative    Ketones Urine Negative NEG^Negative mg/dL    Specific Gravity Urine 1.010 1.003 - 1.035     Blood Urine Negative NEG^Negative    pH Urine 6.0 5.0 - 7.0 pH    Protein Albumin Urine Negative NEG^Negative mg/dL    Urobilinogen Urine 0.2 0.2 - 1.0 EU/dL    Nitrite Urine Negative NEG^Negative    Leukocyte Esterase Urine Trace (A) NEG^Negative    Source Unspecified Urine    Urine Microscopic   Result Value Ref Range    WBC Urine 0 - 5 OTO5^0 - 5 /HPF    RBC Urine O - 2 OTO2^O - 2 /HPF    Squamous Epithelial /LPF Urine Few FEW^Few /LPF    Bacteria Urine Few (A) NEG^Negative /HPF       Labs are reviewed from earlier today and patient did have a positive occult blood stool and patient's hemoglobin was 7.2.  Unfortunately we do not have any capabilities for a scope here at the hospital so the patient will need to be transferred.  Patient requested to go to [facility].  Patient is not orthostatic and has had no black stools since being here.  Patient is requesting to go to [facility] via private car.  I discussed the risk and benefits of this decision, since patient has been very stable here I think it is safe for the patient go by private car.    Assessments & Plan (with Medical Decision Making)  Upper GI bleed, blood loss anemia     I have reviewed the nursing notes.    I have reviewed the findings, diagnosis, plan and need for follow up with the patient.        This document serves as a record of services personally performed by Alejandro Shin MD. It was created on their behalf by Brigette Blevins, a trained medical scribe. The creation of this record is based on the provider's personal observations and the statements of the patient. This document has been checked and approved by the attending provider.  Note: Chart documentation done in part with Dragon Voice Recognition software. Although reviewed after completion, some word and grammatical errors may remain.  5/3/2018   [facility] EMERGENCY DEPARTMENT     Alejandro Shin MD  05/03/18 [id number]

## 2018-05-03 NOTE — ED TRIAGE NOTES
Was in clinic earlier today for diarrhea after 1 week and was taking pepto and then stools turned from red to black.  Did check in clinic and it was positive for blood in stool, is pale in color and is feeling weak and tired.

## 2018-05-03 NOTE — NURSING NOTE
"  Chief Complaint   Patient presents with     Pain     Panel Management       Initial /82 (BP Location: Right arm, Patient Position: Chair, Cuff Size: Adult Regular)  Pulse 73  Temp 97.6  F (36.4  C) (Temporal)  Resp 16  Ht 4' 11.7\" (1.516 m)  Wt 160 lb (72.6 kg)  SpO2 100%  BMI 31.56 kg/m2 Estimated body mass index is 31.56 kg/(m^2) as calculated from the following:    Height as of this encounter: 4' 11.7\" (1.516 m).    Weight as of this encounter: 160 lb (72.6 kg).  Medication Reconciliation: complete    "

## 2018-05-03 NOTE — MR AVS SNAPSHOT
"              After Visit Summary   5/3/2018    Nina Villalpando    MRN: [id number]           Patient Information     Date Of Birth          1930        Visit Information        Provider Department      5/3/2018 11:40 AM Kyra Dangelo MD [facility]        Today's Diagnoses     Melena    -  1       Follow-ups after your visit        Follow-up notes from your care team     Return in about 2 weeks (around 2018).      Who to contact     If you have questions or need follow up information about today's clinic visit or your schedule please contact [facility] [place] directly at [phone].  Normal or non-critical lab and imaging results will be communicated to you by MyChart, letter or phone within 4 business days after the clinic has received the results. If you do not hear from us within 7 days, please contact the clinic through MyChart or phone. If you have a critical or abnormal lab result, we will notify you by phone as soon as possible.  Submit refill requests through [facility] or call your pharmacy and they will forward the refill request to us. Please allow 3 business days for your refill to be completed.          Additional Information About Your Visit        MyChart Information     [facility] lets you send messages to your doctor, view your test results, renew your prescriptions, schedule appointments and more. To sign up, go to [email or web address] . Click on \"Log in\" on the left side of the screen, which will take you to the Welcome page. Then click on \"Sign up Now\" on the right side of the page.     You will be asked to enter the access code listed below, as well as some personal information. Please follow the directions to create your username and password.     Your access code is: [email or web address]  Expires: 2018 12:34 PM     Your access code will  in 90 days. If you need help or a new code, please call your [facility] or [phone].        Care " "EveryWhere ID     This is your Care EveryWhere ID. This could be used by other organizations to access your [place] medical records  [id number]        Your Vitals Were     Pulse Temperature Respirations Height Pulse Oximetry BMI (Body Mass Index)    73 97.6  F (36.4  C) (Temporal) 16 4' 11.7\" (1.516 m) 100% 31.56 kg/m2       Blood Pressure from Last 3 Encounters:   05/03/18 128/82   08/09/17 124/78   04/25/17 140/72    Weight from Last 3 Encounters:   05/03/18 160 lb (72.6 kg)   08/22/17 165 lb (74.8 kg)   08/09/17 165 lb (74.8 kg)              We Performed the Following     *UA reflex to Microscopic     CBC with platelets and differential     Comprehensive metabolic panel (BMP + Alb, Alk Phos, ALT, AST, Total. Bili, TP)     Lipid panel reflex to direct LDL Fasting     Occult blood stool     TSH with free T4 reflex        Primary Care Provider Office Phone # Fax #    Patricia F MD [place] [phone]       [address] [place] [address]        Equal Access to Services     [facility]: Hadii tarsha jameson hadasho Somichaelali, waaxda luqadaha, qaybta kaalmada adedesmondyadani, tim angeles . [facility] [phone].    ATENCIÓN: Si habla español, tiene a reaves disposición servicios gratuitos de asistencia lingüística. [facility] [phone].    We comply with applicable federal civil rights laws and [place] laws. We do not discriminate on the basis of race, color, national origin, age, disability, sex, sexual orientation, or gender identity.            Thank you!     Thank you for choosing [facility]  for your care. Our goal is always to provide you with excellent care. Hearing back from our patients is one way we can continue to improve our services. Please take a few minutes to complete the written survey that you may receive in the mail after your visit with us. Thank you!             Your Updated Medication List - Protect others around you: Learn how to " safely use, store and throw away your medicines at www.disposemymeds.org.          This list is accurate as of 5/3/18 12:34 PM.  Always use your most recent med list.                   Brand Name Dispense Instructions for use Diagnosis    aspirin 81 MG tablet      Take 1 tablet by mouth daily.        atorvastatin 20 MG tablet    LIPITOR    90 tablet    Take 1 tablet (20 mg) by mouth daily    Hyperlipidemia, unspecified       biotin 1000 MCG Tabs tablet      Take 1 tablet by mouth daily.        cloNIDine 0.2 MG tablet    CATAPRES    180 tablet    Take 1 tablet (0.2 mg) by mouth 2 times daily    HTN, goal below 150/90       COMPRESSION STOCKINGS     2 each    Lower extremities compression stockings Below the knees Mild pressure 15-20 MMHG    Varicose veins of legs       Glucosamine-Chondroitin Tabs     0    1 tablet qd        hydrALAZINE 50 MG tablet    APRESOLINE    270 tablet    Take 1 tablet (50 mg) by mouth 3 times daily    HTN, goal below 150/90       metoprolol succinate 100 MG 24 hr tablet    TOPROL-XL    135 tablet    TAKE ONE & ONE-HALF TABLETS BY MOUTH ONCE DAILY AT BEDTIME    HTN, goal below 150/90       MULTI-DAY Tabs           order for DME     1 Device    Equipment being ordered: Blood pressure monitor    HTN, goal below 150/90       vitamin D 1000 units capsule      Take 1 capsule by mouth daily Not taking every day        vitamin E 400 UNIT capsule     0    1 qd

## 2018-05-04 ENCOUNTER — PATIENT OUTREACH (OUTPATIENT)
Dept: CARE COORDINATION | Facility: CLINIC | Age: 83
End: 2018-05-04

## 2018-05-04 NOTE — PROGRESS NOTES
Clinic Care Coordination Contact  [facility]/Voicemail       Clinical Data: Care Coordinator Outreach  Chart review notes that pt possible admission at [facility] after evaluation at [facility] ED  Care everywhere did not have confirmation of admission, so outreach call placed for status update.   Outreach attempted x 1.  Left message on voicemail with call back information and requested return call.  Plan: RN CC will outreach in 1-2 weeks will monitor chart for discharge , will be available sooner if needed. Contact information given.       Noemí Arthur RN  Clinic Care Coordinator  Mobile: [phone]  [email or web address]

## 2018-05-09 ENCOUNTER — PATIENT OUTREACH (OUTPATIENT)
Dept: CARE COORDINATION | Facility: CLINIC | Age: 83
End: 2018-05-09

## 2018-05-09 NOTE — LETTER
[place] CARE COORDINATION  [address]  [facility] [address]        May 9, 2018    Nina Villalpando  [address] [address]      Dear Nina,    I am a clinic care coordinator who works with Kyra Dangelo MD. I wanted to thank you for spending the time to talk with me.  I wanted to introduce myself and provide you with my contact information so that you can call me with questions or concerns about your health care. Below is a description of clinic care coordination and how I can further assist you.     The clinic care coordinator is a registered nurse and/or  who understand the health care system. The goal of clinic care coordination is to help you manage your health and improve access to the [facility] system in the most efficient manner. The registered nurse can assist you in meeting your health care goals by providing education, coordinating services, and strengthening the communication among your providers. The  can assist you with financial, behavioral, psychosocial, chemical dependency, counseling, and/or psychiatric resources.    Please feel free to contact me at [phone], with any questions or concerns. We at [facility] are focused on providing you with the highest-quality healthcare experience possible and that all starts with you.     Sincerely,     Noemí Arthur    Enclosed: I have enclosed a copy of a 24 Hour Access Plan. This has helpful phone numbers for you to call when needed. Please keep this in an easy to access place to use as needed.

## 2018-05-09 NOTE — PROGRESS NOTES
SUBJECTIVE:   Nina Villalpando is a 87 year old female who presents to clinic today for the following health issues:      HPI      Hospital Follow-up Visit:    Hospital/Nursing Home/ Rehab Facility: [facility]  Date of Admission: 5/3/18  Date of Discharge: 5/8/18  Reason(s) for Admission: Dark Stools, Dizziness & Weakness            Problems taking medications regularly:  None       Medication changes since discharge: Start: Ferrous Gluconate 324 MG       Problems adhering to non-medication therapy:  None    Summary of hospitalization:  CareEverwhere information obtained and reviewed  Diagnostic Tests/Treatments reviewed.  Follow up needed: Follow-up on blood pressures and anemia  Other Healthcare Providers Involved in Patient s Care:         None  Update since discharge: improved.     Post Discharge Medication Reconciliation: discharge medications reconciled, continue medications without change.  Plan of care communicated with patient     Coding guidelines for this visit:  Type of Medical   Decision Making Face-to-Face Visit       within 7 Days of discharge Face-to-Face Visit        within 14 days of discharge   Moderate Complexity [id number]   High Complexity 49445 [address]7086            Problem list and histories reviewed & adjusted, as indicated.  Additional history: as documented        Patient Active Problem List   Diagnosis     Primary osteoarthritis of both knees     Advanced directives, counseling/discussion     Varicose veins of legs     Essential hypertension     Chronic rhinitis     CHAU (renal artery stenosis) (H)     Hair loss     Health Care Home     Aortic stenosis     Hyperlipidemia, unspecified     Past Surgical History:   Procedure Laterality Date     C NONSPECIFIC PROCEDURE  1980    Hysterectomy due to menometrorrhagia.  Bladder repair.     CATARACT IOL, RT/LT  6/26/2008    left eye     CATARACT IOL, RT/LT  7/24/2008    right eye     COLONOSCOPY  04/16/2007     HERNIA REPAIR, UMBILICAL   09/27/2007    Incarcerated.     LASER YAG CAPSULOTOMY  5/22/2014    Procedure: LASER YAG CAPSULOTOMY;  Surgeon: Hebert Ledbetter MD;  Location:  OR       Social History   Substance Use Topics     Smoking status: Never Smoker     Smokeless tobacco: Never Used      Comment: no smokers in household     Alcohol use Yes      Comment: marcelo/waterx1-2/x1-2 q couple of months     Family History   Problem Relation Age of Onset     DIABETES Maternal Grandmother      C.A.D. Sister      HEART DISEASE Brother      DIABETES Mother      C.A.D. Father      HEART DISEASE Brother      HEART DISEASE Brother      HEART DISEASE Sister      Respiratory Sister      DIABETES Daughter      Anesthesia Reaction No family hx of      Asthma No family hx of      Hypertension No family hx of      Breast Cancer No family hx of      Alcohol/Drug No family hx of      Alzheimer Disease No family hx of      Prostate Cancer No family hx of      Cancer - colorectal No family hx of      CEREBROVASCULAR DISEASE No family hx of      Allergies No family hx of      Arthritis No family hx of      Cardiovascular No family hx of      Circulatory No family hx of      Depression No family hx of      Endocrine Disease No family hx of      Genetic Disorder No family hx of      GASTROINTESTINAL DISEASE No family hx of      Genitourinary Problems No family hx of      Blood Disease No family hx of      CANCER No family hx of      Congenital Anomalies No family hx of      Connective Tissue Disorder No family hx of      Eye Disorder No family hx of      Gynecology No family hx of      Obesity No family hx of      OSTEOPOROSIS No family hx of      Psychotic Disorder No family hx of      Musculoskeletal Disorder No family hx of      Lipids No family hx of      Neurologic Disorder No family hx of      Thyroid Disease No family hx of      Hearing Loss No family hx of          Current Outpatient Prescriptions   Medication Sig Dispense Refill     atorvastatin  "(LIPITOR) 20 MG tablet Take 1 tablet (20 mg) by mouth daily 90 tablet 3     biotin 1000 MCG TABS tablet Take 1 tablet by mouth daily.       Blood Pressure Monitor KIT 1 Device daily Use to check your blood pressure every day. (Wrist monitor) 1 kit 0     Cholecalciferol (VITAMIN D) 1000 UNIT capsule Take 1 capsule by mouth daily Not taking every day       cloNIDine (CATAPRES) 0.2 MG tablet Take 1 tablet (0.2 mg) by mouth 2 times daily 180 tablet 3     COMPRESSION STOCKINGS Lower extremities compression stockings  Below the knees  Mild pressure 15-20 MMHG         2 each 0     Ferrous Gluconate 324 (37.5 Fe) MG TABS Take 324 mg by mouth daily       GLUCOSAMINE-CHONDROITIN OR TABS 1 tablet qd 0 0     hydrALAZINE (APRESOLINE) 50 MG tablet Take 1 tablet (50 mg) by mouth 3 times daily 270 tablet 1     metoprolol (TOPROL-XL) 100 MG 24 hr tablet TAKE ONE & ONE-HALF TABLETS BY MOUTH ONCE DAILY AT BEDTIME 135 tablet 3     MULTI-DAY OR TABS   0     order for DME Equipment being ordered: Blood pressure monitor 1 Device 0     VITAMIN E 400 UNIT OR CAPS 1 qd  0 0     aspirin 81 MG tablet Take 1 tablet by mouth daily.       Allergies   Allergen Reactions     No Known Drug Allergies      BP Readings from Last 3 Encounters:   05/11/18 138/70   05/03/18 161/74   05/03/18 128/82    Wt Readings from Last 3 Encounters:   05/11/18 157 lb (71.2 kg)   05/03/18 160 lb (72.6 kg)   05/03/18 160 lb (72.6 kg)                  Labs reviewed in EPIC    ROS:  Constitutional, HEENT, cardiovascular, pulmonary, GI, , musculoskeletal, neuro, skin, endocrine and psych systems are negative, except as otherwise noted.    OBJECTIVE:     /70 (BP Location: Left arm, Patient Position: Chair, Cuff Size: Adult Regular)  Pulse 60  Temp 97.6  F (36.4  C) (Temporal)  Resp 16  Ht 4' 11\" (1.499 m)  Wt 157 lb (71.2 kg)  SpO2 100%  BMI 31.71 kg/m2  Body mass index is 31.71 kg/(m^2).   Physical Exam   Constitutional: She is oriented to person, place, and " time. She appears well-developed and well-nourished.   HENT:   Head: Normocephalic and atraumatic.   Eyes: EOM are normal.   Neck: Neck supple.   Cardiovascular: Normal rate, regular rhythm, normal heart sounds and intact distal pulses.  Exam reveals no gallop.    No murmur heard.  Pulmonary/Chest: Effort normal and breath sounds normal.   Abdominal: Soft. Bowel sounds are normal.   Neurological: She is alert and oriented to person, place, and time.   Psychiatric: She has a normal mood and affect.         Diagnostic Test Results:  none     ASSESSMENT/PLAN:     Problem List Items Addressed This Visit     None      Visit Diagnoses     Benign essential hypertension    -  Primary    Relevant Medications    Blood Pressure Monitor KIT    Other Relevant Orders    CBC with platelets and differential (Completed)    Anemia, unspecified type        Relevant Medications    Ferrous Gluconate 324 (37.5 Fe) MG TABS    Other Relevant Orders    CBC with platelets and differential (Completed)           This is a pleasant 87-year-old with history of hypertension, aortic stenosis, hyperlipidemia who is here for post hospital follow-up.  She was sent to the emergency department from the clinic last week after she was noted to have a significant drop in her hemoglobin and melena raising concerns for possible GI bleed.  She was admitted to [facility] and had an EGD and colonoscopy performed.  The EGD came back essentially normal.  Colonoscopy did not reveal [place] diverticuli which could be the source of the bleeding.  She was also transfused 2 units of blood.  During the hospital stay her blood pressures with significantly elevated and was advised to follow-up on these after discharge today blood pressures are well controlled on the current medications including clonidine .,  Hydralazine and metoprolol.  She did start taking iron supplements twice a day.  Will repeat hemoglobin levels today to follow-up.  I advised her to check  blood pressures at home to keep track and return to the clinic in 1 month with a blood pressure log to consider medication changes.      Kyra Dangelo MD  [facility]

## 2018-05-09 NOTE — LETTER
Health Care Home - Access Care Plan    About Me  Patient Name:  Nina Villalpando    YOB: 1930  Age:                             87 year old   [place] MRN:            [id number] Telephone Information:     Home Phone [phone]   Mobile Not on file.       Address:    [address]  [place] [address] Email address:  No e-mail address on record      Emergency Contact(s)  Name Relationship Lgl Grd Work Phone Home Phone Mobile Phone   ANGEL CABRERA Daughter   [phone]    2. NONE PER PATIE*                  Health Maintenance: Routine Health maintenance Reviewed: Up to date    My Access Plan  Medical Emergency 911   Questions or concerns during clinic hours Primary Clinic Line, I will call the clinic directly: [facility] - [phone]   24 Hour Appointment Line [phone] or  [phone]) (toll free)   24 Hour Nurse Line [phone] (toll free)   Questions or concerns outside clinic hours 24 Hour Appointment Line, I will call the after-hours on-call line:   [facility] [phone] or [phone]) (toll-free)   Preferred Urgent Care     Preferred Hospital [facility]  [phone]   Preferred Pharmacy [facility] [address] [place], [place] - [address] Behavioral Health Crisis Line The National Suicide Prevention Lifeline at 1-238.761.7084 or 910     My Care Team Members  Patient Care Team       Relationship Specialty Notifications Start End    Kyra Dangelo MD PCP - General Family Practice  5/3/18     Phone: [phone] Fax: [phone]         [address] [facility] [address] [facility] [address]           My Medical and Care Information  Problem List   Patient Active Problem List   Diagnosis     Primary osteoarthritis of both knees     Advanced directives, counseling/discussion     Varicose veins of legs     Essential hypertension     Chronic rhinitis     CHAU (renal artery stenosis) (H)     Hair  [facility]     Aortic stenosis     Hyperlipidemia, unspecified      Current Medications and Allergies:  See printed Medication Report

## 2018-05-09 NOTE — PROGRESS NOTES
Clinic Care Coordination Contact  [facility]/Voicemail       Clinical Data: Care Coordinator Outreach  Outreach attempted x 2.  Left message on voicemail with call back information and requested return call.  Plan:Chart review note pt is schedule for follow up visit with PCP on 5-11-18  Care Coordinator will try to reach patient again in 3-5 business days.    Noemí Arthur RN  Clinic Care Coordinator  Mobile: [phone]  [email or web address]      Clinic Care Coordination Contact    Clinic Care Coordination Contact  OUTREACH    Referral Information:  Referral Source: IP Report         Chief Complaint   Patient presents with     Clinic Care Coordination - Post Hospital     DC'd from [facility] on 5/8/18 to home self care        Universal Utilization: family able to drive to medical visit  Clinic Utilization  Difficulty keeping appointments:: No  Utilization    Last refreshed: 5/9/2018  5:01 PM:  No Show Count (past year) 0       Last refreshed: 5/9/2018  5:01 PM:  ED visits 1       Last refreshed: 5/9/2018  5:01 PM:  Hospital admissions 0          Current as of: 5/9/2018  5:01 PM             Clinical Concerns:  Current Medical Concerns: PT was evaluated at [facility]       Hypertension (Primary Dx);   Acute GI bleeding  Pt reports doing better and not having any concern. Pt reports good supports with adult daughter and family as well as neighbors who check in on her.     Pt statesNo dietary restriction or concerns noted - good appetite - had a burger last night without issue    Medication Management:  Did review med list as pt self doses and states that she will bring recommendations and current med list to discuss with  PCP at upcoming follow up visit on 5-11-18    Functional Status:  Bed or wheelchair confined:: No  Mobility Status: Independent      Living Situation:  Current living arrangement:: I live in a private home  Type of residence:: Private home - no stairs    Transportation:  Transportation concerns (GOAL)::  No  Transportation means:: Family     Psychosocial:  Adventism or spiritual beliefs that impact treatment:: No  Mental health DX:: No  Mental health management concern (GOAL):: No  Informal Support system:: Family     Financial/Insurance:   Financial/Insurance concerns (GOAL):: No     Resources and Interventions:  Advance Care Plan/Directive  Advanced Care Plans/Directives on file:: No     Patient/Caregiver understanding:Patient verbalized understanding, engaged in AIDET communication behavior during encounter.         Future Appointments              In 2 days Kyra Dangelo MD [facility]    In 1 week Kyra Dangelo MD [facility]          Plan:Patient will attend scheduled follow up with PCP as recommended.     RN CC will be available in future if needed, contact information given, no further outreach at this time.   Noemí Arthur RN  Clinic Care Coordinator  Mobile: [phone]  [email or web address]

## 2018-05-11 ENCOUNTER — OFFICE VISIT (OUTPATIENT)
Dept: FAMILY MEDICINE | Facility: OTHER | Age: 83
End: 2018-05-11
Payer: COMMERCIAL

## 2018-05-11 VITALS
SYSTOLIC BLOOD PRESSURE: 138 MMHG | WEIGHT: 157 LBS | HEIGHT: 59 IN | DIASTOLIC BLOOD PRESSURE: 70 MMHG | RESPIRATION RATE: 16 BRPM | BODY MASS INDEX: 31.65 KG/M2 | OXYGEN SATURATION: 100 % | HEART RATE: 60 BPM | TEMPERATURE: 97.6 F

## 2018-05-11 DIAGNOSIS — I10 BENIGN ESSENTIAL HYPERTENSION: Primary | ICD-10-CM

## 2018-05-11 DIAGNOSIS — D64.9 ANEMIA, UNSPECIFIED TYPE: ICD-10-CM

## 2018-05-11 LAB
BASOPHILS # BLD AUTO: 0.1 10E9/L (ref 0–0.2)
BASOPHILS NFR BLD AUTO: 0.8 %
DIFFERENTIAL METHOD BLD: ABNORMAL
EOSINOPHIL # BLD AUTO: 0.2 10E9/L (ref 0–0.7)
EOSINOPHIL NFR BLD AUTO: 2.1 %
ERYTHROCYTE [DISTWIDTH] IN BLOOD BY AUTOMATED COUNT: 13.2 % (ref 10–15)
HCT VFR BLD AUTO: 27.1 % (ref 35–47)
HGB BLD-MCNC: 8.9 G/DL (ref 11.7–15.7)
LYMPHOCYTES # BLD AUTO: 1.1 10E9/L (ref 0.8–5.3)
LYMPHOCYTES NFR BLD AUTO: 14.1 %
MCH RBC QN AUTO: 30.6 PG (ref 26.5–33)
MCHC RBC AUTO-ENTMCNC: 32.8 G/DL (ref 31.5–36.5)
MCV RBC AUTO: 93 FL (ref 78–100)
MONOCYTES # BLD AUTO: 0.6 10E9/L (ref 0–1.3)
MONOCYTES NFR BLD AUTO: 8.4 %
NEUTROPHILS # BLD AUTO: 5.6 10E9/L (ref 1.6–8.3)
NEUTROPHILS NFR BLD AUTO: 74.6 %
PLATELET # BLD AUTO: 404 10E9/L (ref 150–450)
RBC # BLD AUTO: 2.91 10E12/L (ref 3.8–5.2)
WBC # BLD AUTO: 7.5 10E9/L (ref 4–11)

## 2018-05-11 PROCEDURE — 99214 OFFICE O/P EST MOD 30 MIN: CPT | Performed by: FAMILY MEDICINE

## 2018-05-11 PROCEDURE — 36415 COLL VENOUS BLD VENIPUNCTURE: CPT | Performed by: FAMILY MEDICINE

## 2018-05-11 PROCEDURE — 85025 COMPLETE CBC W/AUTO DIFF WBC: CPT | Performed by: FAMILY MEDICINE

## 2018-05-11 RX ORDER — FERROUS GLUCONATE 324(37.5)
324 TABLET ORAL DAILY
COMMUNITY
Start: 2018-05-08 | End: 2018-05-22

## 2018-05-11 ASSESSMENT — PAIN SCALES - GENERAL: PAINLEVEL: NO PAIN (0)

## 2018-05-11 NOTE — MR AVS SNAPSHOT
After Visit Summary   5/11/2018    Nina Vlilalpando    MRN: [id number]           Patient Information     Date Of Birth          9/21/1930        Visit Information        Provider Department      5/11/2018 11:00 AM Kyra Dangelo MD [facility]        Today's Diagnoses     Benign essential hypertension    -  1    Anemia, unspecified type           Follow-ups after your visit        Follow-up notes from your care team     Return in about 1 month (around 6/11/2018).      Your next 10 appointments already scheduled     May 17, 2018 11:00 AM CDT   Office Visit with Kyra Dangelo MD   [facility] ([facility])    [address] [address] [phone]           Bring a current list of meds and any records pertaining to this visit. For Physicals, please bring immunization records and any forms needing to be filled out. Please arrive 10 minutes early to complete paperwork.              Who to contact     If you have questions or need follow up information about today's clinic visit or your schedule please contact [facility] directly at [phone].  Normal or non-critical lab and imaging results will be communicated to you by MyChart, letter or phone within 4 business days after the clinic has received the results. If you do not hear from us within 7 days, please contact the clinic through [employer] or phone. If you have a critical or abnormal lab result, we will notify you by phone as soon as possible.  Submit refill requests through [employer] or call your pharmacy and they will forward the refill request to us. Please allow 3 business days for your refill to be completed.          Additional Information About Your Visit        MyChart Information     [employer] lets you send messages to your doctor, view your test results, renew your prescriptions, schedule appointments and more. To sign up, go to [email or web address]  ". Click on \"Log in\" on the left side of the screen, which will take you to the Welcome page. Then click on \"Sign up Now\" on the right side of the page.     You will be asked to enter the access code listed below, as well as some personal information. Please follow the directions to create your username and password.     Your access code is: [email or web address]  Expires: 2018 12:34 PM     Your access code will  in 90 days. If you need help or a new code, please call your [facility] or [phone].        Care EveryWhere ID     This is your Care EveryWhere ID. This could be used by other organizations to access your [facility] medical records  [id number]        Your Vitals Were     Pulse Temperature Respirations Height Pulse Oximetry BMI (Body Mass Index)    60 97.6  F (36.4  C) (Temporal) 16 4' 11\" (1.499 m) 100% 31.71 kg/m2       Blood Pressure from Last 3 Encounters:   18 138/70   18 161/74   18 128/82    Weight from Last 3 Encounters:   18 157 lb (71.2 kg)   18 160 lb (72.6 kg)   18 160 lb (72.6 kg)              We Performed the Following     CBC with platelets and differential          Today's Medication Changes          These changes are accurate as of 18 11:31 AM.  If you have any questions, ask your nurse or doctor.               Start taking these medicines.        Dose/Directions    Blood Pressure Monitor Kit   Used for:  Benign essential hypertension   Started by:  Kyra Dangelo MD        Dose:  1 Device   1 Device daily Use to check your blood pressure every day. (Wrist monitor)   Quantity:  1 kit   Refills:  0            Where to get your medicines      Some of these will need a paper prescription and others can be bought over the counter.  Ask your nurse if you have questions.     Bring a paper prescription for each of these medications     Blood Pressure Monitor Kit                Primary Care Provider Office Phone # Fax #    Kyra Dangelo MD " [phone]       [address] [facility] [address]  [facility] [address]        Equal Access to Services     LEISA HAN : Hadii aad ku hadhollistayler Tyler, waalcirada eduardoheberha, luanata kadhavalda mony, tim bhavikin hayaan floridesmond becker laDoreenkai brothers. So [facility] [phone].    ATENCIÓN: Si habla español, tiene a reaves disposición servicios gratuitos de asistencia lingüística. Gregame al [phone].    We comply with applicable federal civil rights laws and [place] laws. We do not discriminate on the basis of race, color, national origin, age, disability, sex, sexual orientation, or gender identity.            Thank you!     Thank you for choosing [facility]  for your care. Our goal is always to provide you with excellent care. Hearing back from our patients is one way we can continue to improve our services. Please take a few minutes to complete the written survey that you may receive in the mail after your visit with us. Thank you!             Your Updated Medication List - Protect others around you: Learn how to safely use, store and throw away your medicines at www.disposemymeds.org.          This list is accurate as of 5/11/18 11:31 AM.  Always use your most recent med list.                   Brand Name Dispense Instructions for use Diagnosis    aspirin 81 MG tablet      Take 1 tablet by mouth daily.        atorvastatin 20 MG tablet    LIPITOR    90 tablet    Take 1 tablet (20 mg) by mouth daily    Hyperlipidemia, unspecified       biotin 1000 MCG Tabs tablet      Take 1 tablet by mouth daily.        Blood Pressure Monitor Kit     1 kit    1 Device daily Use to check your blood pressure every day. (Wrist monitor)    Benign essential hypertension       cloNIDine 0.2 MG tablet    CATAPRES    180 tablet    Take 1 tablet (0.2 mg) by mouth 2 times daily    HTN, goal below 150/90       COMPRESSION STOCKINGS     2 each    Lower extremities compression stockings Below the knees Mild pressure 15-20 MMHG    Varicose  veins of legs       Ferrous Gluconate 324 (37.5 Fe) MG Tabs      Take 324 mg by mouth daily        Glucosamine-Chondroitin Tabs     0    1 tablet qd        hydrALAZINE 50 MG tablet    APRESOLINE    270 tablet    Take 1 tablet (50 mg) by mouth 3 times daily    HTN, goal below 150/90       metoprolol succinate 100 MG 24 hr tablet    TOPROL-XL    135 tablet    TAKE ONE & ONE-HALF TABLETS BY MOUTH ONCE DAILY AT BEDTIME    HTN, goal below 150/90       MULTI-DAY Tabs           order for DME     1 Device    Equipment being ordered: Blood pressure monitor    HTN, goal below 150/90       vitamin D 1000 units capsule      Take 1 capsule by mouth daily Not taking every day        vitamin E 400 UNIT capsule     0    1 qd

## 2018-05-16 ENCOUNTER — TELEPHONE (OUTPATIENT)
Dept: FAMILY MEDICINE | Facility: OTHER | Age: 83
End: 2018-05-16

## 2018-05-16 NOTE — TELEPHONE ENCOUNTER
Reason for call:  Patient reporting a symptom    Symptom or request: Pt was discharged Tuesday, she has been very itchy since, today she got up and her eyes are swollen and red, she took on benedryl, this did not help      Duration (how long have symptoms been present): ongoing since Tuesday     Have you been treated for this before? No    Additional comments: none    Phone Number patient can be reached at:  Home number on file [phone] (home)    Best Time:  any    Can we leave a detailed message on this number:  YES    Call taken on 5/16/2018 at 4:07 PM by Mai Pimentel

## 2018-05-16 NOTE — TELEPHONE ENCOUNTER
Nina Villalpando is a 87 year old female who calls with itching.    NURSING ASSESSMENT:  Description: Spoke with patient. itchiness started last week and still going on.  Both eyes were swollen this am, left was red around it.  Did try benadryl and not much improve.  she's better then she was this morning. But still would like to be seen.  Wondering if its from the flowers she got in the hospital.    Allergies:   Allergies   Allergen Reactions     No Known Drug Allergies        MEDICATIONS:   Taking medication(s) as prescribed? Yes  Taking over the counter medication(s?) Yes  Any medication side effects? No significant side effects    Any barriers to taking medication(s) as prescribed?  No  Medication(s) improving/managing symptoms?  No  Medication reconciliation completed: Yes      NURSING PLAN: Nursing advice to patient should be seen if not improving on otc will try another does as it did seem to improve since this am.    RECOMMENDED DISPOSITION:  See in 24 hours -   Will comply with recommendation: Yes  If further questions/concerns or if symptoms do not improve, worsen or new symptoms develop, call your PCP or [facility] as soon as possible.      Guideline used: eye problem  Telephone Triage Protocols for Nurses, Fifth Edition, Shavon Flores, JOANNA, BSN

## 2018-05-17 ENCOUNTER — HOSPITAL ENCOUNTER (EMERGENCY)
Facility: CLINIC | Age: 83
Discharge: HOME | End: 2018-05-17
Attending: PHYSICIAN ASSISTANT | Admitting: PHYSICIAN ASSISTANT
Payer: COMMERCIAL

## 2018-05-17 VITALS
SYSTOLIC BLOOD PRESSURE: 190 MMHG | HEART RATE: 82 BPM | DIASTOLIC BLOOD PRESSURE: 80 MMHG | RESPIRATION RATE: 20 BRPM | TEMPERATURE: 97.3 F | OXYGEN SATURATION: 96 %

## 2018-05-17 DIAGNOSIS — R42 LIGHTHEADEDNESS: ICD-10-CM

## 2018-05-17 DIAGNOSIS — R41.0 CONFUSION: ICD-10-CM

## 2018-05-17 DIAGNOSIS — R53.1 WEAKNESS: ICD-10-CM

## 2018-05-17 DIAGNOSIS — N39.0 URINARY TRACT INFECTION IN FEMALE: ICD-10-CM

## 2018-05-17 DIAGNOSIS — L29.9 GENERALIZED PRURITUS: ICD-10-CM

## 2018-05-17 LAB
ABO + RH BLD: NORMAL
ABO + RH BLD: NORMAL
ALBUMIN SERPL-MCNC: 3.3 G/DL (ref 3.4–5)
ALBUMIN UR-MCNC: NEGATIVE MG/DL
ALP SERPL-CCNC: 93 U/L (ref 40–150)
ALT SERPL W P-5'-P-CCNC: 13 U/L (ref 0–50)
ANION GAP SERPL CALCULATED.3IONS-SCNC: 10 MMOL/L (ref 3–14)
APPEARANCE UR: CLEAR
AST SERPL W P-5'-P-CCNC: 25 U/L (ref 0–45)
BASOPHILS # BLD AUTO: 0 10E9/L (ref 0–0.2)
BASOPHILS NFR BLD AUTO: 0.3 %
BILIRUB SERPL-MCNC: 0.4 MG/DL (ref 0.2–1.3)
BILIRUB UR QL STRIP: NEGATIVE
BLD GP AB SCN SERPL QL: NORMAL
BLOOD BANK CMNT PATIENT-IMP: NORMAL
BUN SERPL-MCNC: 18 MG/DL (ref 7–30)
CALCIUM SERPL-MCNC: 8.4 MG/DL (ref 8.5–10.1)
CHLORIDE SERPL-SCNC: 99 MMOL/L (ref 94–109)
CO2 SERPL-SCNC: 25 MMOL/L (ref 20–32)
COLOR UR AUTO: COLORLESS
CREAT SERPL-MCNC: 0.96 MG/DL (ref 0.52–1.04)
DIFFERENTIAL METHOD BLD: ABNORMAL
EOSINOPHIL # BLD AUTO: 0.3 10E9/L (ref 0–0.7)
EOSINOPHIL NFR BLD AUTO: 3.4 %
ERYTHROCYTE [DISTWIDTH] IN BLOOD BY AUTOMATED COUNT: 13.3 % (ref 10–15)
GFR SERPL CREATININE-BSD FRML MDRD: 55 ML/MIN/1.7M2
GLUCOSE SERPL-MCNC: 117 MG/DL (ref 70–99)
GLUCOSE UR STRIP-MCNC: NEGATIVE MG/DL
HCT VFR BLD AUTO: 27.6 % (ref 35–47)
HGB BLD-MCNC: 8.9 G/DL (ref 11.7–15.7)
HGB UR QL STRIP: NEGATIVE
IMM GRANULOCYTES # BLD: 0 10E9/L (ref 0–0.4)
IMM GRANULOCYTES NFR BLD: 0.2 %
KETONES UR STRIP-MCNC: NEGATIVE MG/DL
LEUKOCYTE ESTERASE UR QL STRIP: ABNORMAL
LYMPHOCYTES # BLD AUTO: 1 10E9/L (ref 0.8–5.3)
LYMPHOCYTES NFR BLD AUTO: 11.1 %
MCH RBC QN AUTO: 29.7 PG (ref 26.5–33)
MCHC RBC AUTO-ENTMCNC: 32.2 G/DL (ref 31.5–36.5)
MCV RBC AUTO: 92 FL (ref 78–100)
MONOCYTES # BLD AUTO: 0.7 10E9/L (ref 0–1.3)
MONOCYTES NFR BLD AUTO: 7.2 %
MUCOUS THREADS #/AREA URNS LPF: PRESENT /LPF
NEUTROPHILS # BLD AUTO: 7 10E9/L (ref 1.6–8.3)
NEUTROPHILS NFR BLD AUTO: 77.8 %
NITRATE UR QL: NEGATIVE
PH UR STRIP: 7 PH (ref 5–7)
PLATELET # BLD AUTO: 391 10E9/L (ref 150–450)
POTASSIUM SERPL-SCNC: 3.7 MMOL/L (ref 3.4–5.3)
PROT SERPL-MCNC: 7.1 G/DL (ref 6.8–8.8)
RBC # BLD AUTO: 3 10E12/L (ref 3.8–5.2)
RBC #/AREA URNS AUTO: 0 /HPF (ref 0–2)
SODIUM SERPL-SCNC: 134 MMOL/L (ref 133–144)
SOURCE: ABNORMAL
SP GR UR STRIP: 1 (ref 1–1.03)
SPECIMEN EXP DATE BLD: NORMAL
UROBILINOGEN UR STRIP-MCNC: 0 MG/DL (ref 0–2)
WBC # BLD AUTO: 9 10E9/L (ref 4–11)
WBC #/AREA URNS AUTO: 15 /HPF (ref 0–5)

## 2018-05-17 PROCEDURE — 81001 URINALYSIS AUTO W/SCOPE: CPT | Performed by: FAMILY MEDICINE

## 2018-05-17 PROCEDURE — 25000128 H RX IP 250 OP 636: Performed by: PHYSICIAN ASSISTANT

## 2018-05-17 PROCEDURE — 99285 EMERGENCY DEPT VISIT HI MDM: CPT | Mod: Z6 | Performed by: PHYSICIAN ASSISTANT

## 2018-05-17 PROCEDURE — 86901 BLOOD TYPING SEROLOGIC RH(D): CPT | Performed by: PHYSICIAN ASSISTANT

## 2018-05-17 PROCEDURE — 96374 THER/PROPH/DIAG INJ IV PUSH: CPT | Performed by: PHYSICIAN ASSISTANT

## 2018-05-17 PROCEDURE — 86850 RBC ANTIBODY SCREEN: CPT | Performed by: PHYSICIAN ASSISTANT

## 2018-05-17 PROCEDURE — 86900 BLOOD TYPING SEROLOGIC ABO: CPT | Performed by: PHYSICIAN ASSISTANT

## 2018-05-17 PROCEDURE — 85025 COMPLETE CBC W/AUTO DIFF WBC: CPT | Performed by: PHYSICIAN ASSISTANT

## 2018-05-17 PROCEDURE — 99284 EMERGENCY DEPT VISIT MOD MDM: CPT | Mod: 25 | Performed by: PHYSICIAN ASSISTANT

## 2018-05-17 PROCEDURE — 96372 THER/PROPH/DIAG INJ SC/IM: CPT

## 2018-05-17 PROCEDURE — 80053 COMPREHEN METABOLIC PANEL: CPT | Performed by: PHYSICIAN ASSISTANT

## 2018-05-17 PROCEDURE — 87086 URINE CULTURE/COLONY COUNT: CPT | Performed by: FAMILY MEDICINE

## 2018-05-17 RX ORDER — HYDROXYZINE HYDROCHLORIDE 50 MG/ML
12.5 INJECTION, SOLUTION INTRAMUSCULAR ONCE
Status: COMPLETED | OUTPATIENT
Start: 2018-05-17 | End: 2018-05-17

## 2018-05-17 RX ORDER — CETIRIZINE HYDROCHLORIDE 10 MG/1
5-10 TABLET ORAL DAILY PRN
Qty: 30 TABLET | Refills: 0 | Status: SHIPPED | OUTPATIENT
Start: 2018-05-17 | End: 2019-05-21

## 2018-05-17 RX ORDER — HYDROXYZINE HYDROCHLORIDE 25 MG/ML
12.5 INJECTION, SOLUTION INTRAMUSCULAR ONCE
Status: DISCONTINUED | OUTPATIENT
Start: 2018-05-17 | End: 2018-05-17

## 2018-05-17 RX ADMIN — HYDROXYZINE HYDROCHLORIDE 15 MG: 50 INJECTION, SOLUTION INTRAMUSCULAR at 01:08

## 2018-05-17 ASSESSMENT — ENCOUNTER SYMPTOMS
NAUSEA: 0
ABDOMINAL PAIN: 0
SHORTNESS OF BREATH: 1
VOMITING: 0
DIARRHEA: 1
CONFUSION: 1
FEVER: 0
ROS SKIN COMMENTS: ITCHY
LIGHT-HEADEDNESS: 1
WEAKNESS: 1

## 2018-05-17 NOTE — ED AVS SNAPSHOT
[facility] Emergency Department    [address]     [place] [address]    Phone:  [phone]    Fax:  [phone]                                       Nina Villalpando   MRN: [id number]    Department:  [facility] Emergency Department   Date of Visit:  5/17/2018           Patient Information     Date Of Birth          9/21/1930        Your diagnoses for this visit were:     Generalized pruritus     Lightheadedness     Weakness     Confusion     Urinary tract infection in female        You were seen by Gricelda Guy PA-C and Christiano Ziegler MD.      Follow-up Information     Follow up with Kyra Dangelo MD.    Specialty:  Family Practice    Contact information:    [address] [facility] [address]  [facility] [address]  [phone]          Discharge Instructions       Stay cool.  Heat will only make the itching worse.  Be sure to drink plenty of fluids, especially as it gets warmer outside.  You may use the Zyrtec as needed for itching.  This is less likely to cause confusion or drowsiness then antihistamine such as Benadryl.  Take the Keflex 250 mg 3 times a day to cover your bladder infection.  This could also be contributing to your confusion.  We will contact you if the urine culture dictates a change in your antibiotics.  Recheck in clinic in 1-2 weeks.  You should get back on some iron supplementation.  Ask the pharmacist if there is one that might be better tolerated for you.  It was a pleasure visiting with you and your daughter this evening.  I hope you feel better soon.    Thank you for choosing [facility]. We appreciate the opportunity to meet your urgent medical needs. Please let us know if we could have done anything to make your stay more satisfying.    After discharge, please closely monitor for any new or worsening symptoms. Return to the Emergency Department if you develop any acute worsening signs or symptoms.    If you had lab work,  cultures or imaging studies done during your stay, the final results may still be pending. We will call you if your plan of care needs to change. However, if you are not improving as expected, please follow up with your primary care provider or clinic.     Start any prescription medications that were prescribed to you and take them as directed.     Please see additional handouts that may be pertinent to your condition.          Your next 10 appointments already scheduled     May 17, 2018  2:20 PM CDT   Office Visit with NATALIA Bethea CNP   [facility]    [id number] [place] [address], [address]  [facility] [id number] [phone]           Bring a current list of meds and any records pertaining to this visit. For Physicals, please bring immunization records and any forms needing to be filled out. Please arrive 10 minutes early to complete paperwork.            Jun 08, 2018 11:20 AM CDT   [facility] Visit with [facility]ra [facility] MD   [facility]    [address] [place] [id number] [phone]           Bring a current list of meds and any records pertaining to this visit. For Physicals, please bring immunization records and any forms needing to be filled out. Please arrive 10 minutes early to complete paperwork.              24 Hour Appointment Hotline       To make an appointment at any [facility], call [phone] ([phone]). If you don't have a family doctor or clinic, we will help you find one. [facility] are conveniently located to serve the needs of you and your family.             Review of your medicines      START taking        Dose / Directions Last dose taken    cephalexin 250 MG capsule   Commonly known as:  KEFLEX   Dose:  250 mg   Quantity:  15 capsule        Take 1 capsule (250 mg) by mouth 3 times daily for 5 days   Refills:  0        cetirizine 10 MG tablet   Commonly known as:  zyrTEC    Dose:  5-10 mg   Quantity:  30 tablet        Take 0.5-1 tablets (5-10 mg) by mouth daily as needed for allergies (itching)   Refills:  0          Our records show that you are taking the medicines listed below. If these are incorrect, please call your family doctor or clinic.        Dose / Directions Last dose taken    aspirin 81 MG tablet   Dose:  1 tablet        Take 1 tablet by mouth daily.   Refills:  0        atorvastatin 20 MG tablet   Commonly known as:  LIPITOR   Dose:  20 mg   Quantity:  90 tablet        Take 1 tablet (20 mg) by mouth daily   Refills:  3        biotin 1000 MCG Tabs tablet   Dose:  1000 mcg        Take 1 tablet by mouth daily.   Refills:  0        Blood Pressure Monitor Kit   Dose:  1 Device   Quantity:  1 kit        1 Device daily Use to check your blood pressure every day. (Wrist monitor)   Refills:  0        cloNIDine 0.2 MG tablet   Commonly known as:  CATAPRES   Dose:  0.2 mg   Quantity:  180 tablet        Take 1 tablet (0.2 mg) by mouth 2 times daily   Refills:  3        COMPRESSION STOCKINGS   Quantity:  2 each        Lower extremities compression stockings Below the knees Mild pressure 15-20 MMHG   Refills:  0        Ferrous Gluconate 324 (37.5 Fe) MG Tabs   Dose:  324 mg        Take 324 mg by mouth daily   Refills:  0        Glucosamine-Chondroitin Tabs   Quantity:  0        1 tablet qd   Refills:  0        hydrALAZINE 50 MG tablet   Commonly known as:  APRESOLINE   Dose:  50 mg   Quantity:  270 tablet        Take 1 tablet (50 mg) by mouth 3 times daily   Refills:  1        metoprolol succinate 100 MG 24 hr tablet   Commonly known as:  TOPROL-XL   Quantity:  135 tablet        TAKE ONE & ONE-HALF TABLETS BY MOUTH ONCE DAILY AT BEDTIME   Refills:  3        MULTI-DAY Tabs        Refills:  0        order for DME   Quantity:  1 Device        Equipment being ordered: Blood pressure monitor   Refills:  0        vitamin D 1000 units capsule   Dose:  1 capsule        Take 1 capsule by  mouth daily Not taking every day   Refills:  0        vitamin E 400 UNIT capsule   Quantity:  0        1 qd   Refills:  0                Prescriptions were sent or printed at these locations (2 Prescriptions)                   [facility] - [place], [place] - [address]   [address] [facility] [address] [address]    Telephone:  [phone]   Fax:  [phone]   Hours:                  E-Prescribed (2 of 2)         cephalexin (KEFLEX) 250 MG capsule               cetirizine (ZYRTEC) 10 MG tablet                Procedures and tests performed during your visit     ABO/Rh type and screen    CBC with platelets differential    Comprehensive metabolic panel    Peripheral IV catheter    UA with Microscopic    Urine Culture Aerobic Bacterial      Orders Needing Specimen Collection     None      Pending Results     Date and Time Order Name Status Description    5/17/2018 0324 Urine Culture Aerobic Bacterial In process             Pending Culture Results     Date and Time Order Name Status Description    5/17/2018 0324 Urine Culture Aerobic Bacterial In process             Pending Results Instructions     If you had any lab results that were not finalized at the time of your Discharge, you can call the ED Lab Result RN at [phone]. You will be contacted by this team for any positive Lab results or changes in treatment. The nurses are available 7 days a week from 10A to 6:30P.  You can leave a message 24 hours per day and they will return your call.        Thank you for choosing [facility]       Thank you for choosing [facility] for your care. Our goal is always to provide you with excellent care. Hearing back from our patients is one way we can continue to improve our services. Please take a few minutes to complete the written survey that you may receive in the mail after you visit with us. Thank you!        [employer] Information     [employer] lets you send messages to your doctor, view your test results,  "renew your prescriptions, schedule appointments and more. To sign up, go to [email or web address] . Click on \"Log in\" on the left side of the screen, which will take you to the Welcome page. Then click on \"Sign up Now\" on the right side of the page.     You will be asked to enter the access code listed below, as well as some personal information. Please follow the directions to create your username and password.     Your access code is: [email or web address]  Expires: 2018 12:34 PM     Your access code will  in 90 days. If you need help or a new code, please call your [facility] clinic or [phone].        Care EveryWhere ID     This is your Care EveryWhere ID. This could be used by other organizations to access your [facility] medical records  [id number]        Equal Access to Services     LEISA HAN : Elayne Scott, evita todd, randee sykes, tim angeles . So [facility] [phone].    ATENCIÓN: Si habla español, tiene a reaves disposición servicios gratuitos de asistencia lingüística. Llame al [phone].    We comply with applicable federal civil rights laws and [place] laws. We do not discriminate on the basis of race, color, national origin, age, disability, sex, sexual orientation, or gender identity.            After Visit Summary       This is your record. Keep this with you and show to your community pharmacist(s) and doctor(s) at your next visit.                  "

## 2018-05-17 NOTE — DISCHARGE INSTRUCTIONS
Stay cool.  Heat will only make the itching worse.  Be sure to drink plenty of fluids, especially as it gets warmer outside.  You may use the Zyrtec as needed for itching.  This is less likely to cause confusion or drowsiness then antihistamine such as Benadryl.  Take the Keflex 250 mg 3 times a day to cover your bladder infection.  This could also be contributing to your confusion.  We will contact you if the urine culture dictates a change in your antibiotics.  Recheck in clinic in 1-2 weeks.  You should get back on some iron supplementation.  Ask the pharmacist if there is one that might be better tolerated for you.  It was a pleasure visiting with you and your daughter this evening.  I hope you feel better soon.    Thank you for choosing [facility]. We appreciate the opportunity to meet your urgent medical needs. Please let us know if we could have done anything to make your stay more satisfying.    After discharge, please closely monitor for any new or worsening symptoms. Return to the Emergency Department if you develop any acute worsening signs or symptoms.    If you had lab work, cultures or imaging studies done during your stay, the final results may still be pending. We will call you if your plan of care needs to change. However, if you are not improving as expected, please follow up with your primary care provider or clinic.     Start any prescription medications that were prescribed to you and take them as directed.     Please see additional handouts that may be pertinent to your condition.

## 2018-05-17 NOTE — ED AVS SNAPSHOT
[facility] Emergency Department    [address] [place] [address]    Phone:  [phone]    Fax:  [phone]                                       Nina Villalpando   MRN: [id number]    Department:  [facility] Emergency Department   Date of Visit:  5/17/2018           After Visit Summary Signature Page     I have received my discharge instructions, and my questions have been answered. I have discussed any challenges I see with this plan with the nurse or doctor.    ..........................................................................................................................................  Patient/Patient Representative Signature      ..........................................................................................................................................  Patient Representative Print Name and Relationship to Patient    ..................................................               ................................................  Date                                            Time    ..........................................................................................................................................  Reviewed by Signature/Title    ...................................................              ..............................................  Date                                                            Time

## 2018-05-17 NOTE — ED PROVIDER NOTES
History     Chief Complaint   Patient presents with     Pruritis     HPI  Nina Villalpando is a 87 year old female who presents to the emergency department complaining of itching.  The patient reports that the itchiness started last week, but the daughter reports that it started bothering the patient on Sunday night, 3 days ago.  The patient states her entire body is itchy.  She says her eyes feel swollen as well.  She does not have a rash.  Denies shortness of breath or chest pain but notes getting more short of breath with walking around for longer periods lately.  They had an appointment scheduled for tomorrow to discuss her itchiness but the patient's family is concerned because she is also more weak and confused from her baseline.  The patient has been complaining of lightheadedness as well and looks pale.  She was just hospitalized in the last couple weeks for a GI bleed and her symptoms seem to be coming back.  The patient states that she was given iron pills to take when she was discharged from the hospital but she quit taking them after 2 days because they gave her diarrhea.  She has not been running a fever, denies nausea, vomiting, or abdominal pain.  She tried Benadryl last night and this morning for the itchiness without relief.  Her daughter thinks it may be related to anxiety.  The patient lives alone in [place] and her daughter lives here in [place].        Problem List:    Patient Active Problem List    Diagnosis Date Noted     Hyperlipidemia, unspecified 01/22/2014     Priority: Medium     Diagnosis updated by automated process. Provider to review and confirm.       Aortic stenosis 11/01/2013     Priority: Medium     Health Care Home 12/28/2012     Priority: Medium     Oriana Yi RN-PHN  [facility]   [phone]    DX V65.8 REPLACED WITH 09195 HEALTH CARE HOME (04/08/2013)       CHAU (renal artery stenosis) (H) 09/13/2012     Priority: Medium     Hair loss  09/13/2012     Priority: Medium     Chronic rhinitis 04/17/2012     Priority: Medium     Essential hypertension 01/04/2012     Priority: Medium     Varicose veins of legs 11/29/2011     Priority: Medium     Advanced directives, counseling/discussion 10/27/2011     Priority: Medium     Advance Directive Problem List Overview:   Name Relationship Phone    Primary Health Care Agent            Alternative Health Care Agent          Discussed advance care planning with patient; however, patient declined at this time. 10/27/2011      3/21/17--Literature given to patient       Primary osteoarthritis of both knees      Priority: Medium        Past Medical History:    Past Medical History:   Diagnosis Date     Carpal tunnel syndrome      Diverticulitis of colon (without mention of hemorrhage)(562.11)      Generalized osteoarthrosis, unspecified site      Hernia of unspecified site of abdominal cavity without mention of obstruction or gangrene      Other and unspecified hyperlipidemia      Unspecified essential hypertension      Unspecified sinusitis (chronic)        Past Surgical History:    Past Surgical History:   Procedure Laterality Date     C NONSPECIFIC PROCEDURE  1980    Hysterectomy due to menometrorrhagia.  Bladder repair.     CATARACT IOL, RT/LT  6/26/2008    left eye     CATARACT IOL, RT/LT  7/24/2008    right eye     COLONOSCOPY  04/16/2007     HERNIA REPAIR, UMBILICAL  09/27/2007    Incarcerated.     LASER YAG CAPSULOTOMY  5/22/2014    Procedure: LASER YAG CAPSULOTOMY;  Surgeon: Hebert Ledbetter MD;  Location: [facility] OR       Family History:    Family History   Problem Relation Age of Onset     DIABETES Maternal Grandmother      C.A.D. Sister      HEART DISEASE Brother      DIABETES Mother      C.A.D. Father      HEART DISEASE Brother      HEART DISEASE Brother      HEART DISEASE Sister      Respiratory Sister      DIABETES Daughter      Anesthesia Reaction No family hx of      Asthma No family hx of       Hypertension No family hx of      Breast Cancer No family hx of      Alcohol/Drug No family hx of      Alzheimer Disease No family hx of      Prostate Cancer No family hx of      Cancer - colorectal No family hx of      CEREBROVASCULAR DISEASE No family hx of      Allergies No family hx of      Arthritis No family hx of      Cardiovascular No family hx of      Circulatory No family hx of      Depression No family hx of      Endocrine Disease No family hx of      Genetic Disorder No family hx of      GASTROINTESTINAL DISEASE No family hx of      Genitourinary Problems No family hx of      Blood Disease No family hx of      CANCER No family hx of      Congenital Anomalies No family hx of      Connective Tissue Disorder No family hx of      Eye Disorder No family hx of      Gynecology No family hx of      Obesity No family hx of      OSTEOPOROSIS No family hx of      Psychotic Disorder No family hx of      Musculoskeletal Disorder No family hx of      Lipids No family hx of      Neurologic Disorder No family hx of      Thyroid Disease No family hx of      Hearing Loss No family hx of        Social History:  Marital Status:   [5]  Social History   Substance Use Topics     Smoking status: Never Smoker     Smokeless tobacco: Never Used      Comment: no smokers in household     Alcohol use Yes      Comment: marcelo/waterx1-2/x1-2 q couple of months        Medications:      aspirin 81 MG tablet   atorvastatin (LIPITOR) 20 MG tablet   biotin 1000 MCG TABS tablet   Blood Pressure Monitor KIT   Cholecalciferol (VITAMIN D) 1000 UNIT capsule   cloNIDine (CATAPRES) 0.2 MG tablet   COMPRESSION STOCKINGS   Ferrous Gluconate 324 (37.5 Fe) MG TABS   GLUCOSAMINE-CHONDROITIN OR TABS   hydrALAZINE (APRESOLINE) 50 MG tablet   metoprolol (TOPROL-XL) 100 MG 24 hr tablet   MULTI-DAY OR TABS   order for DME   VITAMIN E 400 UNIT OR CAPS         Review of Systems   Constitutional: Negative for fever.   Respiratory: Positive for shortness  of breath (with exertion).    Cardiovascular: Negative for chest pain.   Gastrointestinal: Positive for diarrhea (earlier last week when taking iron pills). Negative for abdominal pain, nausea and vomiting.   Skin:        itchy   Neurological: Positive for weakness and light-headedness.   Psychiatric/Behavioral: Positive for confusion.   All other systems reviewed and are negative.      Physical Exam   BP: 200/85  Pulse: 86  Temp: 97.3  F (36.3  C)  Resp: 20  SpO2: 99 %      Physical Exam   Constitutional: She appears well-developed and well-nourished. No distress.   HENT:   Head: Normocephalic and atraumatic.   Mouth/Throat: Oropharynx is clear and moist.   Eyes: Conjunctivae and EOM are normal. Pupils are equal, round, and reactive to light. No scleral icterus.   No significant periorbital edema visualized   Neck: Neck supple.   Cardiovascular: Normal rate, regular rhythm and intact distal pulses.    Murmur (systolic) heard.  Pulmonary/Chest: Effort normal and breath sounds normal. No respiratory distress.   Abdominal: Soft. Bowel sounds are normal. There is no tenderness.   Musculoskeletal: She exhibits no edema or tenderness.   Neurological: She is alert.   Skin: Skin is warm and dry. No rash noted. She is not diaphoretic. There is pallor.   No discernible rash to plain patient's pruritus   Psychiatric:   Daughter provides some of the history.  Patient cannot recall some events.   Nursing note and vitals reviewed.      ED Course     ED Course     Procedures    No results found for this or any previous visit (from the past 24 hour(s)).    Medications   hydrOXYzine (VISTARIL) injection 15 mg (15 mg Intramuscular Given 5/17/18 0108)       Assessments & Plan (with Medical Decision Making)  Nina Villalpando is a 87 year old female presented to the ED complaining of pruritus that developed about 3 days ago.  She has an appointment for tomorrow in the clinic to discuss this but due to additional symptoms of  lightheadedness, weakness, and confusion over the last several days family decided to bring her in tonight.  On arrival to the ED blood pressure markedly elevated at 200/85.  Otherwise vital signs within normal limits.  She had no rash on exam to explain her itchiness, overall exam benign other than she appeared quite pale.  She was hospitalized from 5/3-5/8 for acute GI bleed and received blood transfusions during that hospital stay.  She was discharged on iron tablets but she quit taking them due to side effects unfortunately.  IV access obtained and labs drawn.  Intramuscular Vistaril given for her itchiness. I would like to check at least hemoglobin because I am worried that may have dropped further since she is not being compliant with iron supplementation.  She denies new GI symptoms to suggest recurrent bleed at this time.  Labs are currently pending.  Patient will be signed out to Dr. Ziegler at shift change.     I have reviewed the nursing notes.    I have reviewed the findings, diagnosis, plan and need for follow up with the patient.    New Prescriptions    No medications on file       Final diagnoses:   Generalized pruritus   Lightheadedness   Weakness   Confusion     Note: Chart documentation done in part with Dragon Voice Recognition software. Although reviewed after completion, some word and grammatical errors may remain.     5/17/2018   [facility] EMERGENCY DEPARTMENT     Gricelda Guy PA-C  05/17/18 0119

## 2018-05-17 NOTE — ED NOTES
Pt walked to BR with just standby assist. Urine sent and resulted. Pt states itching some. Alert and orientated.

## 2018-05-18 ENCOUNTER — TELEPHONE (OUTPATIENT)
Dept: EMERGENCY MEDICINE | Facility: CLINIC | Age: 83
End: 2018-05-18

## 2018-05-18 LAB
BACTERIA SPEC CULT: NO GROWTH
Lab: NORMAL
SPECIMEN SOURCE: NORMAL

## 2018-05-18 NOTE — TELEPHONE ENCOUNTER
"[facility] Emergency Department Lab result notification:    [facility] ED lab result protocol used  Urine Culture    Reason for call  Notify of lab results, assess symptoms,  review ED providers recommendations/discharge instructions (if necessary) and advise per ED lab result f/u protocol    Lab Result  Final urine culture report shows \"NO GROWTH\" and is NEGATIVE.  Emergency Dept discharge antibiotic: Cephalexin (Keflex) 500 mg capsule, 1 capsule (500 mg) by mouth 3 times daily for 5 days  Is ED discharge Rx antibiotic for UTI only (Yes/No): Yes  Recommendations per [facility] ED Lab result protocol - Urine culture protocol.    Information table from ED Provider visit on 5/17/18  Symptoms reported at ED visit (Chief complaint, HPI) Nina Villalpando is a 87 year old female who presents to the emergency department complaining of itching.  The patient reports that the itchiness started last week, but the daughter reports that it started bothering the patient on Sunday night, 3 days ago.  The patient states her entire body is itchy.  She says her eyes feel swollen as well.  She does not have a rash.  Denies shortness of breath or chest pain but notes getting more short of breath with walking around for longer periods lately.  They had an appointment scheduled for tomorrow to discuss her itchiness but the patient's family is concerned because she is also more weak and confused from her baseline.  The patient has been complaining of lightheadedness as well and looks pale.  She was just hospitalized in the last couple weeks for a GI bleed and her symptoms seem to be coming back.  The patient states that she was given iron pills to take when she was discharged from the hospital but she quit taking them after 2 days because they gave her diarrhea.  She has not been running a fever, denies nausea, vomiting, or abdominal pain.  She tried Benadryl last night and this morning for the itchiness without relief.  " Her daughter thinks it may be related to anxiety.  The patient lives alone in [place] and her daughter lives here in [place].   ED providers Impression and Plan (applicable information) Nina Villalpando is a 87 year old female presented to the ED complaining of pruritus that developed about 3 days ago.  She has an appointment for tomorrow in the clinic to discuss this but due to additional symptoms of lightheadedness, weakness, and confusion over the last several days family decided to bring her in tonight.  On arrival to the ED blood pressure markedly elevated at 200/85.  Otherwise vital signs within normal limits.  She had no rash on exam to explain her itchiness, overall exam benign other than she appeared quite pale.  She was hospitalized from 5/3-5/8 for acute GI bleed and received blood transfusions during that hospital stay.  She was discharged on iron tablets but she quit taking them due to side effects unfortunately.  IV access obtained and labs drawn.  Intramuscular Vistaril given for her itchiness. I would like to check at least hemoglobin because I am worried that may have dropped further since she is not being compliant with iron supplementation.  She denies new GI symptoms to suggest recurrent bleed at this time.  Labs are currently pending.  Patient will be signed out to Dr. Ziegler at shift change.   Miscellaneous information She was hospitalized down at [facility] after being evaluated here in the ED and found to have anemia with a hemoglobin of 7 due to a GI bleed.  She was hospitalized from May 3-8.  EGD was unremarkable.  Colonoscopy showed [place] diverticuli which may have been the source of her bleeding.  Apparently she was discharged with a hemoglobin of 7.4.  It is now up to 8.9 tonight.     I sat down and reviewed her results with the patient and her daughter.  She has been a little bit confused lately.  Could be attributed to the antihistamines that she has been using.  She used  some of her neighbors and also did take a dose of Benadryl.  We discussed checking a urine as an occult UTI can also cause confusion in the elderly.  She was able to produce a sample and she did show 15 WBCs in the urine along with trace leukocyte esterase.  Urine culture was sent.  I am going to cover her with Keflex and suggested Zyrtec rather than Benadryl for the itching.  The itching has improved after IM Vistaril ordered earlier.  Zyrtec is just a  version of Vistaril and will hopefully be better tolerated than the Benadryl.  We discussed staying cool and avoiding excessive heat.  She lives in a mobile home and it was 80  in her house yesterday.  She has not started her air conditioning yet.  Her daughter thinks she does not drink much during the day.  She wants to go back home.  They have discussed moving to an apartment in [place] so will be closer for her daughter to stop by on a daily basis.  She now lives in [place].  She is going to ultimately be the one that needs to make that decision.   I encouraged her to get back on some sort of iron supplementation to help her hemoglobin recover after her GI bleed.  Her hemoglobin is stable tonight.     RN Assessment (Patient s current Symptoms), include time called.  [Insert Left message here if message left]  No answer, no voicemail at 12:18 pm.      PCP follow-up Questions asked: YES       Krista Latham RN    [facility] Services RN  Lung Nodule and ED Lab Results F/U RN  Epic pool (ED late result f/u RN) : P [id number]   # [phone]    Copy of Lab result   Order   Urine Culture Aerobic Bacterial [[id number]] (Order [id number])   Exam Information   Exam Date Exam Time Accession # Results    5/17/18  2:55 AM     Component Results   Component Collected Lab   Specimen Description 05/17/2018  2:55    [facility]stream Urine   Special Requests 05/17/2018  2:55 AM 75   Specimen received in preservative   Culture Micro 05/17/2018  2:55    No  growth

## 2018-06-04 DIAGNOSIS — I10 HTN, GOAL BELOW 150/90: ICD-10-CM

## 2018-06-05 NOTE — PROGRESS NOTES
SUBJECTIVE:   Nina Villalpando is a 87 year old female who presents to clinic today for the following health issues:      HPI       ED/UC Followup:    Facility:  [facility]  Date of visit: 5/17/18  Reason for visit: Hemoglobin/ UTI  Current Status: Better Today       Problem list and histories reviewed & adjusted, as indicated.  Additional history: as documented        Patient Active Problem List   Diagnosis     Primary osteoarthritis of both knees     Advanced directives, counseling/discussion     Varicose veins of legs     Essential hypertension     Chronic rhinitis     CHAU (renal artery stenosis) (H)     Hair loss     Health Care Home     Aortic stenosis     Hyperlipidemia, unspecified     Past Surgical History:   Procedure Laterality Date     C NONSPECIFIC PROCEDURE  1980    Hysterectomy due to menometrorrhagia.  Bladder repair.     CATARACT IOL, RT/LT  6/26/2008    left eye     CATARACT IOL, RT/LT  7/24/2008    right eye     COLONOSCOPY  04/16/2007     HERNIA REPAIR, UMBILICAL  09/27/2007    Incarcerated.     LASER YAG CAPSULOTOMY  5/22/2014    Procedure: LASER YAG CAPSULOTOMY;  Surgeon: Hebert Ledbetter MD;  Location:  OR       Social History   Substance Use Topics     Smoking status: Never Smoker     Smokeless tobacco: Never Used      Comment: no smokers in household     Alcohol use Yes      Comment: marcelo/waterx1-2/x1-2 q couple of months     Family History   Problem Relation Age of Onset     Diabetes Maternal Grandmother      C.A.D. Sister      HEART DISEASE Brother      Diabetes Mother      C.A.D. Father      HEART DISEASE Brother      HEART DISEASE Brother      HEART DISEASE Sister      Respiratory Sister      Diabetes Daughter      Anesthesia Reaction No family hx of      Asthma No family hx of      Hypertension No family hx of      Breast Cancer No family hx of      Alcohol/Drug No family hx of      Alzheimer Disease No family hx of      Prostate Cancer No family hx of      Cancer  - colorectal No family hx of      Cerebrovascular Disease No family hx of      Allergies No family hx of      Arthritis No family hx of      Cardiovascular No family hx of      Circulatory No family hx of      Depression No family hx of      Endocrine Disease No family hx of      Genetic Disorder No family hx of      GASTROINTESTINAL DISEASE No family hx of      Genitourinary Problems No family hx of      Blood Disease No family hx of      Cancer No family hx of      Congenital Anomalies No family hx of      Connective Tissue Disorder No family hx of      Eye Disorder No family hx of      Gynecology No family hx of      Obesity No family hx of      Osteoperosis No family hx of      Psychotic Disorder No family hx of      Musculoskeletal Disorder No family hx of      Lipids No family hx of      Neurologic Disorder No family hx of      Thyroid Disease No family hx of      Hearing Loss No family hx of          Current Outpatient Prescriptions   Medication Sig Dispense Refill     aspirin 81 MG tablet Take 1 tablet by mouth daily.       atorvastatin (LIPITOR) 20 MG tablet Take 1 tablet (20 mg) by mouth daily 90 tablet 3     biotin 1000 MCG TABS tablet Take 1 tablet by mouth daily.       Blood Pressure Monitor KIT 1 Device daily Use to check your blood pressure every day. (Wrist monitor) 1 kit 0     cetirizine (ZYRTEC) 10 MG tablet Take 0.5-1 tablets (5-10 mg) by mouth daily as needed for allergies (itching) 30 tablet 0     Cholecalciferol (VITAMIN D) 1000 UNIT capsule Take 1 capsule by mouth daily Not taking every day       cloNIDine (CATAPRES) 0.2 MG tablet Take 1 tablet (0.2 mg) by mouth 2 times daily 180 tablet 3     COMPRESSION STOCKINGS Lower extremities compression stockings  Below the knees  Mild pressure 15-20 MMHG         2 each 0     GLUCOSAMINE-CHONDROITIN OR TABS 1 tablet qd 0 0     hydrALAZINE (APRESOLINE) 50 MG tablet Take 1 tablet (50 mg) by mouth 3 times daily 270 tablet 1     metoprolol succinate  "(TOPROL-XL) 100 MG 24 hr tablet TAKE ONE AND ONE-HALF TABLETS BY MOUTH AT BEDTIME 135 tablet 1     MULTI-DAY OR TABS   0     order for DME Equipment being ordered: Blood pressure monitor 1 Device 0     VITAMIN E 400 UNIT OR CAPS 1 qd  0 0     Allergies   Allergen Reactions     No Known Drug Allergies      BP Readings from Last 3 Encounters:   06/08/18 140/82   05/17/18 190/80   05/11/18 138/70    Wt Readings from Last 3 Encounters:   06/08/18 155 lb (70.3 kg)   05/11/18 157 lb (71.2 kg)   05/03/18 160 lb (72.6 kg)                  Labs reviewed in EPIC    ROS:  Constitutional, HEENT, cardiovascular, pulmonary, gi and gu systems are negative, except as otherwise noted.    OBJECTIVE:     /82 (BP Location: Left arm, Patient Position: Chair, Cuff Size: Adult Regular)  Pulse 78  Temp 98.1  F (36.7  C) (Temporal)  Resp 16  Ht 4' 11\" (1.499 m)  Wt 155 lb (70.3 kg)  SpO2 98%  BMI 31.31 kg/m2  Body mass index is 31.31 kg/(m^2).   Physical Exam   Constitutional: She is oriented to person, place, and time. She appears well-developed and well-nourished.   HENT:   Head: Normocephalic and atraumatic.   Right Ear: External ear normal.   Left Ear: External ear normal.   Mouth/Throat: Oropharynx is clear and moist.   Eyes: EOM are normal.   Neck: Neck supple.   Cardiovascular: Normal rate and regular rhythm.    Murmur heard.  Pulmonary/Chest: Effort normal and breath sounds normal.   Abdominal: Soft. Bowel sounds are normal.   Musculoskeletal: Normal range of motion.   Neurological: She is alert and oriented to person, place, and time.   Psychiatric: She has a normal mood and affect.         Diagnostic Test Results:  none     ASSESSMENT/PLAN:     Problem List Items Addressed This Visit     None      Visit Diagnoses     Anemia, unspecified type    -  Primary       This is a pleasant 87-year-old with history of hypertension, aortic stenosis, hyperlipidemia who is here for post ED follow up . Briefly she had an episode of " melena with significant drop in her hemoglobin in early part of may which was thought to be due to a diverticulitis episode. She subsequently was seen in the emergency in mid may for itching and seemed to be confused and was diagnosed with UTI. She has resumed her iron supplementation. Her symptoms are resolved today. Advised to continue iron supplementation. Recheck in 3 months for follow up        Kyra Dangelo MD  [facility]

## 2018-06-06 NOTE — TELEPHONE ENCOUNTER
"Requested Prescriptions   Pending Prescriptions Disp Refills     metoprolol succinate (TOPROL-XL) 100 MG 24 hr tablet [Pharmacy Med Name: METOPROLOL ER 100MG TAB] 135 tablet 3     Sig: TAKE ONE AND ONE-HALF TABLETS BY MOUTH AT BEDTIME    Beta-Blockers Protocol Failed    6/4/2018  1:07 PM       Failed - Blood pressure under 140/90 in past 12 months    BP Readings from Last 3 Encounters:   05/17/18 190/80   05/11/18 138/70   05/03/18 161/74                Passed - Patient is age 6 or older       Passed - Recent (12 mo) or future (30 days) visit within the authorizing provider's specialty    Patient had office visit in the last 12 months or has a visit in the next 30 days with authorizing provider or within the authorizing provider's specialty.  See \"Patient Info\" tab in inbasket, or \"Choose Columns\" in Meds & Orders section of the refill encounter.            Routing refill request to provider for review/approval because:  BP not at goal        "

## 2018-06-08 ENCOUNTER — OFFICE VISIT (OUTPATIENT)
Dept: FAMILY MEDICINE | Facility: OTHER | Age: 83
End: 2018-06-08
Payer: COMMERCIAL

## 2018-06-08 VITALS
BODY MASS INDEX: 31.25 KG/M2 | RESPIRATION RATE: 16 BRPM | OXYGEN SATURATION: 98 % | SYSTOLIC BLOOD PRESSURE: 140 MMHG | TEMPERATURE: 98.1 F | WEIGHT: 155 LBS | DIASTOLIC BLOOD PRESSURE: 82 MMHG | HEIGHT: 59 IN | HEART RATE: 78 BPM

## 2018-06-08 DIAGNOSIS — D64.9 ANEMIA, UNSPECIFIED TYPE: Primary | ICD-10-CM

## 2018-06-08 PROCEDURE — 99213 OFFICE O/P EST LOW 20 MIN: CPT | Performed by: FAMILY MEDICINE

## 2018-06-08 RX ORDER — METOPROLOL SUCCINATE 100 MG/1
TABLET, EXTENDED RELEASE ORAL
Qty: 135 TABLET | Refills: 1 | Status: SHIPPED | OUTPATIENT
Start: 2018-06-08 | End: 2018-11-26

## 2018-06-08 ASSESSMENT — PAIN SCALES - GENERAL: PAINLEVEL: NO PAIN (0)

## 2018-06-08 NOTE — MR AVS SNAPSHOT
"              After Visit Summary   6/8/2018    Nina Villalpando    MRN: [id number]           Patient Information     Date Of Birth          9/21/1930        Visit Information        Provider Department      6/8/2018 11:20 AM Kyra Dangelo MD [facility]         Follow-ups after your visit        Follow-up notes from your care team     Return in about 3 months (around 9/8/2018).      Who to contact     If you have questions or need follow up information about today's clinic visit or your schedule please contact [facility] directly at [phone].  Normal or non-critical lab and imaging results will be communicated to you by MyChart, letter or phone within 4 business days after the clinic has received the results. If you do not hear from us within 7 days, please contact the clinic through MyChart or phone. If you have a critical or abnormal lab result, we will notify you by phone as soon as possible.  Submit refill requests through [employer] or call your pharmacy and they will forward the refill request to us. Please allow 3 business days for your refill to be completed.          Additional Information About Your Visit        Care EveryWhere ID     This is your Care EveryWhere ID. This could be used by other organizations to access your [place] medical records  [id number]        Your Vitals Were     Pulse Temperature Respirations Height Pulse Oximetry BMI (Body Mass Index)    78 98.1  F (36.7  C) (Temporal) 16 4' 11\" (1.499 m) 98% 31.31 kg/m2       Blood Pressure from Last 3 Encounters:   06/08/18 140/82   05/17/18 190/80   05/11/18 138/70    Weight from Last 3 Encounters:   06/08/18 155 lb (70.3 kg)   05/11/18 157 lb (71.2 kg)   05/03/18 160 lb (72.6 kg)              Today, you had the following     No orders found for display       Primary Care Provider Office Phone # Fax #    Kyra Dangelo [phone]       [address] [facility] [address]  [facility] [address]      "   Equal Access to Services     [facility] : Hadii aad ku hadhollistayler Somichaelali, waaxda luqadaha, qaybta kaalmadani sykes, tim brothers. So [facility] [phone].    ATENCIÓN: Si habla español, tiene a reaves disposición servicios gratuitos de asistencia lingüística. Gregame al [phone].    We comply with applicable federal civil rights laws and [place] laws. We do not discriminate on the basis of race, color, national origin, age, disability, sex, sexual orientation, or gender identity.            Thank you!     Thank you for choosing [facility]  for your care. Our goal is always to provide you with excellent care. Hearing back from our patients is one way we can continue to improve our services. Please take a few minutes to complete the written survey that you may receive in the mail after your visit with us. Thank you!             Your Updated Medication List - Protect others around you: Learn how to safely use, store and throw away your medicines at www.disposemymeds.org.          This list is accurate as of 6/8/18 11:38 AM.  Always use your most recent med list.                   Brand Name Dispense Instructions for use Diagnosis    aspirin 81 MG tablet      Take 1 tablet by mouth daily.        atorvastatin 20 MG tablet    LIPITOR    90 tablet    Take 1 tablet (20 mg) by mouth daily    Hyperlipidemia, unspecified       biotin 1000 MCG Tabs tablet      Take 1 tablet by mouth daily.        Blood Pressure Monitor Kit     1 kit    1 Device daily Use to check your blood pressure every day. (Wrist monitor)    Benign essential hypertension       cetirizine 10 MG tablet    zyrTEC    30 tablet    Take 0.5-1 tablets (5-10 mg) by mouth daily as needed for allergies (itching)    Generalized pruritus       cloNIDine 0.2 MG tablet    CATAPRES    180 tablet    Take 1 tablet (0.2 mg) by mouth 2 times daily    HTN, goal below 150/90       COMPRESSION STOCKINGS     2 each    Lower  extremities compression stockings Below the knees Mild pressure 15-20 MMHG    Varicose veins of legs       Glucosamine-Chondroitin Tabs     0    1 tablet qd        hydrALAZINE 50 MG tablet    APRESOLINE    270 tablet    Take 1 tablet (50 mg) by mouth 3 times daily    HTN, goal below 150/90       metoprolol succinate 100 MG 24 hr tablet    TOPROL-XL    135 tablet    TAKE ONE & ONE-HALF TABLETS BY MOUTH ONCE DAILY AT BEDTIME    HTN, goal below 150/90       MULTI-DAY Tabs           order for DME     1 Device    Equipment being ordered: Blood pressure monitor    HTN, goal below 150/90       vitamin D 1000 units capsule      Take 1 capsule by mouth daily Not taking every day        vitamin E 400 UNIT capsule     0    1 qd

## 2018-08-14 NOTE — PROGRESS NOTES
SUBJECTIVE:   Nina Villalpando is a 87 year old female who presents to clinic today for the following health issues:      HPI    Patient here today to follow up for Anemia from May.  Feeling fine besides some joint pain in the shoulder/knee.  Problem list and histories reviewed & adjusted, as indicated.  Additional history: as documented        Patient Active Problem List   Diagnosis     Primary osteoarthritis of both knees     Anemia, unspecified type     Advanced directives, counseling/discussion     Varicose veins of legs     Essential hypertension     Chronic rhinitis     CHAU (renal artery stenosis) (H)     Hair loss     Health Care Home     Aortic stenosis     Hyperlipidemia, unspecified     Past Surgical History:   Procedure Laterality Date     C NONSPECIFIC PROCEDURE  1980    Hysterectomy due to menometrorrhagia.  Bladder repair.     CATARACT IOL, RT/LT  6/26/2008    left eye     CATARACT IOL, RT/LT  7/24/2008    right eye     COLONOSCOPY  04/16/2007     HERNIA REPAIR, UMBILICAL  09/27/2007    Incarcerated.     LASER YAG CAPSULOTOMY  5/22/2014    Procedure: LASER YAG CAPSULOTOMY;  Surgeon: Hebert Ledbetter MD;  Location:  OR       Social History   Substance Use Topics     Smoking status: Never Smoker     Smokeless tobacco: Never Used      Comment: no smokers in household     Alcohol use Yes      Comment: marcelo/waterx1-2/x1-2 q couple of months     Family History   Problem Relation Age of Onset     Diabetes Maternal Grandmother      C.A.D. Sister      HEART DISEASE Brother      Diabetes Mother      C.A.D. Father      HEART DISEASE Brother      HEART DISEASE Brother      HEART DISEASE Sister      Respiratory Sister      Diabetes Daughter      Anesthesia Reaction No family hx of      Asthma No family hx of      Hypertension No family hx of      Breast Cancer No family hx of      Alcohol/Drug No family hx of      Alzheimer Disease No family hx of      Prostate Cancer No family hx of      Cancer -  colorectal No family hx of      Cerebrovascular Disease No family hx of      Allergies No family hx of      Arthritis No family hx of      Cardiovascular No family hx of      Circulatory No family hx of      Depression No family hx of      Endocrine Disease No family hx of      Genetic Disorder No family hx of      GASTROINTESTINAL DISEASE No family hx of      Genitourinary Problems No family hx of      Blood Disease No family hx of      Cancer No family hx of      Congenital Anomalies No family hx of      Connective Tissue Disorder No family hx of      Eye Disorder No family hx of      Gynecology No family hx of      Obesity No family hx of      Osteoperosis No family hx of      Psychotic Disorder No family hx of      Musculoskeletal Disorder No family hx of      Lipids No family hx of      Neurologic Disorder No family hx of      Thyroid Disease No family hx of      Hearing Loss No family hx of          Current Outpatient Prescriptions   Medication Sig Dispense Refill     aspirin 81 MG tablet Take 1 tablet by mouth daily.       atorvastatin (LIPITOR) 20 MG tablet Take 1 tablet (20 mg) by mouth daily 90 tablet 3     biotin 1000 MCG TABS tablet Take 1 tablet by mouth daily.       Blood Pressure Monitor KIT 1 Device daily Use to check your blood pressure every day. (Wrist monitor) 1 kit 0     cetirizine (ZYRTEC) 10 MG tablet Take 0.5-1 tablets (5-10 mg) by mouth daily as needed for allergies (itching) 30 tablet 0     Cholecalciferol (VITAMIN D) 1000 UNIT capsule Take 1 capsule by mouth daily Not taking every day       cloNIDine (CATAPRES) 0.2 MG tablet Take 1 tablet (0.2 mg) by mouth 2 times daily 180 tablet 3     COMPRESSION STOCKINGS Lower extremities compression stockings  Below the knees  Mild pressure 15-20 MMHG         2 each 0     GLUCOSAMINE-CHONDROITIN OR TABS 1 tablet qd 0 0     hydrALAZINE (APRESOLINE) 50 MG tablet Take 1 tablet (50 mg) by mouth 3 times daily 270 tablet 1     metoprolol succinate  "(TOPROL-XL) 100 MG 24 hr tablet TAKE ONE AND ONE-HALF TABLETS BY MOUTH AT BEDTIME 135 tablet 1     MULTI-DAY OR TABS   0     order for DME Equipment being ordered: Blood pressure monitor 1 Device 0     VITAMIN E 400 UNIT OR CAPS 1 qd  0 0     Allergies   Allergen Reactions     No Known Drug Allergies      BP Readings from Last 3 Encounters:   08/21/18 154/72   06/08/18 140/82   05/17/18 190/80    Wt Readings from Last 3 Encounters:   08/21/18 160 lb (72.6 kg)   06/08/18 155 lb (70.3 kg)   05/11/18 157 lb (71.2 kg)                  Labs reviewed in EPIC    ROS:  Constitutional, HEENT, cardiovascular, pulmonary, gi and gu systems are negative, except as otherwise noted.    OBJECTIVE:     /72 (BP Location: Left arm, Patient Position: Chair, Cuff Size: Adult Regular)  Pulse 77  Temp 97.6  F (36.4  C) (Temporal)  Resp 16  Ht 4' 11\" (1.499 m)  Wt 160 lb (72.6 kg)  SpO2 96%  BMI 32.32 kg/m2  Body mass index is 32.32 kg/(m^2).   Physical Exam   Constitutional: She is oriented to person, place, and time. She appears well-developed and well-nourished.   HENT:   Head: Normocephalic and atraumatic.   Right Ear: External ear normal.   Left Ear: External ear normal.   Nose: Nose normal.   Mouth/Throat: No oropharyngeal exudate.   Eyes: EOM are normal.   Neck: Neck supple.   Cardiovascular: Normal rate, regular rhythm, normal heart sounds and intact distal pulses.  Exam reveals no gallop.    No murmur heard.  Pulmonary/Chest: Effort normal and breath sounds normal.   Neurological: She is alert and oriented to person, place, and time.   Psychiatric: She has a normal mood and affect.         Diagnostic Test Results:  none     ASSESSMENT/PLAN:     Problem List Items Addressed This Visit     Anemia, unspecified type - Primary     Patient with recent GI bleed causing anemia.  Patient has not been taking her iron supplementation regularly will recheck hemoglobin levels to look for stability.  If having intolerance to the " pills advised her to change her nutrition.  Daughter reports that she lives alone in an independent facility and does not pay good attention to her nutrition.  Will put in a care coordination referral to see if there are any resources available from the [facility] to provide better nutrition         Relevant Orders    CBC with platelets and differential (Completed)    CARE COORDINATION REFERRAL    Aortic stenosis     Patient with history of moderate aortic stenosis.  Currently asymptomatic.  Given her age and lack of symptoms will continue to monitor this with conservative management.  Will refer to cardiology if there are any change           Other Visit Diagnoses     Benign essential hypertension        Relevant Orders    Basic metabolic panel (Completed)    CARE COORDINATION REFERRAL             Kyra Dangelo MD  [facility]

## 2018-08-21 ENCOUNTER — OFFICE VISIT (OUTPATIENT)
Dept: FAMILY MEDICINE | Facility: OTHER | Age: 83
End: 2018-08-21
Payer: COMMERCIAL

## 2018-08-21 VITALS
HEART RATE: 77 BPM | HEIGHT: 59 IN | OXYGEN SATURATION: 96 % | RESPIRATION RATE: 16 BRPM | SYSTOLIC BLOOD PRESSURE: 154 MMHG | TEMPERATURE: 97.6 F | BODY MASS INDEX: 32.25 KG/M2 | DIASTOLIC BLOOD PRESSURE: 72 MMHG | WEIGHT: 160 LBS

## 2018-08-21 DIAGNOSIS — I10 BENIGN ESSENTIAL HYPERTENSION: ICD-10-CM

## 2018-08-21 DIAGNOSIS — D64.9 ANEMIA, UNSPECIFIED TYPE: Primary | ICD-10-CM

## 2018-08-21 DIAGNOSIS — I35.0 AORTIC VALVE STENOSIS, ETIOLOGY OF CARDIAC VALVE DISEASE UNSPECIFIED: ICD-10-CM

## 2018-08-21 LAB
ANION GAP SERPL CALCULATED.3IONS-SCNC: 7 MMOL/L (ref 3–14)
BASOPHILS # BLD AUTO: 0.1 10E9/L (ref 0–0.2)
BASOPHILS NFR BLD AUTO: 1.2 %
BUN SERPL-MCNC: 20 MG/DL (ref 7–30)
CALCIUM SERPL-MCNC: 8.8 MG/DL (ref 8.5–10.1)
CHLORIDE SERPL-SCNC: 102 MMOL/L (ref 94–109)
CO2 SERPL-SCNC: 28 MMOL/L (ref 20–32)
CREAT SERPL-MCNC: 1.01 MG/DL (ref 0.52–1.04)
DIFFERENTIAL METHOD BLD: ABNORMAL
EOSINOPHIL # BLD AUTO: 0.1 10E9/L (ref 0–0.7)
EOSINOPHIL NFR BLD AUTO: 1.3 %
ERYTHROCYTE [DISTWIDTH] IN BLOOD BY AUTOMATED COUNT: 16.8 % (ref 10–15)
GFR SERPL CREATININE-BSD FRML MDRD: 52 ML/MIN/1.7M2
GLUCOSE SERPL-MCNC: 106 MG/DL (ref 70–99)
HCT VFR BLD AUTO: 29.7 % (ref 35–47)
HGB BLD-MCNC: 9.4 G/DL (ref 11.7–15.7)
LYMPHOCYTES # BLD AUTO: 1.6 10E9/L (ref 0.8–5.3)
LYMPHOCYTES NFR BLD AUTO: 18.9 %
MCH RBC QN AUTO: 24.8 PG (ref 26.5–33)
MCHC RBC AUTO-ENTMCNC: 31.6 G/DL (ref 31.5–36.5)
MCV RBC AUTO: 78 FL (ref 78–100)
MONOCYTES # BLD AUTO: 0.7 10E9/L (ref 0–1.3)
MONOCYTES NFR BLD AUTO: 7.9 %
NEUTROPHILS # BLD AUTO: 5.9 10E9/L (ref 1.6–8.3)
NEUTROPHILS NFR BLD AUTO: 70.7 %
PLATELET # BLD AUTO: 370 10E9/L (ref 150–450)
POTASSIUM SERPL-SCNC: 4.3 MMOL/L (ref 3.4–5.3)
RBC # BLD AUTO: 3.79 10E12/L (ref 3.8–5.2)
SODIUM SERPL-SCNC: 137 MMOL/L (ref 133–144)
WBC # BLD AUTO: 8.3 10E9/L (ref 4–11)

## 2018-08-21 PROCEDURE — 99214 OFFICE O/P EST MOD 30 MIN: CPT | Performed by: FAMILY MEDICINE

## 2018-08-21 PROCEDURE — 80048 BASIC METABOLIC PNL TOTAL CA: CPT | Performed by: FAMILY MEDICINE

## 2018-08-21 PROCEDURE — 36415 COLL VENOUS BLD VENIPUNCTURE: CPT | Performed by: FAMILY MEDICINE

## 2018-08-21 PROCEDURE — 85025 COMPLETE CBC W/AUTO DIFF WBC: CPT | Performed by: FAMILY MEDICINE

## 2018-08-21 ASSESSMENT — PAIN SCALES - GENERAL: PAINLEVEL: NO PAIN (0)

## 2018-08-21 NOTE — MR AVS SNAPSHOT
"              After Visit Summary   8/21/2018    Nina Villalpando    MRN: [id number]           Patient Information     Date Of Birth          9/21/1930        Visit Information        Provider Department      8/21/2018 11:00 AM Kyra Dangelo MD [facility] [place] [facility]        Today's Diagnoses     Anemia, unspecified type    -  1    Benign essential hypertension           Follow-ups after your visit        Follow-up notes from your care team     Return in about 3 months (around 11/21/2018).      Who to contact     If you have questions or need follow up information about today's clinic visit or your schedule please contact [facility] directly at [phone].  Normal or non-critical lab and imaging results will be communicated to you by MyChart, letter or phone within 4 business days after the clinic has received the results. If you do not hear from us within 7 days, please contact the clinic through MyChart or phone. If you have a critical or abnormal lab result, we will notify you by phone as soon as possible.  Submit refill requests through [email or web address] or call your pharmacy and they will forward the refill request to us. Please allow 3 business days for your refill to be completed.          Additional Information About Your Visit        Care EveryWhere ID     This is your Care EveryWhere ID. This could be used by other organizations to access your [place] medical records  [id number]        Your Vitals Were     Pulse Temperature Respirations Height Pulse Oximetry BMI (Body Mass Index)    77 97.6  F (36.4  C) (Temporal) 16 4' 11\" (1.499 m) 96% 32.32 kg/m2       Blood Pressure from Last 3 Encounters:   08/21/18 154/72   06/08/18 140/82   05/17/18 190/80    Weight from Last 3 Encounters:   08/21/18 160 lb (72.6 kg)   06/08/18 155 lb (70.3 kg)   05/11/18 157 lb (71.2 kg)              We Performed the Following     Basic metabolic panel     CBC with platelets and differential        Primary Care " Provider Office Phone # Fax #    Kyra Dangelo [phone]       [address] [place] [address] [address]  [facility] [address]        Equal Access to Services     LEISA HAN : Hadii aad ku hadhollistayler Scott, waalcirada eduardoheberha, qaobita kadhavalda mony, tim bhavikin hayaajordana wilkinsonmikewendy brothers. So [facility] [phone].    ATENCIÓN: Si habla español, tiene a reaves disposición servicios gratuitos de asistencia lingüística. Llame al [phone].    We comply with applicable federal civil rights laws and [place] laws. We do not discriminate on the basis of race, color, national origin, age, disability, sex, sexual orientation, or gender identity.            Thank you!     Thank you for choosing [facility]  for your care. Our goal is always to provide you with excellent care. Hearing back from our patients is one way we can continue to improve our services. Please take a few minutes to complete the written survey that you may receive in the mail after your visit with us. Thank you!             Your Updated Medication List - Protect others around you: Learn how to safely use, store and throw away your medicines at www.disposemymeds.org.          This list is accurate as of 8/21/18 11:47 AM.  Always use your most recent med list.                   Brand Name Dispense Instructions for use Diagnosis    aspirin 81 MG tablet      Take 1 tablet by mouth daily.        atorvastatin 20 MG tablet    LIPITOR    90 tablet    Take 1 tablet (20 mg) by mouth daily    Hyperlipidemia, unspecified       biotin 1000 MCG Tabs tablet      Take 1 tablet by mouth daily.        Blood Pressure Monitor Kit     1 kit    1 Device daily Use to check your blood pressure every day. (Wrist monitor)    Benign essential hypertension       cetirizine 10 MG tablet    zyrTEC    30 tablet    Take 0.5-1 tablets (5-10 mg) by mouth daily as needed for allergies (itching)    Generalized pruritus       cloNIDine 0.2 MG tablet    CATAPRES    180  tablet    Take 1 tablet (0.2 mg) by mouth 2 times daily    HTN, goal below 150/90       COMPRESSION STOCKINGS     2 each    Lower extremities compression stockings Below the knees Mild pressure 15-20 MMHG    Varicose veins of legs       Glucosamine-Chondroitin Tabs     0    1 tablet qd        hydrALAZINE 50 MG tablet    APRESOLINE    270 tablet    Take 1 tablet (50 mg) by mouth 3 times daily    HTN, goal below 150/90       metoprolol succinate 100 MG 24 hr tablet    TOPROL-XL    135 tablet    TAKE ONE AND ONE-HALF TABLETS BY MOUTH AT BEDTIME    HTN, goal below 150/90       MULTI-DAY Tabs           order for DME     1 Device    Equipment being ordered: Blood pressure monitor    HTN, goal below 150/90       vitamin D 1000 units capsule      Take 1 capsule by mouth daily Not taking every day        vitamin E 400 UNIT capsule     0    1 qd

## 2018-08-23 NOTE — ASSESSMENT & PLAN NOTE
Patient with history of moderate aortic stenosis.  Currently asymptomatic.  Given her age and lack of symptoms will continue to monitor this with conservative management.  Will refer to cardiology if there are any change

## 2018-08-23 NOTE — ASSESSMENT & PLAN NOTE
Patient with recent GI bleed causing anemia.  Patient has not been taking her iron supplementation regularly will recheck hemoglobin levels to look for stability.  If having intolerance to the pills advised her to change her nutrition.  Daughter reports that she lives alone in an independent facility and does not pay good attention to her nutrition.  Will put in a care coordination referral to see if there are any resources available from the [facility] to provide better nutrition

## 2018-08-27 ENCOUNTER — PATIENT OUTREACH (OUTPATIENT)
Dept: CARE COORDINATION | Facility: CLINIC | Age: 83
End: 2018-08-27

## 2018-08-27 NOTE — PROGRESS NOTES
Clinic Care Coordination Contact    Clinic Care Coordination Contact  OUTREACH    Referral Information:  Referral Source: PCP    Primary Diagnosis: Psychosocial    Chief Complaint   Patient presents with     Clinic Care Coordination - Initial     SW        Universal Utilization:   Clinic Utilization  Difficulty keeping appointments:: No  Utilization    Last refreshed: 8/23/2018  9:03 AM:  No Show Count (past year) 0       Last refreshed: 8/23/2018  9:03 AM:  ED visits 2       Last refreshed: 8/23/2018  9:03 AM:  Hospital admissions 0          Current as of: 8/23/2018  9:03 AM           Clinical Concerns:  Patient Active Problem List   Diagnosis     Primary osteoarthritis of both knees     Anemia, unspecified type     Advanced directives, counseling/discussion     Varicose veins of legs     Essential hypertension     Chronic rhinitis     CHAU (renal artery stenosis) (H)     Hair loss     Health Care Home     Aortic stenosis     Hyperlipidemia, unspecified       Current Medical Concerns:  Referral for CC to assist pt with food resources. Spoke with daughter Shelby and she would like to look into Meals on Wheels for her mom/the pt.     Current Behavioral Concerns: n/a    Education Provided to patient: gave her website for Moms Meals ([email or web address]) and Meals on Wheels ([email or web address]) phone number [phone]      Health Maintenance Reviewed:    Clinical Pathway: None    Medication Management:  See medication list     Functional Status:  Bed or wheelchair confined:: No  Mobility Status: Independent    Living Situation:  Current living arrangement:: I live in a private home  Type of residence:: Private home - no stairs    Diet/Exercise/Sleep:  Inadequate nutrition (GOAL):: Yes    Transportation:  Transportation concerns (GOAL):: No  Transportation means:: Family, Regular car     Psychosocial:  Informal Support system:: Family, Children     Financial/Insurance:   Financial/Insurance concerns (GOAL)::  No  [employer]     Resources and Interventions:  Current Resources:      Referrals Placed: Meals on Wheels     Goals: n/a      Patient/Caregiver understanding: yes       Future Appointments              In 2 months Kyra Dangelo MD [facility]          Plan: Resources given for Moms Meals and Meals on Wheels. Dgtr Shelyb declines any other CC needs at this time. SW CC will do no further outreaches.     BRAND[facility] Care Coordinator  [place] [facility]  8/27/2018 12:28 PM  [phone]

## 2018-08-28 ENCOUNTER — TRANSFERRED RECORDS (OUTPATIENT)
Dept: HEALTH INFORMATION MANAGEMENT | Facility: CLINIC | Age: 83
End: 2018-08-28

## 2018-09-25 DIAGNOSIS — I10 HTN, GOAL BELOW 150/90: ICD-10-CM

## 2018-09-26 NOTE — TELEPHONE ENCOUNTER
Clonodine & Hydralazine  Routing refill request to provider for review/approval because:  Labs out of range:  BP consistently elevated    Next 5 appointments (look out 90 days)     Nov 20, 2018 11:00 AM CST   [facility]    [address] [address]   [phone]                Jannie De Leon RN, BSN

## 2018-09-27 RX ORDER — CLONIDINE HYDROCHLORIDE 0.2 MG/1
TABLET ORAL
Qty: 180 TABLET | Refills: 3 | Status: SHIPPED | OUTPATIENT
Start: 2018-09-27 | End: 2019-10-22

## 2018-09-27 RX ORDER — HYDRALAZINE HYDROCHLORIDE 50 MG/1
TABLET, FILM COATED ORAL
Qty: 270 TABLET | Refills: 1 | Status: SHIPPED | OUTPATIENT
Start: 2018-09-27 | End: 2019-05-21

## 2018-11-20 NOTE — PROGRESS NOTES
SUBJECTIVE:   Nina Villalpando is a 88 year old female who presents to clinic today for the following health issues:      HPI    Patient here today to follow up for Anemia from May.  Feeling fine besides some joint pain in the shoulder/knees.  Patient's daughter present and concerned with more forgetfulness issues.       Problem list and histories reviewed & adjusted, as indicated.  Additional history: as documented        Patient Active Problem List   Diagnosis     Primary osteoarthritis of both knees     Anemia, unspecified type     Advanced directives, counseling/discussion     Varicose veins of legs     Essential hypertension     Chronic rhinitis     CHAU (renal artery stenosis) (H)     Hair loss     Health Care Home     Aortic stenosis     Hyperlipidemia, unspecified     Past Surgical History:   Procedure Laterality Date     C NONSPECIFIC PROCEDURE  1980    Hysterectomy due to menometrorrhagia.  Bladder repair.     CATARACT IOL, RT/LT  6/26/2008    left eye     CATARACT IOL, RT/LT  7/24/2008    right eye     COLONOSCOPY  04/16/2007     HERNIA REPAIR, UMBILICAL  09/27/2007    Incarcerated.     LASER YAG CAPSULOTOMY  5/22/2014    Procedure: LASER YAG CAPSULOTOMY;  Surgeon: Hebert Ledbetter MD;  Location:  OR       Social History   Substance Use Topics     Smoking status: Never Smoker     Smokeless tobacco: Never Used      Comment: no smokers in household     Alcohol use Yes      Comment: marcelo/waterx1-2/x1-2 q couple of months     Family History   Problem Relation Age of Onset     Diabetes Maternal Grandmother      C.A.D. Sister      HEART DISEASE Brother      Diabetes Mother      C.A.D. Father      HEART DISEASE Brother      HEART DISEASE Brother      HEART DISEASE Sister      Respiratory Sister      Diabetes Daughter      Anesthesia Reaction No family hx of      Asthma No family hx of      Hypertension No family hx of      Breast Cancer No family hx of      Alcohol/Drug No family hx of       Alzheimer Disease No family hx of      Prostate Cancer No family hx of      Cancer - colorectal No family hx of      Cerebrovascular Disease No family hx of      Allergies No family hx of      Arthritis No family hx of      Cardiovascular No family hx of      Circulatory No family hx of      Depression No family hx of      Endocrine Disease No family hx of      Genetic Disorder No family hx of      GASTROINTESTINAL DISEASE No family hx of      Genitourinary Problems No family hx of      Blood Disease No family hx of      Cancer No family hx of      Congenital Anomalies No family hx of      Connective Tissue Disorder No family hx of      Eye Disorder No family hx of      Gynecology No family hx of      Obesity No family hx of      Osteoporosis No family hx of      Psychotic Disorder No family hx of      Musculoskeletal Disorder No family hx of      Lipids No family hx of      Neurologic Disorder No family hx of      Thyroid Disease No family hx of      Hearing Loss No family hx of          Current Outpatient Prescriptions   Medication Sig Dispense Refill     atorvastatin (LIPITOR) 20 MG tablet Take 1 tablet (20 mg) by mouth daily 90 tablet 3     biotin 1000 MCG TABS tablet Take 1 tablet by mouth daily.       Blood Pressure Monitor KIT 1 Device daily Use to check your blood pressure every day. (Wrist monitor) 1 kit 0     cetirizine (ZYRTEC) 10 MG tablet Take 0.5-1 tablets (5-10 mg) by mouth daily as needed for allergies (itching) 30 tablet 0     Cholecalciferol (VITAMIN D) 1000 UNIT capsule Take 1 capsule by mouth daily Not taking every day       cloNIDine (CATAPRES) 0.2 MG tablet TAKE ONE TABLET BY MOUTH TWICE DAILY 180 tablet 3     GLUCOSAMINE-CHONDROITIN OR TABS 1 tablet qd 0 0     hydrALAZINE (APRESOLINE) 50 MG tablet TAKE ONE TABLET BY MOUTH THREE TIMES DAILY 270 tablet 1     metoprolol succinate (TOPROL-XL) 100 MG 24 hr tablet TAKE ONE AND ONE-HALF TABLETS BY MOUTH AT BEDTIME 135 tablet 1     MULTI-DAY OR TABS    "0     order for DME Equipment being ordered: Blood pressure monitor 1 Device 0     VITAMIN E 400 UNIT OR CAPS 1 qd  0 0     aspirin 81 MG tablet Take 1 tablet by mouth daily.       COMPRESSION STOCKINGS Lower extremities compression stockings  Below the knees  Mild pressure 15-20 MMHG         (Patient not taking: Reported on 11/26/2018) 2 each 0     [DISCONTINUED] metoprolol succinate (TOPROL-XL) 100 MG 24 hr tablet TAKE ONE AND ONE-HALF TABLETS BY MOUTH AT BEDTIME 135 tablet 1     Allergies   Allergen Reactions     No Known Drug Allergies      BP Readings from Last 3 Encounters:   11/26/18 142/66   08/21/18 154/72   06/08/18 140/82    Wt Readings from Last 3 Encounters:   11/26/18 166 lb (75.3 kg)   08/21/18 160 lb (72.6 kg)   06/08/18 155 lb (70.3 kg)                  Labs reviewed in EPIC    ROS:  Constitutional, HEENT, cardiovascular, pulmonary, gi and gu systems are negative, except as otherwise noted.    OBJECTIVE:     /66 (BP Location: Right arm, Patient Position: Chair, Cuff Size: Adult Regular)  Pulse 72  Temp 98  F (36.7  C) (Temporal)  Resp 16  Ht 4' 11\" (1.499 m)  Wt 166 lb (75.3 kg)  SpO2 98%  BMI 33.53 kg/m2  Body mass index is 33.53 kg/(m^2).   Physical Exam   Constitutional: She is oriented to person, place, and time. She appears well-developed and well-nourished.   HENT:   Head: Normocephalic and atraumatic.   Right Ear: External ear normal.   Left Ear: External ear normal.   Mouth/Throat: Oropharynx is clear and moist.   Eyes: EOM are normal.   Cardiovascular: Normal rate and regular rhythm.  Exam reveals no gallop and no friction rub.    Murmur heard.  Pulmonary/Chest: Effort normal and breath sounds normal.   Neurological: She is alert and oriented to person, place, and time.   Psychiatric: She has a normal mood and affect. Her behavior is normal. Judgment and thought content normal.         Diagnostic Test Results:  none     ASSESSMENT/PLAN:     Problem List Items Addressed This " Visit     Anemia, unspecified type     She has not been taking the iron pills. Will restart doing the medications.  Recheck hemoglobin levels today.         Essential hypertension     Advise compliance with medications and diet.  Continue metoprolol, hydralazine, clonidine at the current doses.  Recheck chemistries.   follow-up 3 months         Relevant Medications    metoprolol succinate (TOPROL-XL) 100 MG 24 hr tablet      Other Visit Diagnoses     HTN, goal below 150/90    -  Primary    Relevant Medications    metoprolol succinate (TOPROL-XL) 100 MG 24 hr tablet    Other Relevant Orders    CBC with platelets (Completed)    Comprehensive metabolic panel (BMP + Alb, Alk Phos, ALT, AST, Total. Bili, TP) (Completed)    CRP, inflammation (Completed)    Erythrocyte sedimentation rate auto (Completed)           Kyra Dangelo MD  [facility]

## 2018-11-26 ENCOUNTER — OFFICE VISIT (OUTPATIENT)
Dept: FAMILY MEDICINE | Facility: OTHER | Age: 83
End: 2018-11-26
Payer: COMMERCIAL

## 2018-11-26 VITALS
WEIGHT: 166 LBS | SYSTOLIC BLOOD PRESSURE: 142 MMHG | BODY MASS INDEX: 33.47 KG/M2 | TEMPERATURE: 98 F | HEART RATE: 72 BPM | RESPIRATION RATE: 16 BRPM | HEIGHT: 59 IN | DIASTOLIC BLOOD PRESSURE: 66 MMHG | OXYGEN SATURATION: 98 %

## 2018-11-26 DIAGNOSIS — I10 HTN, GOAL BELOW 150/90: Primary | ICD-10-CM

## 2018-11-26 DIAGNOSIS — I10 ESSENTIAL HYPERTENSION: ICD-10-CM

## 2018-11-26 DIAGNOSIS — D64.9 ANEMIA, UNSPECIFIED TYPE: ICD-10-CM

## 2018-11-26 LAB
ALBUMIN SERPL-MCNC: 3.4 G/DL (ref 3.4–5)
ALP SERPL-CCNC: 104 U/L (ref 40–150)
ALT SERPL W P-5'-P-CCNC: 13 U/L (ref 0–50)
ANION GAP SERPL CALCULATED.3IONS-SCNC: 10 MMOL/L (ref 3–14)
AST SERPL W P-5'-P-CCNC: 27 U/L (ref 0–45)
BILIRUB SERPL-MCNC: 0.5 MG/DL (ref 0.2–1.3)
BUN SERPL-MCNC: 24 MG/DL (ref 7–30)
CALCIUM SERPL-MCNC: 9.4 MG/DL (ref 8.5–10.1)
CHLORIDE SERPL-SCNC: 101 MMOL/L (ref 94–109)
CO2 SERPL-SCNC: 26 MMOL/L (ref 20–32)
CREAT SERPL-MCNC: 0.92 MG/DL (ref 0.52–1.04)
CRP SERPL-MCNC: 4.6 MG/L (ref 0–8)
ERYTHROCYTE [DISTWIDTH] IN BLOOD BY AUTOMATED COUNT: 16.5 % (ref 10–15)
ERYTHROCYTE [SEDIMENTATION RATE] IN BLOOD BY WESTERGREN METHOD: 42 MM/H (ref 0–30)
GFR SERPL CREATININE-BSD FRML MDRD: 58 ML/MIN/1.7M2
GLUCOSE SERPL-MCNC: 101 MG/DL (ref 70–99)
HCT VFR BLD AUTO: 31.4 % (ref 35–47)
HGB BLD-MCNC: 10 G/DL (ref 11.7–15.7)
MCH RBC QN AUTO: 26.1 PG (ref 26.5–33)
MCHC RBC AUTO-ENTMCNC: 31.8 G/DL (ref 31.5–36.5)
MCV RBC AUTO: 82 FL (ref 78–100)
PLATELET # BLD AUTO: 341 10E9/L (ref 150–450)
POTASSIUM SERPL-SCNC: 4.1 MMOL/L (ref 3.4–5.3)
PROT SERPL-MCNC: 7.7 G/DL (ref 6.8–8.8)
RBC # BLD AUTO: 3.83 10E12/L (ref 3.8–5.2)
SODIUM SERPL-SCNC: 137 MMOL/L (ref 133–144)
WBC # BLD AUTO: 6.6 10E9/L (ref 4–11)

## 2018-11-26 PROCEDURE — 86140 C-REACTIVE PROTEIN: CPT | Performed by: FAMILY MEDICINE

## 2018-11-26 PROCEDURE — 36415 COLL VENOUS BLD VENIPUNCTURE: CPT | Performed by: FAMILY MEDICINE

## 2018-11-26 PROCEDURE — 80053 COMPREHEN METABOLIC PANEL: CPT | Performed by: FAMILY MEDICINE

## 2018-11-26 PROCEDURE — 99214 OFFICE O/P EST MOD 30 MIN: CPT | Performed by: FAMILY MEDICINE

## 2018-11-26 PROCEDURE — 85652 RBC SED RATE AUTOMATED: CPT | Performed by: FAMILY MEDICINE

## 2018-11-26 PROCEDURE — 85027 COMPLETE CBC AUTOMATED: CPT | Performed by: FAMILY MEDICINE

## 2018-11-26 RX ORDER — METOPROLOL SUCCINATE 100 MG/1
TABLET, EXTENDED RELEASE ORAL
Qty: 135 TABLET | Refills: 1 | Status: SHIPPED | OUTPATIENT
Start: 2018-11-26 | End: 2019-05-21

## 2018-11-26 ASSESSMENT — PAIN SCALES - GENERAL: PAINLEVEL: NO PAIN (0)

## 2018-11-26 NOTE — ASSESSMENT & PLAN NOTE
She has not been taking the iron pills. Will restart doing the medications.  Recheck hemoglobin levels today.

## 2018-11-26 NOTE — MR AVS SNAPSHOT
"              After Visit Summary   11/26/2018    Nina Villalpando    MRN: [id number]           Patient Information     Date Of Birth          9/21/1930        Visit Information        Provider Department      11/26/2018 11:20 AM Kyra Dangelo MD [facility]        Today's Diagnoses     Need for prophylactic vaccination and inoculation against influenza    -  1    HTN, goal below 150/90           Follow-ups after your visit        Follow-up notes from your care team     Return in about 3 months (around 2/26/2019).      Who to contact     If you have questions or need follow up information about today's clinic visit or your schedule please contact [facility] directly at [phone].  Normal or non-critical lab and imaging results will be communicated to you by MyChart, letter or phone within 4 business days after the clinic has received the results. If you do not hear from us within 7 days, please contact the clinic through MyChart or phone. If you have a critical or abnormal lab result, we will notify you by phone as soon as possible.  Submit refill requests through [employer] or call your pharmacy and they will forward the refill request to us. Please allow 3 business days for your refill to be completed.          Additional Information About Your Visit        Care EveryWhere ID     This is your Care EveryWhere ID. This could be used by other organizations to access your [facility] medical records  [id number]        Your Vitals Were     Pulse Temperature Respirations Height Pulse Oximetry BMI (Body Mass Index)    72 98  F (36.7  C) (Temporal) 16 4' 11\" (1.499 m) 98% 33.53 kg/m2       Blood Pressure from Last 3 Encounters:   11/26/18 142/66   08/21/18 154/72   06/08/18 140/82    Weight from Last 3 Encounters:   11/26/18 166 lb (75.3 kg)   08/21/18 160 lb (72.6 kg)   06/08/18 155 lb (70.3 kg)              We Performed the Following     CBC with platelets     Comprehensive metabolic " panel (BMP + Alb, Alk Phos, ALT, AST, Total. Bili, TP)     CRP, inflammation     Erythrocyte sedimentation rate auto          Where to get your medicines      These medications were sent to [id number] - [place], [place] - [address] [facility]  [address] [facility] [address]     Phone:  [phone]     metoprolol succinate 100 MG 24 hr tablet          Primary Care Provider Office Phone # Fax #    Kyra Dangelo [phone]       [address] [place] [address] [address]  [facility] [address]        Equal Access to Services     [facility]: Hadii aad ku hadasho Soomaali, waaxda luqadaha, qaybta kaalmada adeegyada, tim angeles . So [facility] [phone].    ATENCIÓN: Si habla español, tiene a reaves disposición servicios gratuitos de asistencia lingüística. [facility] [phone].    We comply with applicable federal civil rights laws and [place] laws. We do not discriminate on the basis of race, color, national origin, age, disability, sex, sexual orientation, or gender identity.            Thank you!     Thank you for choosing [facility]  for your care. Our goal is always to provide you with excellent care. Hearing back from our patients is one way we can continue to improve our services. Please take a few minutes to complete the written survey that you may receive in the mail after your visit with us. Thank you!             Your Updated Medication List - Protect others around you: Learn how to safely use, store and throw away your medicines at www.disposemymeds.org.          This list is accurate as of 11/26/18 11:50 AM.  Always use your most recent med list.                   Brand Name Dispense Instructions for use Diagnosis    aspirin 81 MG tablet    ASA     Take 1 tablet by mouth daily.        atorvastatin 20 MG tablet    LIPITOR    90 tablet    Take 1 tablet (20 mg) by mouth daily    Hyperlipidemia, unspecified       biotin 1000 MCG Tabs tablet      Take 1  tablet by mouth daily.        Blood Pressure Monitor Kit     1 kit    1 Device daily Use to check your blood pressure every day. (Wrist monitor)    Benign essential hypertension       cetirizine 10 MG tablet    zyrTEC    30 tablet    Take 0.5-1 tablets (5-10 mg) by mouth daily as needed for allergies (itching)    Generalized pruritus       cloNIDine 0.2 MG tablet    CATAPRES    180 tablet    TAKE ONE TABLET BY MOUTH TWICE DAILY    HTN, goal below 150/90       COMPRESSION STOCKINGS     2 each    Lower extremities compression stockings Below the knees Mild pressure 15-20 MMHG    Varicose veins of legs       Glucosamine-Chondroitin Tabs     0    1 tablet qd        hydrALAZINE 50 MG tablet    APRESOLINE    270 tablet    TAKE ONE TABLET BY MOUTH THREE TIMES DAILY    HTN, goal below 150/90       metoprolol succinate 100 MG 24 hr tablet    TOPROL-XL    135 tablet    TAKE ONE AND ONE-HALF TABLETS BY MOUTH AT BEDTIME    HTN, goal below 150/90       MULTI-DAY Tabs           order for DME     1 Device    Equipment being ordered: Blood pressure monitor    HTN, goal below 150/90       vitamin D 1000 units capsule      Take 1 capsule by mouth daily Not taking every day        vitamin E 400 UNIT capsule    TOCOPHEROL    0    1 qd

## 2018-11-26 NOTE — ASSESSMENT & PLAN NOTE
Advise compliance with medications and diet.  Continue metoprolol, hydralazine, clonidine at the current doses.  Recheck chemistries.   follow-up 3 months

## 2019-05-17 NOTE — PROGRESS NOTES
"  SUBJECTIVE:   Nina Villalpando is a 88 year old female who presents to clinic today for the following health issues:      Healthy Habits:     In general, how would you rate your overall health?  Fair    Frequency of exercise:  None    Do you usually eat at least 4 servings of fruit and vegetables a day, include whole grains    & fiber and avoid regularly eating high fat or \"junk\" foods?  No    Taking medications regularly:  Yes    Medication side effects:  None    Ability to successfully perform activities of daily living:  Transportation requires assistance, housework requires assistance and money management requires assistance    Home Safety:  Lack of grab bars in the bathroom    Hearing Impairment:  Difficulty following a conversation in a noisy restaurant or crowded room and difficulty understanding soft or whispered speech    In the past 6 months, have you been bothered by leaking of urine?  No    In general, how would you rate your overall mental or emotional health?  Good      PHQ-2 Total Score: 0    Additional concerns today:  No  Answers for HPI/ROS submitted by the patient on 5/21/2019   Annual Exam:  abdominal pain: No  Blood in stool: No  Blood in urine: No  chest pain: No  chills: No  congestion: Yes  constipation: No  cough: No  diarrhea: No  dizziness: No  ear pain: Yes  eye pain: No  nervous/anxious: No  fever: No  frequency: No  genital sores: No  headaches: No  hearing loss: No  heartburn: No  arthralgias: Yes  joint swelling: Yes  peripheral edema: Yes  mood changes: No  myalgias: Yes  nausea: No  dysuria: No  palpitations: No  Skin sensation changes: No  sore throat: No  urgency: Yes  rash: Yes  shortness of breath: No  visual disturbance: No  weakness: No  pelvic pain: No  vaginal bleeding: No  vaginal discharge: No  tenderness: No  breast mass: No  breast discharge: No    Hyperlipidemia Follow-Up      Are you having any of the following symptoms? (Select all that apply)  No complaints of " shortness of breath, chest pain or pressure.  No increased sweating or nausea with activity.  No left-sided neck or arm pain.  No complaints of pain in calves when walking 1-2 blocks.    Are you regularly taking any medication or supplement to lower your cholesterol?   Yes- Atrovastatin    Are you having muscle aches or other side effects that you think could be caused by your cholesterol lowering medication?  No      Hypertension Follow-up      Do you check your blood pressure regularly outside of the clinic? No     Are you following a low salt diet? No    Are your blood pressures ever more than 140 on the top number (systolic) OR more   than 90 on the bottom number (diastolic), for example 140/90? No     Additional history: as documented    Reviewed and updated as needed this visit by clinical staff  Tobacco  Allergies  Meds  Med Hx  Surg Hx  Fam Hx  Soc Hx        Reviewed and updated as needed this visit by Provider             Patient Active Problem List   Diagnosis     Primary osteoarthritis of both knees     Anemia, unspecified type     Advanced directives, counseling/discussion     Varicose veins of legs     Essential hypertension     Chronic rhinitis     Hair loss     Health Care Home     Aortic stenosis     Hyperlipidemia, unspecified     CKD (chronic kidney disease) stage 3, GFR 30-59 ml/min (H)     Past Surgical History:   Procedure Laterality Date     C NONSPECIFIC PROCEDURE  1980    Hysterectomy due to menometrorrhagia.  Bladder repair.     CATARACT IOL, RT/LT  6/26/2008    left eye     CATARACT IOL, RT/LT  7/24/2008    right eye     COLONOSCOPY  04/16/2007     HERNIA REPAIR, UMBILICAL  09/27/2007    Incarcerated.     LASER YAG CAPSULOTOMY  5/22/2014    Procedure: LASER YAG CAPSULOTOMY;  Surgeon: Hebert Ledbetter MD;  Location:  OR       Social History     Tobacco Use     Smoking status: Never Smoker     Smokeless tobacco: Never Used     Tobacco comment: no smokers in household    Substance Use Topics     Alcohol use: Yes     Comment: marcelo/waterx1-2/x1-2 q couple of months     Family History   Problem Relation Age of Onset     Diabetes Maternal Grandmother      C.A.D. Sister      Heart Disease Brother      Diabetes Mother      C.A.D. Father      Heart Disease Brother      Heart Disease Brother      Heart Disease Sister      Respiratory Sister      Diabetes Daughter      Anesthesia Reaction No family hx of      Asthma No family hx of      Hypertension No family hx of      Breast Cancer No family hx of      Alcohol/Drug No family hx of      Alzheimer Disease No family hx of      Prostate Cancer No family hx of      Cancer - colorectal No family hx of      Cerebrovascular Disease No family hx of      Allergies No family hx of      Arthritis No family hx of      Cardiovascular No family hx of      Circulatory No family hx of      Depression No family hx of      Endocrine Disease No family hx of      Genetic Disorder No family hx of      Gastrointestinal Disease No family hx of      Genitourinary Problems No family hx of      Blood Disease No family hx of      Cancer No family hx of      Congenital Anomalies No family hx of      Connective Tissue Disorder No family hx of      Eye Disorder No family hx of      Gynecology No family hx of      Obesity No family hx of      Osteoporosis No family hx of      Psychotic Disorder No family hx of      Musculoskeletal Disorder No family hx of      Lipids No family hx of      Neurologic Disorder No family hx of      Thyroid Disease No family hx of      Hearing Loss No family hx of          Current Outpatient Medications   Medication Sig Dispense Refill     atorvastatin (LIPITOR) 20 MG tablet Take 1 tablet (20 mg) by mouth daily 90 tablet 3     Blood Pressure Monitor KIT 1 Device daily Use to check your blood pressure every day. (Wrist monitor) 1 kit 0     Cholecalciferol (VITAMIN D) 1000 UNIT capsule Take 1 capsule by mouth daily Not taking every day        cloNIDine (CATAPRES) 0.2 MG tablet TAKE ONE TABLET BY MOUTH TWICE DAILY 180 tablet 3     COMPRESSION STOCKINGS Lower extremities compression stockings  Below the knees  Mild pressure 15-20 MMHG         2 each 0     diclofenac (VOLTAREN) 1 % topical gel Place 2 g onto the skin 2 times daily as needed for moderate pain 100 g 3     GLUCOSAMINE-CHONDROITIN OR TABS 1 tablet qd 0 0     hydrALAZINE (APRESOLINE) 50 MG tablet Take 1 tablet (50 mg) by mouth 3 times daily 270 tablet 1     metoprolol succinate ER (TOPROL-XL) 100 MG 24 hr tablet TAKE ONE AND ONE-HALF TABLETS BY MOUTH AT BEDTIME 135 tablet 1     MULTI-DAY OR TABS   0     order for DME Equipment being ordered: Blood pressure monitor 1 Device 0     VITAMIN E 400 UNIT OR CAPS 1 qd  0 0     aspirin 81 MG tablet Take 1 tablet by mouth daily.       biotin 1000 MCG TABS tablet Take 1 tablet by mouth daily.       Allergies   Allergen Reactions     No Known Drug Allergies      Recent Labs   Lab Test 05/21/19  1229 11/26/18  1153  05/17/18  0105 05/03/18  1235  03/21/17  1412 08/26/16  1018  05/22/13  1212  12/07/11  1014   A1C  --   --   --   --   --   --   --   --   --  5.9  --  5.9   LDL 96  --   --   --  70  --   --  47   < >  --   --   --    HDL 55  --   --   --  52  --   --  57   < >  --   --   --    TRIG 73  --   --   --  77  --   --  91   < >  --   --   --    ALT 11 13  --  13 12   < > 16 14   < >  --   --   --    CR 0.97 0.92   < > 0.96 0.95   < > 0.82 0.97   < > 0.88   < >  --    GFRESTIMATED 52* 58*   < > 55* 55*   < > 66 54*   < > 62   < >  --    GFRESTBLACK 60* 70   < > 67 67   < > 80 66   < > 74   < >  --    POTASSIUM 4.5 4.1   < > 3.7 4.1   < > 4.0 4.5   < > 4.2   < >  --    TSH  --   --   --   --  2.17  --  3.22  --    < >  --    < >  --     < > = values in this interval not displayed.      BP Readings from Last 3 Encounters:   05/21/19 138/80   11/26/18 142/66   08/21/18 154/72    Wt Readings from Last 3 Encounters:   05/21/19 74.4 kg (164 lb)  "  11/26/18 75.3 kg (166 lb)   08/21/18 72.6 kg (160 lb)          Review of Systems   All other systems reviewed and are negative.      /80 (BP Location: Right arm, Patient Position: Chair, Cuff Size: Adult Regular)   Pulse 77   Temp 97.6  F (36.4  C) (Temporal)   Resp 18   Ht 1.499 m (4' 11\")   Wt 74.4 kg (164 lb)   SpO2 98%   BMI 33.12 kg/m    Physical Exam   Constitutional: She appears well-developed and well-nourished.   HENT:   Head: Normocephalic and atraumatic.   Eyes: EOM are normal.   Neck: Neck supple.   Cardiovascular: Normal rate, regular rhythm, normal heart sounds and intact distal pulses. Exam reveals no gallop and no friction rub.   No murmur heard.  Pulmonary/Chest: Effort normal and breath sounds normal. No stridor. No respiratory distress. She has no wheezes. She has no rales. She exhibits no tenderness.   Abdominal: Soft. Bowel sounds are normal.   Skin:   Dystrophic nails   Psychiatric: She has a normal mood and affect. Her behavior is normal. Judgment and thought content normal.             Assessment & Plan   Problem List Items Addressed This Visit     Primary osteoarthritis of both knees    Relevant Medications    diclofenac (VOLTAREN) 1 % topical gel    Other Relevant Orders    HOME CARE NURSING REFERRAL    Anemia, unspecified type - Primary     Continue iron pills         Relevant Orders    CBC with platelets (Completed)    Essential hypertension     Advise compliance with medications and diet.  Continue metoprolol, hydralazine, clonidine at the current doses.  Recheck chemistries.   follow-up 3 months         Relevant Medications    metoprolol succinate ER (TOPROL-XL) 100 MG 24 hr tablet    hydrALAZINE (APRESOLINE) 50 MG tablet    [facility]     Offered care co-ordination to help with getting her a lifeline and HHA as she lives alone and has been having trouble with her shoulder affecting her personal care and activities of daily living. She does not drive and is " "increased risk for falls due to balance problems and is home bound         Hyperlipidemia, unspecified    Relevant Orders    Lipid panel reflex to direct LDL Fasting (Completed)    Comprehensive metabolic panel (Completed)    CKD (chronic kidney disease) stage 3, GFR 30-59 ml/min (H)     Recheck chemistries . Stable kidney function. No OTC Ibuprofen. Will consider topical diclofenac topically for symptoms of osteoarthritis           Other Visit Diagnoses     HTN, goal below 150/90        Relevant Medications    metoprolol succinate ER (TOPROL-XL) 100 MG 24 hr tablet    hydrALAZINE (APRESOLINE) 50 MG tablet    Adhesive capsulitis of right shoulder        Relevant Orders    HOME CARE NURSING REFERRAL    Dystrophic nail        Relevant Medications    diclofenac (VOLTAREN) 1 % topical gel    Other Relevant Orders    PODIATRY/FOOT & ANKLE SURGERY REFERRAL             BMI:   Estimated body mass index is 33.12 kg/m  as calculated from the following:    Height as of this encounter: 1.499 m (4' 11\").    Weight as of this encounter: 74.4 kg (164 lb).           See Patient Instructions  Return in about 6 months (around 11/21/2019).    Kyra Dangelo MD  [facility]  Answers for HPI/ROS submitted by the patient on 5/21/2019   Annual Exam:  abdominal pain: No  Blood in stool: No  Blood in urine: No  chest pain: No  chills: No  congestion: Yes  constipation: No  cough: No  diarrhea: No  dizziness: No  ear pain: Yes  eye pain: No  nervous/anxious: No  fever: No  frequency: No  genital sores: No  headaches: No  hearing loss: No  heartburn: No  arthralgias: Yes  joint swelling: Yes  peripheral edema: Yes  mood changes: No  myalgias: Yes  nausea: No  dysuria: No  palpitations: No  Skin sensation changes: No  sore throat: No  urgency: Yes  rash: Yes  shortness of breath: No  visual disturbance: No  weakness: No  pelvic pain: No  vaginal bleeding: No  vaginal discharge: No  tenderness: No  breast mass: No  breast " discharge: No

## 2019-05-21 ENCOUNTER — TELEPHONE (OUTPATIENT)
Dept: FAMILY MEDICINE | Facility: OTHER | Age: 84
End: 2019-05-21

## 2019-05-21 ENCOUNTER — OFFICE VISIT (OUTPATIENT)
Dept: FAMILY MEDICINE | Facility: OTHER | Age: 84
End: 2019-05-21
Payer: COMMERCIAL

## 2019-05-21 VITALS
BODY MASS INDEX: 33.06 KG/M2 | WEIGHT: 164 LBS | SYSTOLIC BLOOD PRESSURE: 138 MMHG | HEIGHT: 59 IN | DIASTOLIC BLOOD PRESSURE: 80 MMHG | HEART RATE: 77 BPM | TEMPERATURE: 97.6 F | OXYGEN SATURATION: 98 % | RESPIRATION RATE: 18 BRPM

## 2019-05-21 DIAGNOSIS — I10 ESSENTIAL HYPERTENSION: ICD-10-CM

## 2019-05-21 DIAGNOSIS — D64.9 ANEMIA, UNSPECIFIED TYPE: Primary | ICD-10-CM

## 2019-05-21 DIAGNOSIS — M75.01 ADHESIVE CAPSULITIS OF RIGHT SHOULDER: ICD-10-CM

## 2019-05-21 DIAGNOSIS — M17.0 PRIMARY OSTEOARTHRITIS OF BOTH KNEES: ICD-10-CM

## 2019-05-21 DIAGNOSIS — N18.30 CKD (CHRONIC KIDNEY DISEASE) STAGE 3, GFR 30-59 ML/MIN (H): ICD-10-CM

## 2019-05-21 DIAGNOSIS — I10 HTN, GOAL BELOW 150/90: ICD-10-CM

## 2019-05-21 DIAGNOSIS — L60.3 DYSTROPHIC NAIL: ICD-10-CM

## 2019-05-21 DIAGNOSIS — E78.5 HYPERLIPIDEMIA, UNSPECIFIED HYPERLIPIDEMIA TYPE: ICD-10-CM

## 2019-05-21 DIAGNOSIS — Z76.89 HEALTH CARE HOME: ICD-10-CM

## 2019-05-21 LAB
ALBUMIN SERPL-MCNC: 3.5 G/DL (ref 3.4–5)
ALP SERPL-CCNC: 117 U/L (ref 40–150)
ALT SERPL W P-5'-P-CCNC: 11 U/L (ref 0–50)
ANION GAP SERPL CALCULATED.3IONS-SCNC: 7 MMOL/L (ref 3–14)
AST SERPL W P-5'-P-CCNC: 26 U/L (ref 0–45)
BILIRUB SERPL-MCNC: 0.4 MG/DL (ref 0.2–1.3)
BUN SERPL-MCNC: 29 MG/DL (ref 7–30)
CALCIUM SERPL-MCNC: 9.7 MG/DL (ref 8.5–10.1)
CHLORIDE SERPL-SCNC: 102 MMOL/L (ref 94–109)
CHOLEST SERPL-MCNC: 166 MG/DL
CO2 SERPL-SCNC: 28 MMOL/L (ref 20–32)
CREAT SERPL-MCNC: 0.97 MG/DL (ref 0.52–1.04)
ERYTHROCYTE [DISTWIDTH] IN BLOOD BY AUTOMATED COUNT: 15.1 % (ref 10–15)
GFR SERPL CREATININE-BSD FRML MDRD: 52 ML/MIN/{1.73_M2}
GLUCOSE SERPL-MCNC: 102 MG/DL (ref 70–99)
HCT VFR BLD AUTO: 35.8 % (ref 35–47)
HDLC SERPL-MCNC: 55 MG/DL
HGB BLD-MCNC: 11.9 G/DL (ref 11.7–15.7)
LDLC SERPL CALC-MCNC: 96 MG/DL
MCH RBC QN AUTO: 29 PG (ref 26.5–33)
MCHC RBC AUTO-ENTMCNC: 33.2 G/DL (ref 31.5–36.5)
MCV RBC AUTO: 87 FL (ref 78–100)
NONHDLC SERPL-MCNC: 111 MG/DL
PLATELET # BLD AUTO: 320 10E9/L (ref 150–450)
POTASSIUM SERPL-SCNC: 4.5 MMOL/L (ref 3.4–5.3)
PROT SERPL-MCNC: 7.8 G/DL (ref 6.8–8.8)
RBC # BLD AUTO: 4.1 10E12/L (ref 3.8–5.2)
SODIUM SERPL-SCNC: 137 MMOL/L (ref 133–144)
TRIGL SERPL-MCNC: 73 MG/DL
WBC # BLD AUTO: 7.7 10E9/L (ref 4–11)

## 2019-05-21 PROCEDURE — 85027 COMPLETE CBC AUTOMATED: CPT | Performed by: FAMILY MEDICINE

## 2019-05-21 PROCEDURE — 80053 COMPREHEN METABOLIC PANEL: CPT | Performed by: FAMILY MEDICINE

## 2019-05-21 PROCEDURE — 99214 OFFICE O/P EST MOD 30 MIN: CPT | Performed by: FAMILY MEDICINE

## 2019-05-21 PROCEDURE — 80061 LIPID PANEL: CPT | Performed by: FAMILY MEDICINE

## 2019-05-21 PROCEDURE — 36415 COLL VENOUS BLD VENIPUNCTURE: CPT | Performed by: FAMILY MEDICINE

## 2019-05-21 RX ORDER — HYDRALAZINE HYDROCHLORIDE 50 MG/1
50 TABLET, FILM COATED ORAL 3 TIMES DAILY
Qty: 270 TABLET | Refills: 1 | Status: SHIPPED | OUTPATIENT
Start: 2019-05-21 | End: 2020-01-06

## 2019-05-21 RX ORDER — METOPROLOL SUCCINATE 100 MG/1
TABLET, EXTENDED RELEASE ORAL
Qty: 135 TABLET | Refills: 1 | Status: SHIPPED | OUTPATIENT
Start: 2019-05-21 | End: 2019-11-25

## 2019-05-21 ASSESSMENT — ENCOUNTER SYMPTOMS
JOINT SWELLING: 1
SHORTNESS OF BREATH: 0
HEMATURIA: 0
DIARRHEA: 0
MYALGIAS: 1
EYE PAIN: 0
SORE THROAT: 0
HEADACHES: 0
HEMATOCHEZIA: 0
ABDOMINAL PAIN: 0
CONSTIPATION: 0
FREQUENCY: 0
DYSURIA: 0
NAUSEA: 0
HEARTBURN: 0
CHILLS: 0
WEAKNESS: 0
PALPITATIONS: 0
ARTHRALGIAS: 1
PARESTHESIAS: 0
DIZZINESS: 0
BREAST MASS: 0
NERVOUS/ANXIOUS: 0
FEVER: 0
COUGH: 0

## 2019-05-21 ASSESSMENT — MIFFLIN-ST. JEOR: SCORE: 1079.53

## 2019-05-21 ASSESSMENT — ACTIVITIES OF DAILY LIVING (ADL)
CURRENT_FUNCTION: MONEY MANAGEMENT REQUIRES ASSISTANCE
CURRENT_FUNCTION: TRANSPORTATION REQUIRES ASSISTANCE
CURRENT_FUNCTION: HOUSEWORK REQUIRES ASSISTANCE

## 2019-05-21 ASSESSMENT — PAIN SCALES - GENERAL: PAINLEVEL: NO PAIN (0)

## 2019-05-21 NOTE — TELEPHONE ENCOUNTER
Attempted to reach the patient with the following information.  Left message for patient to return call to clinic.     Bessy Tucker MA

## 2019-05-21 NOTE — LETTER
[facility]  [address]  [place] [id number] [phone]        May 22, 2019    [address] [facility]ando  [address] [facility] [address]          Dear Nina,    Your recent labs were essentially normal. Please contact the clinic with any questions or concerns.     Results for orders placed or performed in visit on 05/21/19   Lipid panel reflex to direct LDL Fasting   Result Value Ref Range    Cholesterol 166 <200 mg/dL    Triglycerides 73 <150 mg/dL    HDL Cholesterol 55 >49 mg/dL    LDL Cholesterol Calculated 96 <100 mg/dL    Non HDL Cholesterol 111 <130 mg/dL   CBC with platelets   Result Value Ref Range    WBC 7.7 4.0 - 11.0 10e9/L    RBC Count 4.10 3.8 - 5.2 10e12/L    Hemoglobin 11.9 11.7 - 15.7 g/dL    Hematocrit 35.8 35.0 - 47.0 %    MCV 87 78 - 100 fl    MCH 29.0 26.5 - 33.0 pg    MCHC 33.2 31.5 - 36.5 g/dL    RDW 15.1 (H) 10.0 - 15.0 %    Platelet Count 320 150 - 450 10e9/L   Comprehensive metabolic panel   Result Value Ref Range    Sodium 137 133 - 144 mmol/L    Potassium 4.5 3.4 - 5.3 mmol/L    Chloride 102 94 - 109 mmol/L    Carbon Dioxide 28 20 - 32 mmol/L    Anion Gap 7 3 - 14 mmol/L    Glucose 102 (H) 70 - 99 mg/dL    Urea Nitrogen 29 7 - 30 mg/dL    Creatinine 0.97 0.52 - 1.04 mg/dL    GFR Estimate 52 (L) >60 mL/min/[1.73_m2]    GFR Estimate If Black 60 (L) >60 mL/min/[1.73_m2]    Calcium 9.7 8.5 - 10.1 mg/dL    Bilirubin Total 0.4 0.2 - 1.3 mg/dL    Albumin 3.5 3.4 - 5.0 g/dL    Protein Total 7.8 6.8 - 8.8 g/dL    Alkaline Phosphatase 117 40 - 150 U/L    ALT 11 0 - 50 U/L    AST 26 0 - 45 U/L     Sincerely,    Ale Dangelo MD

## 2019-05-21 NOTE — TELEPHONE ENCOUNTER
----- Message from Kyra Dangelo MD sent at 5/21/2019  1:22 PM CDT -----  Improved and normal hemoglobin levels

## 2019-05-24 NOTE — ASSESSMENT & PLAN NOTE
Offered care co-ordination to help with getting her a lifeline and HHA as she lives alone and has been having trouble with her shoulder affecting her personal care and activities of daily living. She does not drive and is increased risk for falls due to balance problems and is home bound

## 2019-05-24 NOTE — ASSESSMENT & PLAN NOTE
Recheck chemistries . Stable kidney function. No OTC Ibuprofen. Will consider topical diclofenac topically for symptoms of osteoarthritis

## 2019-05-28 ENCOUNTER — OFFICE VISIT (OUTPATIENT)
Dept: PODIATRY | Facility: OTHER | Age: 84
End: 2019-05-28
Payer: COMMERCIAL

## 2019-05-28 VITALS — HEIGHT: 59 IN | WEIGHT: 164 LBS | TEMPERATURE: 97.4 F | BODY MASS INDEX: 33.06 KG/M2

## 2019-05-28 DIAGNOSIS — M20.42 HAMMERTOE OF LEFT FOOT: ICD-10-CM

## 2019-05-28 DIAGNOSIS — L60.3 ONYCHODYSTROPHY: ICD-10-CM

## 2019-05-28 DIAGNOSIS — L85.9 HYPERKERATOSIS: Primary | ICD-10-CM

## 2019-05-28 DIAGNOSIS — Z98.62 PERIPHERAL VASCULAR ANGIOPLASTY STATUS: ICD-10-CM

## 2019-05-28 PROCEDURE — 99213 OFFICE O/P EST LOW 20 MIN: CPT | Mod: 25 | Performed by: PODIATRIST

## 2019-05-28 PROCEDURE — 11056 PARNG/CUTG B9 HYPRKR LES 2-4: CPT | Mod: Q9 | Performed by: PODIATRIST

## 2019-05-28 PROCEDURE — 11721 DEBRIDE NAIL 6 OR MORE: CPT | Mod: 59 | Performed by: PODIATRIST

## 2019-05-28 ASSESSMENT — PAIN SCALES - GENERAL: PAINLEVEL: NO PAIN (0)

## 2019-05-28 ASSESSMENT — MIFFLIN-ST. JEOR: SCORE: 1079.53

## 2019-05-28 NOTE — PATIENT INSTRUCTIONS
"Nail Debridement    A high quality instrument makes trimming toenails MUCH easier.  Search ebay for any 5\" nail nipper manufactured by reliable brands such as Miltex, Integra or Jarit as these quality instruments will help manage difficult nails more effectively and comfortably. We use Miltex -SS.  A physician is not necessary to trim nails even if you are taking blood thinners or are diabetic.  Your family or care givers may help manage your toenails.      Trim or sand the nails once weekly.  Do not wait until they are long and painful or trimming will become too difficult and painful and will increase your risk of complications or infection.  A course file or 120 grit sandpaper on a sanding block can be helpful.  For very thick nails many people prefer battery operated fenton such as an Amope', Personal Pedi and Emjoi for regular use or heavy painful callouses or thick toenails.    Trim or skive any portion of nail that is thick, loose, crumbling, or not well attached. Do not tear the nail away, but rather cut them with a nail nipperor sand or sand them down.  You may follow up with your Podiatric Physician if you have pain, bleeding, infection, questions or other concerns.      You may also contact the following Registered Nurses for further help with nail debridement and minor hygiene concnerns.  They may come to your home or meet them at their clinic to trim your toenails and soak your feet, as well as monitor for any complications that would require evaluation by a Physician.      [facility]  Emeli Betancourt RN  Office [phone]    [facility]  Estrella Ramirez RN  [phone]   Call or text for appointment  Some home visits and has a clinic at:  [address] [place] [address]    [facility]  Ruslan Berg RN  [phone]    [facility]  [phone]  [facility]  [phone]  [email or web address] - [facility]    For up to date list and to find foot care nurses in other communities visit [employer] Association website:  afcna.org.     Calluses, Corns, IPKs, Porokeratosis    When there is excessive friction or pressure on the skin, the body responds by making the skin thicker.  While this may protect the deeper structures, the thickened skin can take up more space and thus increase pressure over a bony prominence or become an open sore or skin ulcer as this skin becomes less flexible.    Flat, diffuse thickening are simple calluses and they are usually caused by friction.  Often these are the result of rubbing on a shoe or going barefoot.    Calluses with a central core between the toes are called corns.  These often result from prominent joints on adjacent toes rubbing together.  Theses are often a symptom of bone malalignment and will usually recur unless the underlying bones are addressed.    Many of these lesions can be kept comfortable with routine maintenance. This consists of filing them with a Ped Egg, callus file, or 120 grit sandpaper on a block, every day during your bath or shower.  Most people prefer battery operated fenton such as an Amope', Personal Pedi and Emjoi for regular use or heavy painful callouses.  Heavy creams or ointments can be applied 1-2 times every day to keep them soft. Toe spacers can be used for corns, gel pads can be used for other lesions on the bottom of the foot. If there is a deformity noted, such as a prominent bone, often this can be addressed to minimize recurrence. However, sometimes the pressure and lesion simply migrates to another spot after surgery, so it is not a guaranteed cure.     If you have severe callouses and cracking, you may apply heavy ointments that you scoop up such as Cetaphil cream, Eucerin, Aquaphor or Vaseline.  Be sure to obtain cream or ointment in these brands and not lotion  (lotion is water based and not durable enough for feet). For more aggressive help apply heavy creams or ointment under occlusive dressings such as Saran Wrap or Jelly Feet while sleeping.   Jelly Feet can be obtained at [email or web address].     To be successful with managing hyperkeratotic skin, you must manage hygiene daily.  Apply the cream once or twice EVERY day.  At your bath or shower time is the easiest time to work on this when skin is most soft.  There is no medical or surgical treatment that will absolutely eliminate many of these symptoms.      Ped[employer]ix is a reliable source for all sorts of foot pads, cushions, or interdigital spacers and foot appliances. Go to www.[employer].vLine or request a catalog at 6-956-EndoInSight.        Please call with any additional questions.

## 2019-05-28 NOTE — PROGRESS NOTES
Chief Complaint   Patient presents with     RECHECK     callus Left great toe and heel, Left hammertoe, thick toenails; LOV 8/22/2017     Consult     Ref by Kyra Dangelo MD       Weight management plan: Patient was referred to their PCP to discuss a diet and exercise plan.     HPI:  Nina Villalpando is a 86 year old female who is seen in consultation at the request of Promise Barroso CNP.    Pt presents for eval of:   (Onset, Location, L/R, Character, Treatments, Injury if yes)     Ongoing for past few years, increasing in size over past few months. Callus plantar Left great toe and heel. Dark discoloration noted throughout and it is nearly an inch baby. Also hammer toes  Thick, discolored toenails  Intermittent sharp, dull ache, pain 5 w/increase in activity    Retired.    ROS:  10 point ROS neg other than the symptoms noted above in the HPI.    PAST MEDICAL HISTORY:   Past Medical History:   Diagnosis Date     Carpal tunnel syndrome     bilateral     Diverticulitis of colon (without mention of hemorrhage)(562.11)     years ago     Generalized osteoarthrosis, unspecified site      Hernia of unspecified site of abdominal cavity without mention of obstruction or gangrene     hiatal hernia, umbilical hernia     Other and unspecified hyperlipidemia      Unspecified essential hypertension      Unspecified sinusitis (chronic)     sinus problems        PAST SURGICAL HISTORY:   Past Surgical History:   Procedure Laterality Date     C NONSPECIFIC PROCEDURE  1980    Hysterectomy due to menometrorrhagia.  Bladder repair.     CATARACT IOL, RT/LT  6/26/2008    left eye     CATARACT IOL, RT/LT  7/24/2008    right eye     COLONOSCOPY  04/16/2007     HERNIA REPAIR, UMBILICAL  09/27/2007    Incarcerated.     LASER YAG CAPSULOTOMY  5/22/2014    Procedure: LASER YAG CAPSULOTOMY;  Surgeon: Hebert Ledbetter MD;  Location:  OR        MEDICATIONS:   Current Outpatient Medications:      aspirin 81 MG tablet, Take 1  tablet by mouth daily., Disp: , Rfl:      atorvastatin (LIPITOR) 20 MG tablet, Take 1 tablet (20 mg) by mouth daily, Disp: 90 tablet, Rfl: 3     biotin 1000 MCG TABS tablet, Take 1 tablet by mouth daily., Disp: , Rfl:      Blood Pressure Monitor KIT, 1 Device daily Use to check your blood pressure every day. (Wrist monitor), Disp: 1 kit, Rfl: 0     Cholecalciferol (VITAMIN D) 1000 UNIT capsule, Take 1 capsule by mouth daily Not taking every day, Disp: , Rfl:      cloNIDine (CATAPRES) 0.2 MG tablet, TAKE ONE TABLET BY MOUTH TWICE DAILY, Disp: 180 tablet, Rfl: 3     COMPRESSION STOCKINGS, Lower extremities compression stockings Below the knees Mild pressure 15-20 MMHG    , Disp: 2 each, Rfl: 0     diclofenac (VOLTAREN) 1 % topical gel, Place 2 g onto the skin 2 times daily as needed for moderate pain, Disp: 100 g, Rfl: 3     GLUCOSAMINE-CHONDROITIN OR TABS, 1 tablet qd, Disp: 0, Rfl: 0     hydrALAZINE (APRESOLINE) 50 MG tablet, Take 1 tablet (50 mg) by mouth 3 times daily, Disp: 270 tablet, Rfl: 1     metoprolol succinate ER (TOPROL-XL) 100 MG 24 hr tablet, TAKE ONE AND ONE-HALF TABLETS BY MOUTH AT BEDTIME, Disp: 135 tablet, Rfl: 1     MULTI-DAY OR TABS, , Disp: , Rfl: 0     order for DME, Equipment being ordered: Blood pressure monitor, Disp: 1 Device, Rfl: 0     VITAMIN E 400 UNIT OR CAPS, 1 qd , Disp: 0, Rfl: 0     ALLERGIES:    Allergies   Allergen Reactions     No Known Drug Allergies         SOCIAL HISTORY:   Social History     Socioeconomic History     Marital status:      Spouse name: Not on file     Number of children: 2     Years of education: Not on file     Highest education level: Not on file   Occupational History     Occupation: retired     Employer: retired   Social Needs     Financial resource strain: Not on file     Food insecurity:     Worry: Not on file     Inability: Not on file     Transportation needs:     Medical: Not on file     Non-medical: Not on file   Tobacco Use     Smoking status:  Never Smoker     Smokeless tobacco: Never Used     Tobacco comment: no smokers in household   Substance and Sexual Activity     Alcohol use: Yes     Comment: marcelo/waterx1-2/x1-2 q couple of months     Drug use: No     Sexual activity: Yes     Partners: Male     Birth control/protection: Surgical     Comment: partial hyst/bilateral ovaries remained   Lifestyle     Physical activity:     Days per week: Not on file     Minutes per session: Not on file     Stress: Not on file   Relationships     Social connections:     Talks on phone: Not on file     Gets together: Not on file     Attends Taoist service: Not on file     Active member of club or organization: Not on file     Attends meetings of clubs or organizations: Not on file     Relationship status: Not on file     Intimate partner violence:     Fear of current or ex partner: Not on file     Emotionally abused: Not on file     Physically abused: Not on file     Forced sexual activity: Not on file   Other Topics Concern      Service No     Blood Transfusions No     Caffeine Concern Yes     Comment: 4-5 cups coffee daily     Occupational Exposure No     Hobby Hazards No     Sleep Concern No     Stress Concern No     Weight Concern Yes     Comment: would like nohemy ose weight     Special Diet No     Back Care No     Exercise Yes     Bike Helmet No     Comment: doesn' t bike     Seat Belt Yes     Self-Exams Yes     Parent/sibling w/ CABG, MI or angioplasty before 65F 55M? No   Social History Narrative     Not on file        FAMILY HISTORY:   Family History   Problem Relation Age of Onset     Diabetes Maternal Grandmother      C.A.D. Sister      Heart Disease Brother      Diabetes Mother      C.A.D. Father      Heart Disease Brother      Heart Disease Brother      Heart Disease Sister      Respiratory Sister      Diabetes Daughter      Anesthesia Reaction No family hx of      Asthma No family hx of      Hypertension No family hx of      Breast Cancer No family  "hx of      Alcohol/Drug No family hx of      Alzheimer Disease No family hx of      Prostate Cancer No family hx of      Cancer - colorectal No family hx of      Cerebrovascular Disease No family hx of      Allergies No family hx of      Arthritis No family hx of      Cardiovascular No family hx of      Circulatory No family hx of      Depression No family hx of      Endocrine Disease No family hx of      Genetic Disorder No family hx of      Gastrointestinal Disease No family hx of      Genitourinary Problems No family hx of      Blood Disease No family hx of      Cancer No family hx of      Congenital Anomalies No family hx of      Connective Tissue Disorder No family hx of      Eye Disorder No family hx of      Gynecology No family hx of      Obesity No family hx of      Osteoporosis No family hx of      Psychotic Disorder No family hx of      Musculoskeletal Disorder No family hx of      Lipids No family hx of      Neurologic Disorder No family hx of      Thyroid Disease No family hx of      Hearing Loss No family hx of         EXAM:Vitals: Temp 97.4  F (36.3  C) (Temporal)   Ht 1.499 m (4' 11\")   Wt 74.4 kg (164 lb)   BMI 33.12 kg/m    BMI= Body mass index is 33.12 kg/m .    General appearance: Patient is alert and fully cooperative with history & exam.  No sign of distress is noted during the visit.     Psychiatric: Affect is pleasant & appropriate.  Patient appears motivated to improve health.     Respiratory: Breathing is regular & unlabored while sitting.     HEENT: Hearing is intact to spoken word.  Speech is clear.  No gross evidence of visual impairment that would impact ambulation.     Vascular: DP & PT pulses  1/4, CFT delayed at 3 seconds, diminished hair growth and multiple varicosities and 1 cm pitting edema bilateral lower extremity. Temperature warm to cool proximal to distal.     Neurologic: Lower extremity sensation is intact to light touch.  Protective threshold intact bilateral. Normal " plantar response equal intact bilateral.    Dermatologic: diminished texture turgor tone about the integument. All 10 toenails are thick and discolored elongated with subungual debris painful and lysing with debridement. A painful nucleated hyperkeratosis noted about the plantar central calcaneus on the left foot. A second lesion noted at the left hallux IPJ but the plantar medial aspect. This lesion is 2 cm across and more than 1 cm deep. Dry hematogenous exudate noted throughout the entire lesion however upon debridement to the dermis no full-thickness  ulceration is noted. There is maceration in the deep aspect over this area consistent with a grade 0 ulceration.      Musculoskeletal: Patient is ambulatory without assistive device or brace.  valgus deformity noted about the forefoot. Limited hallux IPJ bilateral left greater than right. There is bone prominence about the plantar medial hallux IPJ on the left foot therefore the hyperkeratosis.     ASSESSMENT:       ICD-10-CM    1. Hyperkeratosis L85.9 TRIM HYPERKERATOTIC SKIN LESION,2-4   2. 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 Peripheral vascular angioplasty status Z98.62 TRIM HYPERKERATOTIC SKIN LESION,2-4     DEBRIDEMENT OF NAILS, 6 OR MORE   4. Hammertoe of left foot M20.42 TRIM HYPERKERATOTIC SKIN LESION,2-4     DEBRIDEMENT OF NAILS, 6 OR MORE        PLAN:  Reviewed patient's chart in [facility].      8/22/2017   I debrided all 10 nails manually and mechanically.   I debrided the hyperkeratosis with a 15 blade scalpel to the level of dermis no dressing applied  Written instructions regarding appropriate shoe gear to accommodate the osteoarthritis deformity on the left hallux hammertoe.  Written instructions on how to manage this at home recommending abrasive debridement every bath or shower  All questions were answered and she is satisfied with this treatment retained.  Follow up as needed    5/28/2019  I debrided all 10 nails manually and  mechanically.   I debrided to hyperkeratosis with a 15 blade scalpel to the level of dermis no dressing applied  Written instructions regarding appropriate shoe gear to accommodate the osteoarthritis deformity on the left hallux hammertoe.  Written instructions on how to manage this at home recommending abrasive debridement every bath or shower  All questions were answered and she is satisfied with this treatment retained.  Follow up as needed      Christiano Gonzalez DPM

## 2019-10-22 DIAGNOSIS — I10 HTN, GOAL BELOW 150/90: ICD-10-CM

## 2019-10-23 RX ORDER — CLONIDINE HYDROCHLORIDE 0.2 MG/1
TABLET ORAL
Qty: 180 TABLET | Refills: 0 | Status: SHIPPED | OUTPATIENT
Start: 2019-10-23 | End: 2020-01-06

## 2019-10-23 NOTE — TELEPHONE ENCOUNTER
Prescription approved per [facility] Refill Protocol.  Kwabena Flores, RN, BSN

## 2019-11-25 DIAGNOSIS — I10 HTN, GOAL BELOW 150/90: ICD-10-CM

## 2019-11-25 NOTE — LETTER
[facility] [address]   [facility] [address]  Phone: [phone]    11/27/19    Nina Villalpando  [address] [facility] [address]      Dear Nina,      We have tried to reach you via phone without success.  We are writing to inform you that your medication for metoprolol succinate has been refilled for 1 month only.  You are due for a follow up in clinic prior to any further refills.  Please call to schedule.      Sincerely,      TAPAN Paniagua

## 2019-11-26 RX ORDER — METOPROLOL SUCCINATE 100 MG/1
TABLET, EXTENDED RELEASE ORAL
Qty: 135 TABLET | Refills: 1 | Status: SHIPPED | OUTPATIENT
Start: 2019-11-26 | End: 2020-06-04

## 2019-11-26 NOTE — TELEPHONE ENCOUNTER
Prescription approved per [facility] Refill Protocol.    Due for 6 month follow up per LOV.     Jannie De Leon, RN, BSN

## 2019-11-26 NOTE — TELEPHONE ENCOUNTER
LM for patient to return phone call to clinic about message below.  Daina Royal CMA ([facility])

## 2019-11-27 NOTE — TELEPHONE ENCOUNTER
LM for patient to return phone call to clinic about message below.  Letter sent.  Daina Royal CMA ([facility])

## 2019-12-31 NOTE — PROGRESS NOTES
"Subjective     Nina Villalpando is a 89 year old female who presents to clinic today for the following health issues:    HPI   {SUPERLIST (Optional):558500}  {additonal problems for provider to add (Optional):824887}    {HIST REVIEW/ LINKS 2 (Optional):503765}    Reviewed and updated as needed this visit by Provider         Review of Systems   {ROS COMP (Optional):421545}      Objective    There were no vitals taken for this visit.  There is no height or weight on file to calculate BMI.  Physical Exam   {Exam List (Optional):401963}    {Diagnostic Test Results (Optional):825477::\"Diagnostic Test Results:\",\"Labs reviewed in Epic\"}        {PROVIDER CHARTING PREFERENCE:246800}    "

## 2020-01-06 ENCOUNTER — OFFICE VISIT (OUTPATIENT)
Dept: FAMILY MEDICINE | Facility: OTHER | Age: 85
End: 2020-01-06
Payer: COMMERCIAL

## 2020-01-06 VITALS
HEART RATE: 60 BPM | TEMPERATURE: 97.7 F | DIASTOLIC BLOOD PRESSURE: 66 MMHG | SYSTOLIC BLOOD PRESSURE: 132 MMHG | OXYGEN SATURATION: 98 % | BODY MASS INDEX: 32.32 KG/M2 | WEIGHT: 160 LBS | RESPIRATION RATE: 16 BRPM

## 2020-01-06 DIAGNOSIS — N18.30 CKD (CHRONIC KIDNEY DISEASE) STAGE 3, GFR 30-59 ML/MIN (H): ICD-10-CM

## 2020-01-06 DIAGNOSIS — I10 HTN, GOAL BELOW 150/90: Primary | ICD-10-CM

## 2020-01-06 DIAGNOSIS — D64.9 ANEMIA, UNSPECIFIED TYPE: ICD-10-CM

## 2020-01-06 DIAGNOSIS — I35.0 AORTIC VALVE STENOSIS, ETIOLOGY OF CARDIAC VALVE DISEASE UNSPECIFIED: ICD-10-CM

## 2020-01-06 DIAGNOSIS — I10 ESSENTIAL HYPERTENSION: ICD-10-CM

## 2020-01-06 DIAGNOSIS — Z23 NEED FOR PROPHYLACTIC VACCINATION AND INOCULATION AGAINST INFLUENZA: ICD-10-CM

## 2020-01-06 LAB
ALBUMIN SERPL-MCNC: 3.3 G/DL (ref 3.4–5)
ALP SERPL-CCNC: 138 U/L (ref 40–150)
ALT SERPL W P-5'-P-CCNC: 8 U/L (ref 0–50)
ANION GAP SERPL CALCULATED.3IONS-SCNC: 7 MMOL/L (ref 3–14)
AST SERPL W P-5'-P-CCNC: 23 U/L (ref 0–45)
BILIRUB SERPL-MCNC: 0.4 MG/DL (ref 0.2–1.3)
BUN SERPL-MCNC: 19 MG/DL (ref 7–30)
CALCIUM SERPL-MCNC: 9.2 MG/DL (ref 8.5–10.1)
CHLORIDE SERPL-SCNC: 103 MMOL/L (ref 94–109)
CO2 SERPL-SCNC: 28 MMOL/L (ref 20–32)
CREAT SERPL-MCNC: 0.97 MG/DL (ref 0.52–1.04)
ERYTHROCYTE [DISTWIDTH] IN BLOOD BY AUTOMATED COUNT: 14 % (ref 10–15)
GFR SERPL CREATININE-BSD FRML MDRD: 52 ML/MIN/{1.73_M2}
GLUCOSE SERPL-MCNC: 99 MG/DL (ref 70–99)
HCT VFR BLD AUTO: 35.8 % (ref 35–47)
HGB BLD-MCNC: 12.1 G/DL (ref 11.7–15.7)
MCH RBC QN AUTO: 31.6 PG (ref 26.5–33)
MCHC RBC AUTO-ENTMCNC: 33.8 G/DL (ref 31.5–36.5)
MCV RBC AUTO: 94 FL (ref 78–100)
PLATELET # BLD AUTO: 323 10E9/L (ref 150–450)
POTASSIUM SERPL-SCNC: 4.1 MMOL/L (ref 3.4–5.3)
PROT SERPL-MCNC: 7.5 G/DL (ref 6.8–8.8)
RBC # BLD AUTO: 3.83 10E12/L (ref 3.8–5.2)
SODIUM SERPL-SCNC: 138 MMOL/L (ref 133–144)
WBC # BLD AUTO: 6.5 10E9/L (ref 4–11)

## 2020-01-06 PROCEDURE — 90662 IIV NO PRSV INCREASED AG IM: CPT | Performed by: FAMILY MEDICINE

## 2020-01-06 PROCEDURE — 99214 OFFICE O/P EST MOD 30 MIN: CPT | Mod: 25 | Performed by: FAMILY MEDICINE

## 2020-01-06 PROCEDURE — 85027 COMPLETE CBC AUTOMATED: CPT | Performed by: FAMILY MEDICINE

## 2020-01-06 PROCEDURE — G0008 ADMIN INFLUENZA VIRUS VAC: HCPCS | Performed by: FAMILY MEDICINE

## 2020-01-06 PROCEDURE — 80053 COMPREHEN METABOLIC PANEL: CPT | Performed by: FAMILY MEDICINE

## 2020-01-06 PROCEDURE — 36415 COLL VENOUS BLD VENIPUNCTURE: CPT | Performed by: FAMILY MEDICINE

## 2020-01-06 RX ORDER — CLONIDINE HYDROCHLORIDE 0.2 MG/1
0.2 TABLET ORAL 2 TIMES DAILY
Qty: 180 TABLET | Refills: 1 | Status: SHIPPED | OUTPATIENT
Start: 2020-01-06 | End: 2020-06-04

## 2020-01-06 RX ORDER — HYDRALAZINE HYDROCHLORIDE 50 MG/1
50 TABLET, FILM COATED ORAL 3 TIMES DAILY
Qty: 270 TABLET | Refills: 1 | Status: SHIPPED | OUTPATIENT
Start: 2020-01-06 | End: 2020-06-04

## 2020-01-06 ASSESSMENT — PAIN SCALES - GENERAL: PAINLEVEL: MILD PAIN (2)

## 2020-01-06 NOTE — PROGRESS NOTES
Subjective     Nina Villalpando is a 89 year old female who presents to clinic today for the following health issues:    HPI   Hypertension Follow-up      Do you check your blood pressure regularly outside of the clinic? No     Are you following a low salt diet? No  She just does not Add salt    Are your blood pressures ever more than 140 on the top number (systolic) OR more   than 90 on the bottom number (diastolic), for example 140/90? Yes      How many servings of fruits and vegetables do you eat daily?  2-3    On average, how many sweetened beverages do you drink each day (Examples: soda, juice, sweet tea, etc.  Do NOT count diet or artificially sweetened beverages)?   0    How many days per week do you miss taking your medication? 0    Recheck Hgb      Patient Active Problem List   Diagnosis     Primary osteoarthritis of both knees     Anemia, unspecified type     Varicose veins of legs     Essential hypertension     Chronic rhinitis     Hair loss     Health Care Home     Aortic stenosis     Hyperlipidemia, unspecified     CKD (chronic kidney disease) stage 3, GFR 30-59 ml/min (H)     Past Surgical History:   Procedure Laterality Date     C NONSPECIFIC PROCEDURE  1980    Hysterectomy due to menometrorrhagia.  Bladder repair.     CATARACT IOL, RT/LT  6/26/2008    left eye     CATARACT IOL, RT/LT  7/24/2008    right eye     COLONOSCOPY  04/16/2007     HERNIA REPAIR, UMBILICAL  09/27/2007    Incarcerated.     LASER YAG CAPSULOTOMY  5/22/2014    Procedure: LASER YAG CAPSULOTOMY;  Surgeon: Hebert Ledbetter MD;  Location:  OR       Social History     Tobacco Use     Smoking status: Never Smoker     Smokeless tobacco: Never Used     Tobacco comment: no smokers in household   Substance Use Topics     Alcohol use: Yes     Comment: marcelo/waterx1-2/x1-2 q couple of months     Family History   Problem Relation Age of Onset     Diabetes Maternal Grandmother      JACK. Sister      Heart Disease Brother       Diabetes Mother      C.A.D. Father      Heart Disease Brother      Heart Disease Brother      Heart Disease Sister      Respiratory Sister      Diabetes Daughter      Anesthesia Reaction No family hx of      Asthma No family hx of      Hypertension No family hx of      Breast Cancer No family hx of      Alcohol/Drug No family hx of      Alzheimer Disease No family hx of      Prostate Cancer No family hx of      Cancer - colorectal No family hx of      Cerebrovascular Disease No family hx of      Allergies No family hx of      Arthritis No family hx of      Cardiovascular No family hx of      Circulatory No family hx of      Depression No family hx of      Endocrine Disease No family hx of      Genetic Disorder No family hx of      Gastrointestinal Disease No family hx of      Genitourinary Problems No family hx of      Blood Disease No family hx of      Cancer No family hx of      Congenital Anomalies No family hx of      Connective Tissue Disorder No family hx of      Eye Disorder No family hx of      Gynecology No family hx of      Obesity No family hx of      Osteoporosis No family hx of      Psychotic Disorder No family hx of      Musculoskeletal Disorder No family hx of      Lipids No family hx of      Neurologic Disorder No family hx of      Thyroid Disease No family hx of      Hearing Loss No family hx of          Current Outpatient Medications   Medication Sig Dispense Refill     Blood Pressure Monitor KIT 1 Device daily Use to check your blood pressure every day. (Wrist monitor) 1 kit 0     cloNIDine (CATAPRES) 0.2 MG tablet Take 1 tablet (0.2 mg) by mouth 2 times daily 180 tablet 1     hydrALAZINE (APRESOLINE) 50 MG tablet Take 1 tablet (50 mg) by mouth 3 times daily 270 tablet 1     metoprolol succinate ER (TOPROL-XL) 100 MG 24 hr tablet TAKE 1 & 1/2 (ONE & ONE-HALF) TABLETS BY MOUTH AT BEDTIME 135 tablet 1     MULTI-DAY OR TABS   0     aspirin 81 MG tablet Take 1 tablet by mouth daily.       biotin 1000  MCG TABS tablet Take 1 tablet by mouth daily.       Cholecalciferol (VITAMIN D) 1000 UNIT capsule Take 1 capsule by mouth daily Not taking every day       COMPRESSION STOCKINGS Lower extremities compression stockings  Below the knees  Mild pressure 15-20 MMHG         (Patient not taking: Reported on 1/6/2020) 2 each 0     GLUCOSAMINE-CHONDROITIN OR TABS 1 tablet qd 0 0     order for DME Equipment being ordered: Blood pressure monitor 1 Device 0     VITAMIN E 400 UNIT OR CAPS 1 qd  0 0     Allergies   Allergen Reactions     No Known Drug Allergies      Recent Labs   Lab Test 01/06/20  1216 05/21/19  1229 11/26/18  1153  05/03/18  1235  03/21/17  1412 08/26/16  1018  05/22/13  1212  12/07/11  1014   A1C  --   --   --   --   --   --   --   --   --  5.9  --  5.9   LDL  --  96  --   --  70  --   --  47   < >  --   --   --    HDL  --  55  --   --  52  --   --  57   < >  --   --   --    TRIG  --  73  --   --  77  --   --  91   < >  --   --   --    ALT 8 11 13   < > 12   < > 16 14   < >  --   --   --    CR 0.97 0.97 0.92   < > 0.95   < > 0.82 0.97   < > 0.88   < >  --    GFRESTIMATED 52* 52* 58*   < > 55*   < > 66 54*   < > 62   < >  --    GFRESTBLACK 60* 60* 70   < > 67   < > 80 66   < > 74   < >  --    POTASSIUM 4.1 4.5 4.1   < > 4.1   < > 4.0 4.5   < > 4.2   < >  --    TSH  --   --   --   --  2.17  --  3.22  --    < >  --    < >  --     < > = values in this interval not displayed.      BP Readings from Last 3 Encounters:   01/06/20 132/66   05/21/19 138/80   11/26/18 142/66    Wt Readings from Last 3 Encounters:   01/06/20 72.6 kg (160 lb)   05/28/19 74.4 kg (164 lb)   05/21/19 74.4 kg (164 lb)                    Reviewed and updated as needed this visit by Provider  Tobacco  Allergies  Meds  Problems  Med Hx  Surg Hx  Fam Hx         Review of Systems   ROS COMP: Constitutional, HEENT, cardiovascular, pulmonary, GI, , musculoskeletal, neuro, skin, endocrine and psych systems are negative, except as otherwise  noted.      Objective    /66   Pulse 60   Temp 97.7  F (36.5  C)   Resp 16   Wt 72.6 kg (160 lb)   SpO2 98%   BMI 32.32 kg/m    Body mass index is 32.32 kg/m .  Physical Exam  Constitutional:       Appearance: Normal appearance.   HENT:      Head: Normocephalic and atraumatic.      Right Ear: Tympanic membrane, ear canal and external ear normal. There is no impacted cerumen.      Left Ear: Tympanic membrane, ear canal and external ear normal. There is no impacted cerumen.      Nose: Nose normal.   Neck:      Musculoskeletal: Normal range of motion and neck supple.   Cardiovascular:      Rate and Rhythm: Normal rate and regular rhythm.      Pulses: Normal pulses.      Heart sounds: Normal heart sounds. No murmur. No friction rub. No gallop.    Pulmonary:      Effort: Pulmonary effort is normal. No respiratory distress.      Breath sounds: Normal breath sounds. No stridor. No wheezing, rhonchi or rales.   Chest:      Chest wall: No tenderness.   Neurological:      General: No focal deficit present.      Mental Status: She is alert and oriented to person, place, and time.   Psychiatric:         Mood and Affect: Mood normal.         Behavior: Behavior normal.         Thought Content: Thought content normal.         Judgment: Judgment normal.            Diagnostic Test Results:  Labs reviewed in Epic        Assessment & Plan   Problem List Items Addressed This Visit     Anemia, unspecified type     Recheck hemoglobin shows stable levels. No recent episodes of dark stools or blood in stool         Relevant Orders    CBC with platelets (Completed)    Essential hypertension     Blood pressures are well controlled on the current regimen of hydralazine and metoprolol and clonidine.  No side effects.  Continue current regimen.  Recheck chemistries today.  Refill meds.  Follow-up in 6 months         Relevant Medications    hydrALAZINE (APRESOLINE) 50 MG tablet    cloNIDine (CATAPRES) 0.2 MG tablet    Aortic stenosis  "    Continues to be asymptomatic.  We will continue to monitor for now.         CKD (chronic kidney disease) stage 3, GFR 30-59 ml/min (H)     Recheck chemistries.  Advised to avoid over-the-counter nephrotoxic medications.           Relevant Orders    CBC with platelets (Completed)      Other Visit Diagnoses     HTN, goal below 150/90    -  Primary    Relevant Medications    hydrALAZINE (APRESOLINE) 50 MG tablet    cloNIDine (CATAPRES) 0.2 MG tablet    Other Relevant Orders    Comprehensive metabolic panel (Completed)    Need for prophylactic vaccination and inoculation against influenza        Relevant Orders    INFLUENZA (HIGH DOSE) 3 VALENT VACCINE [03963] (Completed)    ADMIN INFLUENZA (For MEDICARE Patients ONLY) [] (Completed)             BMI:   Estimated body mass index is 32.32 kg/m  as calculated from the following:    Height as of 5/28/19: 1.499 m (4' 11\").    Weight as of this encounter: 72.6 kg (160 lb).           See Patient Instructions  Return in about 6 months (around 7/6/2020).    Kyra Dangelo MD  [facility]    "

## 2020-01-09 NOTE — ASSESSMENT & PLAN NOTE
Blood pressures are well controlled on the current regimen of hydralazine and metoprolol and clonidine.  No side effects.  Continue current regimen.  Recheck chemistries today.  Refill meds.  Follow-up in 6 months

## 2020-03-09 ENCOUNTER — HOSPITAL ENCOUNTER (EMERGENCY)
Facility: CLINIC | Age: 85
Discharge: HOME | End: 2020-03-09
Attending: EMERGENCY MEDICINE | Admitting: EMERGENCY MEDICINE
Payer: COMMERCIAL

## 2020-03-09 ENCOUNTER — TELEPHONE (OUTPATIENT)
Dept: FAMILY MEDICINE | Facility: OTHER | Age: 85
End: 2020-03-09

## 2020-03-09 ENCOUNTER — APPOINTMENT (OUTPATIENT)
Dept: ULTRASOUND IMAGING | Facility: CLINIC | Age: 85
End: 2020-03-09
Attending: EMERGENCY MEDICINE
Payer: COMMERCIAL

## 2020-03-09 VITALS
TEMPERATURE: 97.2 F | DIASTOLIC BLOOD PRESSURE: 76 MMHG | HEART RATE: 52 BPM | SYSTOLIC BLOOD PRESSURE: 151 MMHG | OXYGEN SATURATION: 97 % | RESPIRATION RATE: 18 BRPM

## 2020-03-09 DIAGNOSIS — R60.0 PERIPHERAL EDEMA: ICD-10-CM

## 2020-03-09 DIAGNOSIS — M71.20 SYNOVIAL CYST OF POPLITEAL SPACE, UNSPECIFIED LATERALITY: ICD-10-CM

## 2020-03-09 PROCEDURE — 99283 EMERGENCY DEPT VISIT LOW MDM: CPT | Mod: Z6 | Performed by: EMERGENCY MEDICINE

## 2020-03-09 PROCEDURE — 93971 EXTREMITY STUDY: CPT | Mod: LT

## 2020-03-09 PROCEDURE — 99284 EMERGENCY DEPT VISIT MOD MDM: CPT | Mod: 25 | Performed by: EMERGENCY MEDICINE

## 2020-03-09 NOTE — TELEPHONE ENCOUNTER
I spoke with the pt's daughter, Shelby (C2C on file). She states pt has swollen leg and it is oozing liquid. The left leg is more swollen then the right. Thinks it started on Saturday. Daughter has not looked under the gauze. Per pt it has been itching and feels like there is a rash. The skin is shinny and purple red in color.  Daughter will take her to ED.    Debra Rios, MSN, RN

## 2020-03-09 NOTE — ED AVS SNAPSHOT
[facility] Emergency Department  [address] [place] [address]  Phone:  [phone]  Fax:  [phone]                                    Nina Villalpando   MRN: [id number]    Department:  [facility] Emergency Department   Date of Visit:  3/9/2020           After Visit Summary Signature Page    I have received my discharge instructions, and my questions have been answered. I have discussed any challenges I see with this plan with the nurse or doctor.    ..........................................................................................................................................  Patient/Patient Representative Signature      ..........................................................................................................................................  Patient Representative Print Name and Relationship to Patient    ..................................................               ................................................  Date                                   Time    ..........................................................................................................................................  Reviewed by Signature/Title    ...................................................              ..............................................  Date                                               Time          22EPIC Rev 08/18

## 2020-03-09 NOTE — ED PROVIDER NOTES
History     Chief Complaint   Patient presents with     Leg Swelling     HPI  Nina Villalpando is a 89 year old female who has chronic bilateral lower extremity edema and bumped her left shin and had some watery substance leaking from her shin.  She does not have new calf pain or upper leg pain.  She sits in a chair with her legs dangling below for 18 hours a day.  She watches a lot of TV.  She is not very active.    Allergies:  Allergies   Allergen Reactions     No Known Drug Allergies        Problem List:    Patient Active Problem List    Diagnosis Date Noted     CKD (chronic kidney disease) stage 3, GFR 30-59 ml/min (H) 05/24/2019     Priority: Medium     Hyperlipidemia, unspecified 01/22/2014     Priority: Medium     Diagnosis updated by automated process. Provider to review and confirm.       Aortic stenosis 11/01/2013     Priority: Medium     Health Care Home 12/28/2012     Priority: Medium     Oriana Yi RN-PHN  FPA / [facility]   [phone]    DX V65.8 REPLACED WITH 87611 HEALTH CARE HOME (04/08/2013)       Hair loss 09/13/2012     Priority: Medium     Chronic rhinitis 04/17/2012     Priority: Medium     Essential hypertension 01/04/2012     Priority: Medium     Varicose veins of legs 11/29/2011     Priority: Medium     Anemia, unspecified type 12/08/2010     Priority: Medium     Primary osteoarthritis of both knees      Priority: Medium        Past Medical History:    Past Medical History:   Diagnosis Date     Carpal tunnel syndrome      Diverticulitis of colon (without mention of hemorrhage)(562.11)      Generalized osteoarthrosis, unspecified site      Hernia of unspecified site of abdominal cavity without mention of obstruction or gangrene      Other and unspecified hyperlipidemia      Unspecified essential hypertension      Unspecified sinusitis (chronic)        Past Surgical History:    Past Surgical History:   Procedure Laterality Date     C NONSPECIFIC PROCEDURE   1980    Hysterectomy due to menometrorrhagia.  Bladder repair.     CATARACT IOL, RT/LT  6/26/2008    left eye     CATARACT IOL, RT/LT  7/24/2008    right eye     COLONOSCOPY  04/16/2007     HERNIA REPAIR, UMBILICAL  09/27/2007    Incarcerated.     LASER YAG CAPSULOTOMY  5/22/2014    Procedure: LASER YAG CAPSULOTOMY;  Surgeon: Hebert Ledbetter MD;  Location: [facility] OR       Family History:    Family History   Problem Relation Age of Onset     Diabetes Maternal Grandmother      C.A.D. Sister      Heart Disease Brother      Diabetes Mother      C.A.D. Father      Heart Disease Brother      Heart Disease Brother      Heart Disease Sister      Respiratory Sister      Diabetes Daughter      Anesthesia Reaction No family hx of      Asthma No family hx of      Hypertension No family hx of      Breast Cancer No family hx of      Alcohol/Drug No family hx of      Alzheimer Disease No family hx of      Prostate Cancer No family hx of      Cancer - colorectal No family hx of      Cerebrovascular Disease No family hx of      Allergies No family hx of      Arthritis No family hx of      Cardiovascular No family hx of      Circulatory No family hx of      Depression No family hx of      Endocrine Disease No family hx of      Genetic Disorder No family hx of      Gastrointestinal Disease No family hx of      Genitourinary Problems No family hx of      Blood Disease No family hx of      Cancer No family hx of      Congenital Anomalies No family hx of      Connective Tissue Disorder No family hx of      Eye Disorder No family hx of      Gynecology No family hx of      Obesity No family hx of      Osteoporosis No family hx of      Psychotic Disorder No family hx of      Musculoskeletal Disorder No family hx of      Lipids No family hx of      Neurologic Disorder No family hx of      Thyroid Disease No family hx of      Hearing Loss No family hx of        Social History:  Marital Status:   [5]  Social History     Tobacco Use      Smoking status: Never Smoker     Smokeless tobacco: Never Used     Tobacco comment: no smokers in household   Substance Use Topics     Alcohol use: Yes     Comment: marcelo/waterx1-2/x1-2 q couple of months     Drug use: No        Medications:    aspirin 81 MG tablet  biotin 1000 MCG TABS tablet  Blood Pressure Monitor KIT  Cholecalciferol (VITAMIN D) 1000 UNIT capsule  cloNIDine (CATAPRES) 0.2 MG tablet  COMPRESSION STOCKINGS  GLUCOSAMINE-CHONDROITIN OR TABS  hydrALAZINE (APRESOLINE) 50 MG tablet  metoprolol succinate ER (TOPROL-XL) 100 MG 24 hr tablet  MULTI-DAY OR TABS  order for DME  VITAMIN E 400 UNIT OR CAPS          Review of Systems   All other systems reviewed and are negative.      Physical Exam   BP: (!) 140/94  Pulse: 52  Heart Rate: 58  Temp: 97.2  F (36.2  C)  Resp: 18  SpO2: 97 %      Physical Exam  Vitals signs and nursing note reviewed.   Constitutional:       General: She is not in acute distress.     Appearance: She is well-developed. She is not diaphoretic.   HENT:      Head: Normocephalic and atraumatic.   Eyes:      General: No scleral icterus.  Neck:      Musculoskeletal: Normal range of motion and neck supple.   Musculoskeletal:      Right lower leg: Edema present.      Left lower leg: Edema present.      Comments: No calf tenderness or upper thigh tenderness.  Thin skin with pitting edema to the upper shins bilaterally.  No erythema or wound drainage.  No liquid substance coming from the leg.   Skin:     General: Skin is warm and dry.      Coloration: Skin is not pale.      Findings: No erythema or rash.   Neurological:      Mental Status: She is alert and oriented to person, place, and time.         ED Course        Procedures                 Results for orders placed or performed during the hospital encounter of 03/09/20 (from the past 24 hour(s))   US Lower Extremity Venous Duplex Left    Narrative    ULTRASOUND  LOWER EXTREMITY VENOUS DUPLEX LEFT 3/9/2020 3:19 PM    CLINICAL HISTORY:  Leg pain and swelling.    TECHNIQUE: Venous Duplex ultrasound of the left lower extremity with  and without compression, augmentation and duplex. Color flow and  spectral Doppler with waveform analysis performed.    COMPARISON: None.    FINDINGS: Exam includes the common femoral, femoral, popliteal, and  contralateral common femoral veins as well as segmentally visualized  deep calf veins and greater saphenous vein.     LEFT: No deep vein thrombosis. No superficial thrombophlebitis. There  is a 4.1 x 1.6 x 4.5 cm Baker's cyst.    No specific finding in the region of swelling at the left ankle.      Impression    IMPRESSION:  1.  No deep venous thrombosis in the left lower extremity.  2.  Baker's cyst is noted.    ZAINA VAZQUEZ MD       Medications - No data to display    Assessments & Plan (with Medical Decision Making)  Peripheral edema with reported weeping that has stopped.  This may be secondary to recent bump of the skin.  I think this is just leaking peripheral edema.  Her last echocardiogram showed a normal ejection fraction.  She does not have new symptoms of congestive heart failure but she is not active.  I think most of this is dependent edema.  Of advised her to elevate her legs as much as possible and get a little more activity.  Ultrasound showed no DVT.  Return to ER precautions discussed.  Incidental finding of Baker's cyst noted.     I have reviewed the nursing notes.    I have reviewed the findings, diagnosis, plan and need for follow up with the patient.      Discharge Medication List as of 3/9/2020  4:01 PM          Final diagnoses:   Peripheral edema   Synovial cyst of popliteal space, unspecified laterality       3/9/2020   [facility] [place] EMERGENCY DEPARTMENT     Andrade Sherwood MD  03/09/20 [id number]

## 2020-03-09 NOTE — DISCHARGE INSTRUCTIONS
Return if new or worsening symptoms. Elevate your legs whenever sitting.  Try to do more walking. Your ultrasound showed a bakers cyst but no blood clot.    [phone]

## 2020-06-02 DIAGNOSIS — I10 HTN, GOAL BELOW 150/90: ICD-10-CM

## 2020-06-03 NOTE — TELEPHONE ENCOUNTER
Pending Prescriptions:                       Disp   Refills    metoprolol succinate ER (TOPROL-XL) 100 MG*135 ta*0        Sig: TAKE 1 & 1/2 (ONE & ONE-HALF) TABLETS BY MOUTH AT           BEDTIME    BP Readings from Last 3 Encounters:   03/09/20 (!) 151/76   01/06/20 132/66   05/21/19 138/80       Routing refill request to provider for review/approval because:  Labs out of range:  B/P    Debra Rios, MSN, RN

## 2020-06-04 ENCOUNTER — VIRTUAL VISIT (OUTPATIENT)
Dept: FAMILY MEDICINE | Facility: OTHER | Age: 85
End: 2020-06-04
Payer: COMMERCIAL

## 2020-06-04 DIAGNOSIS — I10 HTN, GOAL BELOW 150/90: ICD-10-CM

## 2020-06-04 DIAGNOSIS — Z00.01 ENCOUNTER FOR ROUTINE ADULT MEDICAL EXAM WITH ABNORMAL FINDINGS: Primary | ICD-10-CM

## 2020-06-04 PROCEDURE — G0439 PPPS, SUBSEQ VISIT: HCPCS | Mod: 95 | Performed by: FAMILY MEDICINE

## 2020-06-04 RX ORDER — METOPROLOL SUCCINATE 100 MG/1
TABLET, EXTENDED RELEASE ORAL
Qty: 135 TABLET | Refills: 1 | Status: SHIPPED | OUTPATIENT
Start: 2020-06-04 | End: 2021-01-18

## 2020-06-04 RX ORDER — METOPROLOL SUCCINATE 100 MG/1
TABLET, EXTENDED RELEASE ORAL
Qty: 135 TABLET | Refills: 0 | OUTPATIENT
Start: 2020-06-04

## 2020-06-04 RX ORDER — CLONIDINE HYDROCHLORIDE 0.2 MG/1
0.2 TABLET ORAL 2 TIMES DAILY
Qty: 180 TABLET | Refills: 1 | Status: SHIPPED | OUTPATIENT
Start: 2020-06-04 | End: 2020-11-03

## 2020-06-04 RX ORDER — HYDRALAZINE HYDROCHLORIDE 50 MG/1
50 TABLET, FILM COATED ORAL 3 TIMES DAILY
Qty: 270 TABLET | Refills: 1 | Status: SHIPPED | OUTPATIENT
Start: 2020-06-04 | End: 2020-11-03

## 2020-06-04 NOTE — PROGRESS NOTES
"Nina Villalpando is a 89 year old female who is being evaluated via a billable telephone visit.      The patient has been notified of following:     \"This telephone visit will be conducted via a call between you and your physician/provider. We have found that certain health care needs can be provided without the need for a physical exam.  This service lets us provide the care you need with a short phone conversation.  If a prescription is necessary we can send it directly to your pharmacy.  If lab work is needed we can place an order for that and you can then stop by our lab to have the test done at a later time.    Telephone visits are billed at different rates depending on your insurance coverage. During this emergency period, for some insurers they may be billed the same as an in-person visit.  Please reach out to your insurance provider with any questions.    If during the course of the call the physician/provider feels a telephone visit is not appropriate, you will not be charged for this service.\"    Patient has given verbal consent for Telephone visit?  Yes    What phone number would you like to be contacted at? [phone]    How would you like to obtain your AVS? Mail a copy    Subjective     Nina Villalpando is a 89 year old female who presents via phone visit today for the following health issues:    HPI  Hypertension Follow-up      Do you check your blood pressure regularly outside of the clinic? No     Are you following a low salt diet? Yes    Are your blood pressures ever more than 140 on the top number (systolic) OR more   than 90 on the bottom number (diastolic), for example 140/90? No      How many servings of fruits and vegetables do you eat daily?  2-3    On average, how many sweetened beverages do you drink each day (Examples: soda, juice, sweet tea, etc.  Do NOT count diet or artificially sweetened beverages)?   0    How many days per week do you exercise enough to make your heart beat " "faster? 3 or less    How many minutes a day do you exercise enough to make your heart beat faster? 10 - 19    How many days per week do you miss taking your medication? 0      Annual Wellness Visit    Are you in the first 12 months of your Medicare Part B coverage?  No    Physical Health:    In general, how would you rate your overall physical health? good    Outside of work, how many days during the week do you exercise?none    Outside of work, approximately how many minutes a day do you exercise?not applicable    If you drink alcohol do you typically have >3 drinks per day or >7 drinks per week? No    Do you usually eat at least 4 servings of fruit and vegetables a day, include whole grains & fiber and avoid regularly eating high fat or \"junk\" foods? Yes    Do you have any problems taking medications regularly? No    Do you have any side effects from medications? none    Needs assistance for the following daily activities: no assistance needed    Which of the following safety concerns are present in your home?  none identified     Hearing impairment: No    In the past 6 months, have you been bothered by leaking of urine? no    There were no vitals taken for this visit.  Weight: Unable to obtain due to video visit  Height: Unable to obtain due to video visit  BMI:  Blood Pressure:   Mental Health:    In general, how would you rate your overall mental or emotional health? good  PHQ-2 Score:  0    Do you feel safe in your environment? Yes    Have you ever done Advance Care Planning? (For example, a Health Directive, POLST, or a discussion with a medical provider or your loved ones about your wishes)? No, advance care planning information given to patient to review.  Patient declined advance care planning discussion at this time.    Fall risk:  Fallen 2 or more times in the past year?: Yes  Any fall with injury in the past year?: No    Cognitive Screenin) Repeat 3 items (Leader, Season, Table)    2) Clock draw: "   3) 3 item recall: Recalls 1 object   Results:  1-2 items recalled: PROBABLE COGNITIVE IMPAIRMENT  Mini-CogTM Copyright S Viraj. Licensed by the author for use in [facility]; reprinted with permission (janet@[facility]). All rights reserved.      Do you have sleep apnea, excessive snoring or daytime drowsiness?: no    Current providers sharing in care for this patient include:   Patient Care Team:  Kyra Dangelo MD as PCP - General (Family Practice)  Kyra Dangelo MD as Assigned PCP  Care, [facility] ([facility]          Patient Active Problem List   Diagnosis     Primary osteoarthritis of both knees     Anemia, unspecified type     Varicose veins of legs     Essential hypertension     Chronic rhinitis     Hair loss     Health Care Home     Aortic stenosis     Hyperlipidemia, unspecified     CKD (chronic kidney disease) stage 3, GFR 30-59 ml/min (H)     Past Surgical History:   Procedure Laterality Date     C NONSPECIFIC PROCEDURE  1980    Hysterectomy due to menometrorrhagia.  Bladder repair.     CATARACT IOL, RT/LT  6/26/2008    left eye     CATARACT IOL, RT/LT  7/24/2008    right eye     COLONOSCOPY  04/16/2007     HERNIA REPAIR, UMBILICAL  09/27/2007    Incarcerated.     LASER YAG CAPSULOTOMY  5/22/2014    Procedure: LASER YAG CAPSULOTOMY;  Surgeon: Hebert Ledbetter MD;  Location:  OR       Social History     Tobacco Use     Smoking status: Never Smoker     Smokeless tobacco: Never Used     Tobacco comment: no smokers in household   Substance Use Topics     Alcohol use: Yes     Comment: marcelo/waterx1-2/x1-2 q couple of months     Family History   Problem Relation Age of Onset     Diabetes Maternal Grandmother      C.A.D. Sister      Heart Disease Brother      Diabetes Mother      C.A.D. Father      Heart Disease Brother      Heart Disease Brother      Heart Disease Sister      Respiratory Sister      Diabetes Daughter      Anesthesia Reaction No family  hx of      Asthma No family hx of      Hypertension No family hx of      Breast Cancer No family hx of      Alcohol/Drug No family hx of      Alzheimer Disease No family hx of      Prostate Cancer No family hx of      Cancer - colorectal No family hx of      Cerebrovascular Disease No family hx of      Allergies No family hx of      Arthritis No family hx of      Cardiovascular No family hx of      Circulatory No family hx of      Depression No family hx of      Endocrine Disease No family hx of      Genetic Disorder No family hx of      Gastrointestinal Disease No family hx of      Genitourinary Problems No family hx of      Blood Disease No family hx of      Cancer No family hx of      Congenital Anomalies No family hx of      Connective Tissue Disorder No family hx of      Eye Disorder No family hx of      Gynecology No family hx of      Obesity No family hx of      Osteoporosis No family hx of      Psychotic Disorder No family hx of      Musculoskeletal Disorder No family hx of      Lipids No family hx of      Neurologic Disorder No family hx of      Thyroid Disease No family hx of      Hearing Loss No family hx of          Current Outpatient Medications   Medication Sig Dispense Refill     biotin 1000 MCG TABS tablet Take 1 tablet by mouth daily.       Blood Pressure Monitor KIT 1 Device daily Use to check your blood pressure every day. (Wrist monitor) 1 kit 0     Cholecalciferol (VITAMIN D) 1000 UNIT capsule Take 1 capsule by mouth daily Not taking every day       cloNIDine (CATAPRES) 0.2 MG tablet Take 1 tablet (0.2 mg) by mouth 2 times daily 180 tablet 1     COMPRESSION STOCKINGS Lower extremities compression stockings  Below the knees  Mild pressure 15-20 MMHG         2 each 0     GLUCOSAMINE-CHONDROITIN OR TABS 1 tablet qd 0 0     hydrALAZINE (APRESOLINE) 50 MG tablet Take 1 tablet (50 mg) by mouth 3 times daily 270 tablet 1     metoprolol succinate ER (TOPROL-XL) 100 MG 24 hr tablet TAKE 1 & 1/2 (ONE &  ONE-HALF) TABLETS BY MOUTH AT BEDTIME 135 tablet 1     MULTI-DAY OR TABS   0     order for DME Equipment being ordered: Blood pressure monitor 1 Device 0     VITAMIN E 400 UNIT OR CAPS 1 qd  0 0     aspirin 81 MG tablet Take 1 tablet by mouth daily.       Allergies   Allergen Reactions     No Known Drug Allergies      BP Readings from Last 3 Encounters:   03/09/20 (!) 151/76   01/06/20 132/66   05/21/19 138/80    Wt Readings from Last 3 Encounters:   01/06/20 72.6 kg (160 lb)   05/28/19 74.4 kg (164 lb)   05/21/19 74.4 kg (164 lb)                    Reviewed and updated as needed this visit by Provider         Review of Systems   Constitutional, HEENT, cardiovascular, pulmonary, GI, , musculoskeletal, neuro, skin, endocrine and psych systems are negative, except as otherwise noted.       Objective   Reported vitals:  There were no vitals taken for this visit.   healthy, alert and no distress  PSYCH: Alert and oriented times 3; coherent speech, normal   rate and volume, able to articulate logical thoughts, able   to abstract reason, no tangential thoughts, no hallucinations   or delusions  Her affect is normal  RESP: No cough, no audible wheezing, able to talk in full sentences  Remainder of exam unable to be completed due to telephone visits    Diagnostic Test Results:  Labs reviewed in Epic        Assessment/Plan:  1. HTN, goal below 150/90  Reports well controlled blood pressures at home. Will have her come in  - metoprolol succinate ER (TOPROL-XL) 100 MG 24 hr tablet; TAKE 1 & 1/2 (ONE & ONE-HALF) TABLETS BY MOUTH AT BEDTIME  Dispense: 135 tablet; Refill: 1  - hydrALAZINE (APRESOLINE) 50 MG tablet; Take 1 tablet (50 mg) by mouth 3 times daily  Dispense: 270 tablet; Refill: 1  - cloNIDine (CATAPRES) 0.2 MG tablet; Take 1 tablet (0.2 mg) by mouth 2 times daily  Dispense: 180 tablet; Refill: 1    Declined vaccines    No follow-ups on file.      Phone call duration:  6:46 minutes    Kyra Dangelo MD

## 2020-06-09 ENCOUNTER — TELEPHONE (OUTPATIENT)
Dept: FAMILY MEDICINE | Facility: OTHER | Age: 85
End: 2020-06-09

## 2020-06-09 DIAGNOSIS — I10 HTN, GOAL BELOW 150/90: ICD-10-CM

## 2020-06-09 LAB
ANION GAP SERPL CALCULATED.3IONS-SCNC: 7 MMOL/L (ref 3–14)
BUN SERPL-MCNC: 25 MG/DL (ref 7–30)
CALCIUM SERPL-MCNC: 9 MG/DL (ref 8.5–10.1)
CHLORIDE SERPL-SCNC: 103 MMOL/L (ref 94–109)
CHOLEST SERPL-MCNC: 147 MG/DL
CO2 SERPL-SCNC: 25 MMOL/L (ref 20–32)
CREAT SERPL-MCNC: 1.1 MG/DL (ref 0.52–1.04)
GFR SERPL CREATININE-BSD FRML MDRD: 44 ML/MIN/{1.73_M2}
GLUCOSE SERPL-MCNC: 108 MG/DL (ref 70–99)
HDLC SERPL-MCNC: 56 MG/DL
LDLC SERPL CALC-MCNC: 74 MG/DL
NONHDLC SERPL-MCNC: 91 MG/DL
POTASSIUM SERPL-SCNC: 4.3 MMOL/L (ref 3.4–5.3)
SODIUM SERPL-SCNC: 135 MMOL/L (ref 133–144)
TRIGL SERPL-MCNC: 83 MG/DL

## 2020-06-09 PROCEDURE — 36415 COLL VENOUS BLD VENIPUNCTURE: CPT | Performed by: FAMILY MEDICINE

## 2020-06-09 PROCEDURE — 80048 BASIC METABOLIC PNL TOTAL CA: CPT | Performed by: FAMILY MEDICINE

## 2020-06-09 PROCEDURE — 80061 LIPID PANEL: CPT | Performed by: FAMILY MEDICINE

## 2020-06-09 NOTE — TELEPHONE ENCOUNTER
Attempted to contact patient - no answer/no VM (phone just rang continuously.) Will try again later.  Radha Castro, CMA

## 2020-06-09 NOTE — TELEPHONE ENCOUNTER
----- Message from Kyra Dangelo MD sent at 6/9/2020  3:56 PM CDT -----  Labs show normal cholesterol levels. Kidney function is mildly reduced compared to last test. I would recommend better BP control to prevent kidney damage. pleae advise follow up on BP in 3 months

## 2020-11-03 ENCOUNTER — TELEPHONE (OUTPATIENT)
Dept: FAMILY MEDICINE | Facility: OTHER | Age: 85
End: 2020-11-03

## 2020-11-03 ENCOUNTER — OFFICE VISIT (OUTPATIENT)
Dept: FAMILY MEDICINE | Facility: OTHER | Age: 85
End: 2020-11-03
Payer: COMMERCIAL

## 2020-11-03 VITALS
HEART RATE: 59 BPM | TEMPERATURE: 97.9 F | SYSTOLIC BLOOD PRESSURE: 124 MMHG | RESPIRATION RATE: 16 BRPM | DIASTOLIC BLOOD PRESSURE: 74 MMHG | OXYGEN SATURATION: 97 %

## 2020-11-03 DIAGNOSIS — Z23 NEED FOR VACCINATION: Primary | ICD-10-CM

## 2020-11-03 DIAGNOSIS — Z23 NEED FOR PROPHYLACTIC VACCINATION AND INOCULATION AGAINST INFLUENZA: ICD-10-CM

## 2020-11-03 DIAGNOSIS — I10 HTN, GOAL BELOW 150/90: ICD-10-CM

## 2020-11-03 DIAGNOSIS — R42 DYSEQUILIBRIUM: ICD-10-CM

## 2020-11-03 DIAGNOSIS — M17.12 PRIMARY OSTEOARTHRITIS OF LEFT KNEE: ICD-10-CM
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
## 2020-11-03 PROCEDURE — 99214 OFFICE O/P EST MOD 30 MIN: CPT | Mod: 25 | Performed by: FAMILY MEDICINE

## 2020-11-03 PROCEDURE — G0008 ADMIN INFLUENZA VIRUS VAC: HCPCS | Performed by: FAMILY MEDICINE

## 2020-11-03 PROCEDURE — 90472 IMMUNIZATION ADMIN EACH ADD: CPT | Mod: GY | Performed by: FAMILY MEDICINE

## 2020-11-03 PROCEDURE — 90715 TDAP VACCINE 7 YRS/> IM: CPT | Performed by: FAMILY MEDICINE

## 2020-11-03 PROCEDURE — 90662 IIV NO PRSV INCREASED AG IM: CPT | Performed by: FAMILY MEDICINE

## 2020-11-03 RX ORDER — CLONIDINE HYDROCHLORIDE 0.1 MG/1
0.1 TABLET ORAL 2 TIMES DAILY
Qty: 60 TABLET | Refills: 0 | Status: SHIPPED | OUTPATIENT
Start: 2020-11-03 | End: 2022-01-01

## 2020-11-03 RX ORDER — HYDRALAZINE HYDROCHLORIDE 50 MG/1
50 TABLET, FILM COATED ORAL 2 TIMES DAILY
Qty: 270 TABLET | Refills: 1
Start: 2020-11-03 | End: 2022-01-01

## 2020-11-03 NOTE — TELEPHONE ENCOUNTER
Reason for call:  Patient reporting a symptom    Symptom or request:   Patient fell on Sunday 11-01-20 ( 2 days ago) and hit her head on the table.  She was not treated or seen for this.  She would like to come in today to get checked as she is having some bruising on her face.  She is scheduled for 4:20 today with Dr Dangelo,   she has to see if her daughter can bring her.  Please triage. Thank you    Duration (how long have symptoms been present): 2 days    Have you been treated for this before? No    Additional comments: none    Phone Number patient can be reached at:  Home number on file [phone] (home)    Best Time:  any    Can we leave a detailed message on this number:  YES    Call taken on 11/3/2020 at 11:54 AM by Rosemarie Sherman

## 2020-11-03 NOTE — TELEPHONE ENCOUNTER
Spoke with patient. Has good bruising from hairline to check on the left cheek.  Tripped from the chair at the kitchen table. Her head hit the table and then the floor.   No LOC. No open wound.  Big bump that has gone down some already.  No dizziness.  Eyes seem more watery today.  Does have some black and blue around the eye as well.  Can see normally. Sore overall. But still moving around. Would like RK to see it and let her know everything is ok.    Will keep appointment for this afternoon.    Kwabena Flores, NESTORN, RN, PHN

## 2020-11-03 NOTE — PROGRESS NOTES
Subjective     Nina Villalpando is a 89 year old female who presents to clinic today for the following health issues:    HPI         Concern - fell hit head  Onset: 2 days ago  Description: pt states she does not remember tripping or feeling dizzy  Intensity: moderate  Progression of Symptoms:  same  Accompanying Signs & Symptoms: hit head on table and floor does not recall being unconscious. Hurt lower back  Previous history of similar problem: no  Precipitating factors:        Worsened by: moving and walking  Alleviating factors:        Improved by: resting  Therapies tried and outcome:  none       Review of Systems   Constitutional, HEENT, cardiovascular, pulmonary, GI, , musculoskeletal, neuro, skin, endocrine and psych systems are negative, except as otherwise noted.      Objective    /74   Pulse 59   Temp 97.9  F (36.6  C) (Temporal)   Resp 16   SpO2 97%   There is no height or weight on file to calculate BMI.  Physical Exam  Constitutional:       Appearance: Normal appearance.   HENT:      Head: Raccoon eyes and contusion present.      Jaw: There is normal jaw occlusion.        Right Ear: Tympanic membrane normal.      Left Ear: Tympanic membrane normal.      Nose: Nose normal.   Neck:      Musculoskeletal: Normal range of motion and neck supple.   Cardiovascular:      Rate and Rhythm: Normal rate and regular rhythm.      Pulses: Normal pulses.      Heart sounds: Normal heart sounds.   Pulmonary:      Effort: Pulmonary effort is normal. No respiratory distress.      Breath sounds: Normal breath sounds. No stridor. No wheezing, rhonchi or rales.   Chest:      Chest wall: No tenderness.   Abdominal:      General: Bowel sounds are normal.   Neurological:      General: No focal deficit present.      Mental Status: She is alert and oriented to person, place, and time. Mental status is at baseline.      Cranial Nerves: No cranial nerve deficit.      Sensory: No sensory deficit.      Motor: No  weakness.      Coordination: Coordination normal.      Gait: Gait abnormal.      Deep Tendon Reflexes: Reflexes normal.   Psychiatric:         Mood and Affect: Mood normal.         Behavior: Behavior normal.         Thought Content: Thought content normal.         Judgment: Judgment normal.      1. HTN, goal below 150/90  - Will cut back on the dose of Clonidine to 01.mg BID to reduce risk of orthostatic hypotension. She is already taking only 2 dose of hydralazine per day. Advised follow up in 1 month  - cloNIDine (CATAPRES) 0.1 MG tablet; Take 1 tablet (0.1 mg) by mouth 2 times daily  Dispense: 60 tablet; Refill: 0  - hydrALAZINE (APRESOLINE) 50 MG tablet; Take 1 tablet (50 mg) by mouth 2 times daily  Dispense: 270 tablet; Refill: 1    2. Need for vaccination  - TDAP VACCINE (Adacel, Boostrix)  [[id number]]    3. Fall, initial encounter  Gait abnormality due to arthritis. Advised using a cane to prevent future falls. Has a hematoma on the left side of the frontal bone.    - Cane Order for DME - ONLY FOR DME    4. Primary osteoarthritis of left knee  - Cane Order for DME - ONLY FOR DME    5. Need for prophylactic vaccination and inoculation against influenza  - FLUZONE HIGH DOSE 65+  [10944l]

## 2020-11-03 NOTE — PATIENT INSTRUCTIONS
Reduce dose of clonidine to 0.1mg twice a day  Continue hydralazine at 50mg twice a day  Follow up in 1 month

## 2020-11-04 ENCOUNTER — TELEPHONE (OUTPATIENT)
Dept: FAMILY MEDICINE | Facility: OTHER | Age: 85
End: 2020-11-04

## 2020-11-04 NOTE — TELEPHONE ENCOUNTER
Patient said Dr Dangelo changed her medication.   She would like someone to call her to go through the medications and directions with her.

## 2020-11-04 NOTE — TELEPHONE ENCOUNTER
Instructions    Reduce dose of clonidine to 0.1mg twice a day  Continue hydralazine at 50mg twice a day  Follow up in 1 month          After Visit Summary (Printed 11/3/2020)      Spoke to patient, patient was calling to clarify new dosing on clonidine. Above note from RK verbalized to patient and she is agreeable to plan. Patient wrote instructions down and read back information on doing    Lucille Cameron RN

## 2020-11-09 ENCOUNTER — TELEPHONE (OUTPATIENT)
Dept: FAMILY MEDICINE | Facility: OTHER | Age: 85
End: 2020-11-09

## 2020-11-09 NOTE — TELEPHONE ENCOUNTER
Patient was seen last week for a black eye, it seems to be going further down her cheek, is that ok?

## 2020-11-09 NOTE — TELEPHONE ENCOUNTER
No increased pain, no swelling, no vision changes, no drainage. Having green/purple changes to her bruising around her eye. We discussed the natural stages of bruises healing.     PLAN: advised patient to monitor symptoms at home. If she has any changes such as Pain, bruising, or swelling worsens, Vision changes, such as seeing small dots or double vision, Inability to move the eye, or Bleeding or discharge on the eyeball surface to call back.  Lucille Cameron RN

## 2020-11-12 NOTE — TELEPHONE ENCOUNTER
"SPIRITUAL HEALTH SERVICES  SPIRITUAL ASSESSMENT Progress Note (Palliative Focus)  [facility] ([place]) 4E    REFERRAL SOURCE: Palliative care follow up.    Care conference with patient Luis Alberto \"Chriss\" Viraj's wife Shauna and son Shin. Family received medical update and asked questions for clarification. Recommendation was made to family to transition to a comfort-focused approach given Chriss's condition.     Shauna and Shin Chriss is Yazidi ([employer] [place]).    Plan: I will follow for spiritual support while Palliative Care is consulted.    Shyann Thapa  Palliative   Pager [phone]  [facility] Inpatient Team Consult pager [phone] (M-F 8-4:30)  After-hours Answering Service [phone]    " Refused Prescriptions:                       Disp   Refills    metoprolol succinate ER (TOPROL-XL) 100 MG*135 ta*0        Sig: TAKE 1 & 1/2 (ONE & ONE-HALF) TABLETS BY MOUTH AT           BEDTIME    Patient needs appointment. Scheduled for today.     Melina Parra RN BSN

## 2020-12-10 ENCOUNTER — TELEPHONE (OUTPATIENT)
Dept: FAMILY MEDICINE | Facility: OTHER | Age: 85
End: 2020-12-10

## 2020-12-10 NOTE — TELEPHONE ENCOUNTER
Ok for a virtual visit as I have seen her in the clinic recently unless she had any new falls and need to have her functional status assessed

## 2020-12-10 NOTE — TELEPHONE ENCOUNTER
Patient's daughter stated she fell yesterday (12/10/2020) and would like to have an in person visit to discuss. Scheduled for Monday 12/14    Kathrine Guy MA

## 2020-12-10 NOTE — TELEPHONE ENCOUNTER
Reason for Call: Request for an order or referral:    Order or referral being requested: PT at home for legs     Date needed: as soon as possible    Has the patient been seen by the PCP for this problem? NO    Additional comments: please call to discuss referral for home PT    Phone number Patient can be reached at:  Other phone number:  [phone]    Best Time:  any    Can we leave a detailed message on this number?  YES    Call taken on 12/10/2020 at 9:27 AM by Nano Reich

## 2020-12-11 NOTE — PROGRESS NOTES
Subjective     Nina Villalpando is a 89 year old female who presents to clinic today for the following health issues:    HPI         Concern - Falling  Onset: couple years  Description: consistently falling at home, unknown reason why. This has been causing injuries- daughter states she has been in to see  Someone since this started.   Intensity: moderate  Progression of Symptoms:  worsening  Accompanying Signs & Symptoms: no new dizziness but pt states she does have some         Review of Systems   Constitutional, HEENT, cardiovascular, pulmonary, GI, , musculoskeletal, neuro, skin, endocrine and psych systems are negative, except as otherwise noted.      Objective    /82   Pulse 72   Temp 97.3  F (36.3  C) (Temporal)   Wt 69.4 kg (153 lb)   SpO2 97%   BMI 30.90 kg/m    Body mass index is 30.9 kg/m .  Physical Exam  Constitutional:       Appearance: Normal appearance.   HENT:      Head: Normocephalic and atraumatic.      Right Ear: Tympanic membrane normal.      Left Ear: Tympanic membrane normal.   Cardiovascular:      Rate and Rhythm: Normal rate and regular rhythm.      Pulses: Normal pulses.      Heart sounds: Normal heart sounds.   Pulmonary:      Effort: Pulmonary effort is normal.      Breath sounds: Normal breath sounds.   Musculoskeletal:         General: No swelling, tenderness, deformity or signs of injury.      Right lower leg: No edema.      Left lower leg: No edema.   Neurological:      General: No focal deficit present.      Mental Status: She is alert and oriented to person, place, and time.      Gait: Gait abnormal.   Psychiatric:         Mood and Affect: Mood normal.         Behavior: Behavior normal.         Thought Content: Thought content normal.         Judgment: Judgment normal.      1. Recurrent falls  Imbalance resulting from deformative osteoarthritis vs Orthostatic hypotension Vs Aortic stenosis  Lives by herself and would benefit from home health eval for fall risk  assessment and home  PT for core strengthening and exercises and a home health aide to help with chores related to have meal preparation and personal care.   Not interested in Home PT orders but daughter would like to have more information  On how a family friend who is a licensed health aide could be paid for helping out. Will reach out after COVID for PT orders.  BP have been well controlled . I do not see the need to reduce the dose as she does not complain about dizziness or lightheadedness. Advised to watch for these symptoms and report them . Will consider dose reduction if she is symptomatic. Does not wish to persue any aggressive interventions related to aortic stenosis. Will continue current to monitor.   Discussed home care  Reportable signs and symptoms discussed  RTC if symptoms persist or fail to improve'  - CARE COORDINATION REFERRAL

## 2020-12-14 ENCOUNTER — PATIENT OUTREACH (OUTPATIENT)
Dept: CARE COORDINATION | Facility: CLINIC | Age: 85
End: 2020-12-14

## 2020-12-14 ENCOUNTER — OFFICE VISIT (OUTPATIENT)
Dept: FAMILY MEDICINE | Facility: OTHER | Age: 85
End: 2020-12-14
Payer: COMMERCIAL

## 2020-12-14 VITALS
SYSTOLIC BLOOD PRESSURE: 136 MMHG | HEART RATE: 72 BPM | BODY MASS INDEX: 30.9 KG/M2 | DIASTOLIC BLOOD PRESSURE: 82 MMHG | OXYGEN SATURATION: 97 % | TEMPERATURE: 97.3 F | WEIGHT: 153 LBS

## 2020-12-14 DIAGNOSIS — M17.0 PRIMARY OSTEOARTHRITIS OF BOTH KNEES: ICD-10-CM

## 2020-12-14 DIAGNOSIS — I10 ESSENTIAL HYPERTENSION: ICD-10-CM

## 2020-12-14 DIAGNOSIS — I35.0 AORTIC VALVE STENOSIS, ETIOLOGY OF CARDIAC VALVE DISEASE UNSPECIFIED: ICD-10-CM

## 2020-12-14 DIAGNOSIS — R29.6 RECURRENT FALLS: Primary | ICD-10-CM

## 2020-12-14 PROCEDURE — 99214 OFFICE O/P EST MOD 30 MIN: CPT | Performed by: FAMILY MEDICINE

## 2020-12-14 ASSESSMENT — PAIN SCALES - GENERAL: PAINLEVEL: NO PAIN (0)

## 2020-12-14 NOTE — PROGRESS NOTES
Clinic Care Coordination Contact  [facility]/Voicemail       Clinical Data: CHWr Outreach  Outreach attempted x 1. Left message on patient's daughters  voicemail with call back information and requested return call.    Plan: CHW will try to reach patient again in 1-2 business days.    Mary SERRANO Community Health Worker  Clinic Care Coordination  [facility] : [address] [facility] [place], [place]  Phone: [phone]    Reason for Referral: Patient/Caregiver Support: patient has had falls twice in the last month and needing help with chores at home . Has friend who is a licensed home health aide who is willing to help her out and daughter wants to know if she would be paid for by medicare . Please get more resources regarding this      Notes:    Received a referral from patients PCP.    Looking for resources for help around the house or how patients froend could help and get paid for it.    Schedule with CC RN

## 2020-12-15 ENCOUNTER — PATIENT OUTREACH (OUTPATIENT)
Dept: NURSING | Facility: CLINIC | Age: 85
End: 2020-12-15
Payer: COMMERCIAL

## 2020-12-15 NOTE — PROGRESS NOTES
Clinic Care Coordination Contact  Community Health Worker Initial Outreach    CHW Initial Information Gathering:  Preferred Urgent Care: [facility], [phone]  Current living arrangement:: I live alone  Type of residence:: Other  Community Resources: None  Equipment Currently Used at Home: none  Informal Support system:: Children, Family  Transportation means:: Family  CHW Additional Questions  If ED/Hospital discharge, follow-up appointment scheduled as recommended?: N/A  Medication changes made following ED/Hospital discharge?: N/A  MyChart active?: No    Patient accepts CC: Yes. Patient scheduled for assessment with CC SW on 12/16/20 at 9:30 AM. Patient noted desire to discuss Patients daughter Shelby is wondering if Medicare will pay for her mom to have in home physical therapy. She said she thinks her mom may have frozen shoulder. Karena states the PCP wamts her mother to use a walker to help with falling.. Daughter is wondering if Medicare would pay for a walker. They also would like information for Meals on Wheels. You will be speaking with Shelby patients daughter [phone]. .     Mary SERRANO, Community Health Worker  Clinic Care Coordination  [facility] : [facility] [place] [place]  Phone: [phone]    Reason for Referral: Patient/Caregiver Support: patient has had falls twice in the last month and needing help with chores at home . Has friend who is a licensed home health aide who is willing to help her out and daughter wants to know if she would be paid for by medicare . Please get more resources regarding this. If this does not work out, I am willing to place home health orders

## 2020-12-16 ENCOUNTER — PATIENT OUTREACH (OUTPATIENT)
Dept: FAMILY MEDICINE | Facility: CLINIC | Age: 85
End: 2020-12-16
Payer: COMMERCIAL

## 2020-12-16 NOTE — PROGRESS NOTES
Clinic Care Coordination Contact  Care Team Conversations    Introduced self and roll.  Daughter only wanted to talk about home care, meals on wheels, and use of walker.  She did not want to do any assessments when asked several times.      Daughter wanted home care PT for pt to help her with mobility and safe use of walker.  Reviewed [facility] care vs skilled needs.  They would like [facility] for home care and to call daughter to schedule.     Daughter also wanted meals on wheels phone number.  This was provided - [phone].      Daughter declined care coordination.      Plan:  Will send introduction letter and route to PCP for home care orders.     BRANDON Palacio, [facility] - Care Coordinator   [facility]  12/16/2020   9:59 AM  [phone]

## 2020-12-16 NOTE — LETTER
[facility]ARE COORDIN[facility] - [facility]  [address] [place]   [address]  Tel. [phone]    December 16, 2020    Nina Villalpando  [address] [address]      Dear Nina,    I am a clinic care coordinator who works with Kyra Dangelo MD at [facility]. I wanted to thank Shelby for spending the time to talk with me.  Below is a description of clinic care coordination and how I can further assist you.      The clinic care coordination team is made up of a registered nurse,  and community health worker who understand the health care system. The goal of clinic care coordination is to help you manage your health and improve access to the health care system in the most efficient manner. The team can assist you in meeting your health care goals by providing education, coordinating services, strengthening the communication among your providers and supporting you with any resource needs.    Please feel free to contact me at [phone] with any questions or concerns. We are focused on providing you with the highest-quality healthcare experience possible and that all starts with you.     Sincerely,     Tracee Roca CATINA  [facility] - Care Coordination  [facility]   [phone]

## 2021-01-17 DIAGNOSIS — I10 HTN, GOAL BELOW 150/90: ICD-10-CM

## 2021-01-18 RX ORDER — METOPROLOL SUCCINATE 100 MG/1
TABLET, EXTENDED RELEASE ORAL
Qty: 135 TABLET | Refills: 1 | Status: SHIPPED | OUTPATIENT
Start: 2021-01-18 | End: 2022-01-01

## 2021-01-18 NOTE — TELEPHONE ENCOUNTER
Prescription approved per [facility] Refill Protocol.    Hilda Brown RN on 1/18/2021 at 5:39 PM

## 2021-10-18 DIAGNOSIS — I10 HTN, GOAL BELOW 150/90: ICD-10-CM

## 2021-10-20 RX ORDER — HYDRALAZINE HYDROCHLORIDE 50 MG/1
TABLET, FILM COATED ORAL
Qty: 270 TABLET | Refills: 0 | OUTPATIENT
Start: 2021-10-20

## 2021-10-20 RX ORDER — CLONIDINE HYDROCHLORIDE 0.2 MG/1
TABLET ORAL
Qty: 180 TABLET | Refills: 0 | OUTPATIENT
Start: 2021-10-20

## 2021-10-20 RX ORDER — METOPROLOL SUCCINATE 100 MG/1
TABLET, EXTENDED RELEASE ORAL
Qty: 135 TABLET | Refills: 0 | OUTPATIENT
Start: 2021-10-20

## 2021-10-20 NOTE — TELEPHONE ENCOUNTER
See telephone encounter from 10/15/2021, [facility].    Satish Stephens, RN, BSN

## 2022-01-01 ENCOUNTER — HOSPITAL ENCOUNTER (OUTPATIENT)
Facility: CLINIC | Age: 87
Setting detail: OBSERVATION
Discharge: HOME | End: 2022-04-08
Attending: FAMILY MEDICINE | Admitting: FAMILY MEDICINE
Payer: COMMERCIAL

## 2022-01-01 ENCOUNTER — TELEPHONE (OUTPATIENT)
Dept: FAMILY MEDICINE | Facility: OTHER | Age: 87
End: 2022-01-01
Payer: COMMERCIAL

## 2022-01-01 ENCOUNTER — LAB REQUISITION (OUTPATIENT)
Dept: LAB | Facility: CLINIC | Age: 87
End: 2022-01-01
Payer: COMMERCIAL

## 2022-01-01 ENCOUNTER — OFFICE VISIT (OUTPATIENT)
Dept: FAMILY MEDICINE | Facility: OTHER | Age: 87
End: 2022-01-01

## 2022-01-01 ENCOUNTER — TRANSITIONAL CARE UNIT VISIT (OUTPATIENT)
Dept: GERIATRICS | Facility: CLINIC | Age: 87
End: 2022-01-01
Payer: COMMERCIAL

## 2022-01-01 ENCOUNTER — MEDICAL CORRESPONDENCE (OUTPATIENT)
Dept: HEALTH INFORMATION MANAGEMENT | Facility: CLINIC | Age: 87
End: 2022-01-01

## 2022-01-01 ENCOUNTER — DOCUMENTATION ONLY (OUTPATIENT)
Dept: OTHER | Facility: CLINIC | Age: 87
End: 2022-01-01

## 2022-01-01 ENCOUNTER — OFFICE VISIT (OUTPATIENT)
Dept: GERIATRICS | Facility: CLINIC | Age: 87
End: 2022-01-01
Payer: COMMERCIAL

## 2022-01-01 ENCOUNTER — APPOINTMENT (OUTPATIENT)
Dept: CT IMAGING | Facility: CLINIC | Age: 87
End: 2022-01-01
Attending: FAMILY MEDICINE
Payer: COMMERCIAL

## 2022-01-01 ENCOUNTER — MEDICAL CORRESPONDENCE (OUTPATIENT)
Dept: FAMILY MEDICINE | Facility: OTHER | Age: 87
End: 2022-01-01

## 2022-01-01 ENCOUNTER — APPOINTMENT (OUTPATIENT)
Dept: PHYSICAL THERAPY | Facility: CLINIC | Age: 87
End: 2022-01-01
Payer: COMMERCIAL

## 2022-01-01 ENCOUNTER — MEDICAL CORRESPONDENCE (OUTPATIENT)
Dept: PEDIATRICS | Facility: OTHER | Age: 87
End: 2022-01-01

## 2022-01-01 ENCOUNTER — NURSE TRIAGE (OUTPATIENT)
Dept: FAMILY MEDICINE | Facility: OTHER | Age: 87
End: 2022-01-01
Payer: COMMERCIAL

## 2022-01-01 ENCOUNTER — TELEPHONE (OUTPATIENT)
Dept: FAMILY MEDICINE | Facility: OTHER | Age: 87
End: 2022-01-01

## 2022-01-01 ENCOUNTER — TELEPHONE (OUTPATIENT)
Dept: FAMILY MEDICINE | Facility: CLINIC | Age: 87
End: 2022-01-01

## 2022-01-01 ENCOUNTER — LAB REQUISITION (OUTPATIENT)
Dept: LAB | Facility: CLINIC | Age: 87
End: 2022-01-01

## 2022-01-01 ENCOUNTER — DOCUMENTATION ONLY (OUTPATIENT)
Dept: OTHER | Facility: CLINIC | Age: 87
End: 2022-01-01
Payer: COMMERCIAL

## 2022-01-01 ENCOUNTER — APPOINTMENT (OUTPATIENT)
Dept: GENERAL RADIOLOGY | Facility: CLINIC | Age: 87
End: 2022-01-01
Attending: EMERGENCY MEDICINE
Payer: COMMERCIAL

## 2022-01-01 ENCOUNTER — APPOINTMENT (OUTPATIENT)
Dept: OCCUPATIONAL THERAPY | Facility: CLINIC | Age: 87
End: 2022-01-01
Payer: COMMERCIAL

## 2022-01-01 ENCOUNTER — NURSE TRIAGE (OUTPATIENT)
Dept: NURSING | Facility: CLINIC | Age: 87
End: 2022-01-01

## 2022-01-01 ENCOUNTER — ANCILLARY PROCEDURE (OUTPATIENT)
Dept: GENERAL RADIOLOGY | Facility: OTHER | Age: 87
End: 2022-01-01
Attending: FAMILY MEDICINE
Payer: COMMERCIAL

## 2022-01-01 ENCOUNTER — PATIENT OUTREACH (OUTPATIENT)
Dept: CARE COORDINATION | Facility: CLINIC | Age: 87
End: 2022-01-01

## 2022-01-01 ENCOUNTER — HOSPITAL ENCOUNTER (EMERGENCY)
Facility: CLINIC | Age: 87
Discharge: HOME | End: 2022-07-11
Attending: FAMILY MEDICINE | Admitting: FAMILY MEDICINE
Payer: COMMERCIAL

## 2022-01-01 ENCOUNTER — OFFICE VISIT (OUTPATIENT)
Dept: FAMILY MEDICINE | Facility: CLINIC | Age: 87
End: 2022-01-01
Payer: COMMERCIAL

## 2022-01-01 ENCOUNTER — DISCHARGE SUMMARY NURSING HOME (OUTPATIENT)
Dept: GERIATRICS | Facility: CLINIC | Age: 87
End: 2022-01-01
Payer: COMMERCIAL

## 2022-01-01 VITALS
OXYGEN SATURATION: 93 % | TEMPERATURE: 98.2 F | BODY MASS INDEX: 27.27 KG/M2 | SYSTOLIC BLOOD PRESSURE: 126 MMHG | WEIGHT: 135 LBS | HEART RATE: 53 BPM | DIASTOLIC BLOOD PRESSURE: 57 MMHG | RESPIRATION RATE: 16 BRPM

## 2022-01-01 VITALS
OXYGEN SATURATION: 96 % | RESPIRATION RATE: 8 BRPM | DIASTOLIC BLOOD PRESSURE: 64 MMHG | TEMPERATURE: 97.2 F | HEART RATE: 83 BPM | WEIGHT: 127 LBS | BODY MASS INDEX: 25.65 KG/M2 | SYSTOLIC BLOOD PRESSURE: 114 MMHG

## 2022-01-01 VITALS
DIASTOLIC BLOOD PRESSURE: 68 MMHG | TEMPERATURE: 98.5 F | BODY MASS INDEX: 27.3 KG/M2 | WEIGHT: 135.4 LBS | OXYGEN SATURATION: 95 % | HEART RATE: 54 BPM | SYSTOLIC BLOOD PRESSURE: 140 MMHG | RESPIRATION RATE: 18 BRPM | HEIGHT: 59 IN

## 2022-01-01 VITALS
HEART RATE: 78 BPM | WEIGHT: 132.8 LBS | SYSTOLIC BLOOD PRESSURE: 154 MMHG | RESPIRATION RATE: 17 BRPM | TEMPERATURE: 98 F | BODY MASS INDEX: 26.77 KG/M2 | DIASTOLIC BLOOD PRESSURE: 78 MMHG | OXYGEN SATURATION: 93 % | HEIGHT: 59 IN

## 2022-01-01 VITALS
BODY MASS INDEX: 26 KG/M2 | HEART RATE: 76 BPM | DIASTOLIC BLOOD PRESSURE: 80 MMHG | HEIGHT: 59 IN | WEIGHT: 129 LBS | TEMPERATURE: 97.8 F | RESPIRATION RATE: 16 BRPM | OXYGEN SATURATION: 97 % | SYSTOLIC BLOOD PRESSURE: 143 MMHG

## 2022-01-01 VITALS
WEIGHT: 134.3 LBS | TEMPERATURE: 97.6 F | SYSTOLIC BLOOD PRESSURE: 165 MMHG | RESPIRATION RATE: 20 BRPM | BODY MASS INDEX: 27.13 KG/M2 | OXYGEN SATURATION: 97 % | HEART RATE: 69 BPM | DIASTOLIC BLOOD PRESSURE: 85 MMHG

## 2022-01-01 VITALS
WEIGHT: 128.4 LBS | TEMPERATURE: 98.2 F | BODY MASS INDEX: 25.88 KG/M2 | OXYGEN SATURATION: 93 % | RESPIRATION RATE: 19 BRPM | DIASTOLIC BLOOD PRESSURE: 77 MMHG | SYSTOLIC BLOOD PRESSURE: 162 MMHG | HEART RATE: 65 BPM | HEIGHT: 59 IN

## 2022-01-01 VITALS
SYSTOLIC BLOOD PRESSURE: 150 MMHG | TEMPERATURE: 98.2 F | HEIGHT: 59 IN | RESPIRATION RATE: 18 BRPM | HEART RATE: 60 BPM | OXYGEN SATURATION: 96 % | BODY MASS INDEX: 27.58 KG/M2 | DIASTOLIC BLOOD PRESSURE: 67 MMHG | WEIGHT: 136.8 LBS

## 2022-01-01 VITALS
SYSTOLIC BLOOD PRESSURE: 119 MMHG | TEMPERATURE: 97.1 F | DIASTOLIC BLOOD PRESSURE: 52 MMHG | BODY MASS INDEX: 26.65 KG/M2 | RESPIRATION RATE: 16 BRPM | HEIGHT: 59 IN | OXYGEN SATURATION: 96 % | HEART RATE: 50 BPM | WEIGHT: 132.2 LBS

## 2022-01-01 VITALS
SYSTOLIC BLOOD PRESSURE: 115 MMHG | DIASTOLIC BLOOD PRESSURE: 70 MMHG | OXYGEN SATURATION: 97 % | TEMPERATURE: 97.3 F | RESPIRATION RATE: 20 BRPM | HEIGHT: 59 IN | BODY MASS INDEX: 27.38 KG/M2 | WEIGHT: 135.8 LBS | HEART RATE: 50 BPM

## 2022-01-01 VITALS
WEIGHT: 135 LBS | HEIGHT: 59 IN | DIASTOLIC BLOOD PRESSURE: 98 MMHG | RESPIRATION RATE: 16 BRPM | SYSTOLIC BLOOD PRESSURE: 180 MMHG | BODY MASS INDEX: 27.21 KG/M2 | HEART RATE: 82 BPM | OXYGEN SATURATION: 93 % | TEMPERATURE: 98.2 F

## 2022-01-01 DIAGNOSIS — R29.6 RECURRENT FALLS: ICD-10-CM

## 2022-01-01 DIAGNOSIS — D64.9 ANEMIA, UNSPECIFIED TYPE: ICD-10-CM

## 2022-01-01 DIAGNOSIS — I35.0 AORTIC VALVE STENOSIS, ETIOLOGY OF CARDIAC VALVE DISEASE UNSPECIFIED: ICD-10-CM

## 2022-01-01 DIAGNOSIS — I10 HTN, GOAL BELOW 150/90: ICD-10-CM

## 2022-01-01 DIAGNOSIS — M17.0 PRIMARY OSTEOARTHRITIS OF BOTH KNEES: Primary | ICD-10-CM

## 2022-01-01 DIAGNOSIS — I12.9 HYPERTENSIVE CHRONIC KIDNEY DISEASE WITH STAGE 1 THROUGH STAGE 4 CHRONIC KIDNEY DISEASE, OR UNSPECIFIED CHRONIC KIDNEY DISEASE: ICD-10-CM
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
## 2022-01-01 DIAGNOSIS — E50.7 XEROPHTHALMIA: Primary | ICD-10-CM

## 2022-01-01 DIAGNOSIS — N18.31 STAGE 3A CHRONIC KIDNEY DISEASE (H): ICD-10-CM
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
## 2022-01-01 DIAGNOSIS — F03.90 DEMENTIA WITHOUT BEHAVIORAL DISTURBANCE, UNSPECIFIED DEMENTIA TYPE: ICD-10-CM

## 2022-01-01 DIAGNOSIS — S49.91XA INJURY OF RIGHT SHOULDER, INITIAL ENCOUNTER: Primary | ICD-10-CM

## 2022-01-01 DIAGNOSIS — N18.32 STAGE 3B CHRONIC KIDNEY DISEASE (H): ICD-10-CM

## 2022-01-01 DIAGNOSIS — R44.1 VISUAL HALLUCINATION: ICD-10-CM

## 2022-01-01 DIAGNOSIS — Z53.9 DIAGNOSIS NOT YET DEFINED: Primary | ICD-10-CM

## 2022-01-01 DIAGNOSIS — Z53.9 ERRONEOUS ENCOUNTER--DISREGARD: Primary | ICD-10-CM

## 2022-01-01 DIAGNOSIS — F03.A0 MILD DEMENTIA (H): ICD-10-CM

## 2022-01-01 DIAGNOSIS — I10 ESSENTIAL HYPERTENSION: ICD-10-CM
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
## 2022-01-01 DIAGNOSIS — R29.6 MULTIPLE FALLS: ICD-10-CM

## 2022-01-01 DIAGNOSIS — S49.91XA INJURY OF RIGHT SHOULDER, INITIAL ENCOUNTER: ICD-10-CM

## 2022-01-01 DIAGNOSIS — Z09 HOSPITAL DISCHARGE FOLLOW-UP: Primary | ICD-10-CM

## 2022-01-01 DIAGNOSIS — I10 ESSENTIAL HYPERTENSION: Primary | ICD-10-CM

## 2022-01-01 DIAGNOSIS — R41.89 COGNITIVE DECLINE: ICD-10-CM

## 2022-01-01 DIAGNOSIS — J32.9 CHRONIC SINUSITIS, UNSPECIFIED LOCATION: ICD-10-CM

## 2022-01-01 DIAGNOSIS — I15.9 LABILE SECONDARY HYPERTENSION: ICD-10-CM

## 2022-01-01 DIAGNOSIS — U07.1 INFECTION DUE TO 2019 NOVEL CORONAVIRUS: ICD-10-CM
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
## 2022-01-01 DIAGNOSIS — Z29.9 PREVENTIVE MEASURE: Primary | ICD-10-CM

## 2022-01-01 DIAGNOSIS — S01.81XA CHIN LACERATION, INITIAL ENCOUNTER: ICD-10-CM
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
## 2022-01-01 DIAGNOSIS — R29.6 RECURRENT FALLS: Primary | ICD-10-CM

## 2022-01-01 DIAGNOSIS — Z13.220 SCREENING FOR HYPERLIPIDEMIA: ICD-10-CM

## 2022-01-01 DIAGNOSIS — Z11.52 ENCOUNTER FOR SCREENING LABORATORY TESTING FOR SEVERE ACUTE RESPIRATORY SYNDROME CORONAVIRUS 2 (SARS-COV-2): ICD-10-CM

## 2022-01-01 DIAGNOSIS — S01.81XA FACIAL LACERATION, INITIAL ENCOUNTER: ICD-10-CM

## 2022-01-01 DIAGNOSIS — U07.1 INFECTION DUE TO 2019 NOVEL CORONAVIRUS: Primary | ICD-10-CM

## 2022-01-01 LAB
ALBUMIN SERPL-MCNC: 3.2 G/DL (ref 3.4–5)
ALBUMIN UR-MCNC: NEGATIVE MG/DL
ALP SERPL-CCNC: 108 U/L (ref 40–150)
ALT SERPL W P-5'-P-CCNC: 13 U/L (ref 0–50)
ANION GAP SERPL CALCULATED.3IONS-SCNC: 4 MMOL/L (ref 3–14)
ANION GAP SERPL CALCULATED.3IONS-SCNC: 5 MMOL/L (ref 3–14)
ANION GAP SERPL CALCULATED.3IONS-SCNC: 7 MMOL/L (ref 3–14)
APPEARANCE UR: ABNORMAL
APPEARANCE UR: CLEAR
APPEARANCE UR: CLEAR
AST SERPL W P-5'-P-CCNC: 26 U/L (ref 0–45)
BACTERIA #/AREA URNS HPF: ABNORMAL /HPF
BACTERIA UR CULT: NO GROWTH
BACTERIA UR CULT: NO GROWTH
BASOPHILS # BLD AUTO: 0 10E3/UL (ref 0–0.2)
BASOPHILS # BLD AUTO: 0.1 10E3/UL (ref 0–0.2)
BASOPHILS NFR BLD AUTO: 1 %
BASOPHILS NFR BLD AUTO: 1 %
BILIRUB SERPL-MCNC: 0.5 MG/DL (ref 0.2–1.3)
BILIRUB UR QL STRIP: NEGATIVE
BUN SERPL-MCNC: 26 MG/DL (ref 7–30)
BUN SERPL-MCNC: 28 MG/DL (ref 7–30)
BUN SERPL-MCNC: 31 MG/DL (ref 7–30)
CALCIUM SERPL-MCNC: 8.7 MG/DL (ref 8.5–10.1)
CALCIUM SERPL-MCNC: 8.8 MG/DL (ref 8.5–10.1)
CALCIUM SERPL-MCNC: 8.9 MG/DL (ref 8.5–10.1)
CHLORIDE BLD-SCNC: 103 MMOL/L (ref 94–109)
CHLORIDE BLD-SCNC: 108 MMOL/L (ref 94–109)
CHLORIDE BLD-SCNC: 99 MMOL/L (ref 94–109)
CO2 SERPL-SCNC: 26 MMOL/L (ref 20–32)
CO2 SERPL-SCNC: 27 MMOL/L (ref 20–32)
CO2 SERPL-SCNC: 29 MMOL/L (ref 20–32)
COLOR UR AUTO: ABNORMAL
COLOR UR AUTO: YELLOW
COLOR UR AUTO: YELLOW
CREAT SERPL-MCNC: 1.16 MG/DL (ref 0.52–1.04)
CREAT SERPL-MCNC: 1.25 MG/DL (ref 0.52–1.04)
CREAT SERPL-MCNC: 1.36 MG/DL (ref 0.52–1.04)
EOSINOPHIL # BLD AUTO: 0.1 10E3/UL (ref 0–0.7)
EOSINOPHIL # BLD AUTO: 0.1 10E3/UL (ref 0–0.7)
EOSINOPHIL NFR BLD AUTO: 1 %
EOSINOPHIL NFR BLD AUTO: 1 %
ERYTHROCYTE [DISTWIDTH] IN BLOOD BY AUTOMATED COUNT: 13.6 % (ref 10–15)
ERYTHROCYTE [DISTWIDTH] IN BLOOD BY AUTOMATED COUNT: 13.8 % (ref 10–15)
ERYTHROCYTE [DISTWIDTH] IN BLOOD BY AUTOMATED COUNT: 14.6 % (ref 10–15)
GFR SERPL CREATININE-BSD FRML MDRD: 37 ML/MIN/1.73M2
GFR SERPL CREATININE-BSD FRML MDRD: 40 ML/MIN/1.73M2
GFR SERPL CREATININE-BSD FRML MDRD: 44 ML/MIN/1.73M2
GLUCOSE BLD-MCNC: 87 MG/DL (ref 70–99)
GLUCOSE BLD-MCNC: 89 MG/DL (ref 70–99)
GLUCOSE BLD-MCNC: 92 MG/DL (ref 70–99)
GLUCOSE UR STRIP-MCNC: NEGATIVE MG/DL
HCT VFR BLD AUTO: 28.5 % (ref 35–47)
HCT VFR BLD AUTO: 28.9 % (ref 35–47)
HCT VFR BLD AUTO: 32.2 % (ref 35–47)
HGB BLD-MCNC: 10.9 G/DL (ref 11.7–15.7)
HGB BLD-MCNC: 9.7 G/DL (ref 11.7–15.7)
HGB BLD-MCNC: 9.9 G/DL (ref 11.7–15.7)
HGB UR QL STRIP: NEGATIVE
HYALINE CASTS: 1 /LPF
IMM GRANULOCYTES # BLD: 0 10E3/UL
IMM GRANULOCYTES # BLD: 0 10E3/UL
IMM GRANULOCYTES NFR BLD: 0 %
IMM GRANULOCYTES NFR BLD: 0 %
KETONES UR STRIP-MCNC: NEGATIVE MG/DL
LEUKOCYTE ESTERASE UR QL STRIP: ABNORMAL
LYMPHOCYTES # BLD AUTO: 0.9 10E3/UL (ref 0.8–5.3)
LYMPHOCYTES # BLD AUTO: 1.2 10E3/UL (ref 0.8–5.3)
LYMPHOCYTES NFR BLD AUTO: 17 %
LYMPHOCYTES NFR BLD AUTO: 20 %
MCH RBC QN AUTO: 32.1 PG (ref 26.5–33)
MCH RBC QN AUTO: 32.4 PG (ref 26.5–33)
MCH RBC QN AUTO: 32.6 PG (ref 26.5–33)
MCHC RBC AUTO-ENTMCNC: 33.9 G/DL (ref 31.5–36.5)
MCHC RBC AUTO-ENTMCNC: 34 G/DL (ref 31.5–36.5)
MCHC RBC AUTO-ENTMCNC: 34.3 G/DL (ref 31.5–36.5)
MCV RBC AUTO: 95 FL (ref 78–100)
MONOCYTES # BLD AUTO: 0.5 10E3/UL (ref 0–1.3)
MONOCYTES # BLD AUTO: 0.7 10E3/UL (ref 0–1.3)
MONOCYTES NFR BLD AUTO: 10 %
MONOCYTES NFR BLD AUTO: 11 %
NEUTROPHILS # BLD AUTO: 3.6 10E3/UL (ref 1.6–8.3)
NEUTROPHILS # BLD AUTO: 3.9 10E3/UL (ref 1.6–8.3)
NEUTROPHILS NFR BLD AUTO: 67 %
NEUTROPHILS NFR BLD AUTO: 71 %
NITRATE UR QL: NEGATIVE
NRBC # BLD AUTO: 0 10E3/UL
NRBC # BLD AUTO: 0 10E3/UL
NRBC BLD AUTO-RTO: 0 /100
NRBC BLD AUTO-RTO: 0 /100
PH UR STRIP: 6 [PH] (ref 5–7)
PH UR STRIP: 6 [PH] (ref 5–7)
PH UR STRIP: 7 [PH] (ref 5–7)
PLATELET # BLD AUTO: 232 10E3/UL (ref 150–450)
PLATELET # BLD AUTO: 242 10E3/UL (ref 150–450)
PLATELET # BLD AUTO: 248 10E3/UL (ref 150–450)
POTASSIUM BLD-SCNC: 3.8 MMOL/L (ref 3.4–5.3)
POTASSIUM BLD-SCNC: 3.9 MMOL/L (ref 3.4–5.3)
POTASSIUM BLD-SCNC: 4.2 MMOL/L (ref 3.4–5.3)
PROT SERPL-MCNC: 6.7 G/DL (ref 6.8–8.8)
RBC # BLD AUTO: 2.99 10E6/UL (ref 3.8–5.2)
RBC # BLD AUTO: 3.04 10E6/UL (ref 3.8–5.2)
RBC # BLD AUTO: 3.4 10E6/UL (ref 3.8–5.2)
RBC URINE: 0 /HPF
RBC URINE: 0 /HPF
RBC URINE: 1 /HPF
SARS-COV-2 RNA RESP QL NAA+PROBE: NEGATIVE
SODIUM SERPL-SCNC: 133 MMOL/L (ref 133–144)
SODIUM SERPL-SCNC: 134 MMOL/L (ref 133–144)
SODIUM SERPL-SCNC: 141 MMOL/L (ref 133–144)
SP GR UR STRIP: 1 (ref 1–1.03)
SP GR UR STRIP: 1.01 (ref 1–1.03)
SP GR UR STRIP: 1.01 (ref 1–1.03)
SQUAMOUS EPITHELIAL: <1 /HPF
TSH SERPL DL<=0.005 MIU/L-ACNC: 3.19 MU/L (ref 0.4–4)
UROBILINOGEN UR STRIP-MCNC: NORMAL MG/DL
WBC # BLD AUTO: 4.7 10E3/UL (ref 4–11)
WBC # BLD AUTO: 5.1 10E3/UL (ref 4–11)
WBC # BLD AUTO: 5.9 10E3/UL (ref 4–11)
WBC URINE: 18 /HPF
WBC URINE: 3 /HPF
WBC URINE: 5 /HPF

## 2022-01-01 PROCEDURE — 72125 CT NECK SPINE W/O DYE: CPT

## 2022-01-01 PROCEDURE — 250N000013 HC RX MED GY IP 250 OP 250 PS 637: Performed by: HOSPITALIST

## 2022-01-01 PROCEDURE — G0378 HOSPITAL OBSERVATION PER HR: HCPCS

## 2022-01-01 PROCEDURE — 91301 HC RX IP 250 OP 636: CPT | Performed by: NURSE PRACTITIONER

## 2022-01-01 PROCEDURE — 99308 SBSQ NF CARE LOW MDM 20: CPT | Performed by: NURSE PRACTITIONER

## 2022-01-01 PROCEDURE — 81001 URINALYSIS AUTO W/SCOPE: CPT | Performed by: FAMILY MEDICINE

## 2022-01-01 PROCEDURE — 96125 COGNITIVE TEST BY HC PRO: CPT | Mod: GO

## 2022-01-01 PROCEDURE — 82310 ASSAY OF CALCIUM: CPT | Performed by: NURSE PRACTITIONER

## 2022-01-01 PROCEDURE — 99309 SBSQ NF CARE MODERATE MDM 30: CPT | Performed by: NURSE PRACTITIONER

## 2022-01-01 PROCEDURE — 97161 PT EVAL LOW COMPLEX 20 MIN: CPT | Mod: GP | Performed by: PHYSICAL THERAPIST

## 2022-01-01 PROCEDURE — 99305 1ST NF CARE MODERATE MDM 35: CPT | Performed by: FAMILY MEDICINE

## 2022-01-01 PROCEDURE — 99316 NF DSCHRG MGMT 30 MIN+: CPT | Performed by: FAMILY MEDICINE

## 2022-01-01 PROCEDURE — 81001 URINALYSIS AUTO W/SCOPE: CPT | Performed by: HOSPITALIST

## 2022-01-01 PROCEDURE — 99283 EMERGENCY DEPT VISIT LOW MDM: CPT | Performed by: FAMILY MEDICINE

## 2022-01-01 PROCEDURE — 84443 ASSAY THYROID STIM HORMONE: CPT | Performed by: EMERGENCY MEDICINE

## 2022-01-01 PROCEDURE — 73030 X-RAY EXAM OF SHOULDER: CPT | Mod: TC | Performed by: RADIOLOGY

## 2022-01-01 PROCEDURE — 99220 PR INITIAL OBSERVATION CARE,LEVEL III: CPT | Performed by: HOSPITALIST

## 2022-01-01 PROCEDURE — 99285 EMERGENCY DEPT VISIT HI MDM: CPT | Performed by: FAMILY MEDICINE

## 2022-01-01 PROCEDURE — G0180 MD CERTIFICATION HHA PATIENT: HCPCS | Performed by: FAMILY MEDICINE

## 2022-01-01 PROCEDURE — 87086 URINE CULTURE/COLONY COUNT: CPT | Performed by: FAMILY MEDICINE

## 2022-01-01 PROCEDURE — 85025 COMPLETE CBC W/AUTO DIFF WBC: CPT | Performed by: EMERGENCY MEDICINE

## 2022-01-01 PROCEDURE — 36415 COLL VENOUS BLD VENIPUNCTURE: CPT | Performed by: FAMILY MEDICINE

## 2022-01-01 PROCEDURE — 80053 COMPREHEN METABOLIC PANEL: CPT | Performed by: EMERGENCY MEDICINE

## 2022-01-01 PROCEDURE — 81001 URINALYSIS AUTO W/SCOPE: CPT | Mod: ORL | Performed by: FAMILY MEDICINE

## 2022-01-01 PROCEDURE — 85025 COMPLETE CBC W/AUTO DIFF WBC: CPT | Performed by: FAMILY MEDICINE

## 2022-01-01 PROCEDURE — 87086 URINE CULTURE/COLONY COUNT: CPT | Performed by: HOSPITALIST

## 2022-01-01 PROCEDURE — 99214 OFFICE O/P EST MOD 30 MIN: CPT | Performed by: FAMILY MEDICINE

## 2022-01-01 PROCEDURE — 87635 SARS-COV-2 COVID-19 AMP PRB: CPT | Performed by: EMERGENCY MEDICINE

## 2022-01-01 PROCEDURE — 99217 PR OBSERVATION CARE DISCHARGE: CPT | Performed by: HOSPITALIST

## 2022-01-01 PROCEDURE — 97530 THERAPEUTIC ACTIVITIES: CPT | Mod: GP | Performed by: PHYSICAL THERAPIST

## 2022-01-01 PROCEDURE — 250N000011 HC RX IP 250 OP 636: Performed by: NURSE PRACTITIONER

## 2022-01-01 PROCEDURE — P9604 ONE-WAY ALLOW PRORATED TRIP: HCPCS | Performed by: NURSE PRACTITIONER

## 2022-01-01 PROCEDURE — 93005 ELECTROCARDIOGRAM TRACING: CPT | Performed by: FAMILY MEDICINE

## 2022-01-01 PROCEDURE — 80048 BASIC METABOLIC PNL TOTAL CA: CPT | Performed by: FAMILY MEDICINE

## 2022-01-01 PROCEDURE — 97166 OT EVAL MOD COMPLEX 45 MIN: CPT | Mod: GO

## 2022-01-01 PROCEDURE — 99214 OFFICE O/P EST MOD 30 MIN: CPT | Performed by: STUDENT IN AN ORGANIZED HEALTH CARE EDUCATION/TRAINING PROGRAM

## 2022-01-01 PROCEDURE — 70450 CT HEAD/BRAIN W/O DYE: CPT

## 2022-01-01 PROCEDURE — 99285 EMERGENCY DEPT VISIT HI MDM: CPT | Mod: 25 | Performed by: FAMILY MEDICINE

## 2022-01-01 PROCEDURE — C9803 HOPD COVID-19 SPEC COLLECT: HCPCS | Performed by: FAMILY MEDICINE

## 2022-01-01 PROCEDURE — 36415 COLL VENOUS BLD VENIPUNCTURE: CPT | Performed by: EMERGENCY MEDICINE

## 2022-01-01 PROCEDURE — 85027 COMPLETE CBC AUTOMATED: CPT | Performed by: NURSE PRACTITIONER

## 2022-01-01 PROCEDURE — 73562 X-RAY EXAM OF KNEE 3: CPT | Mod: 50

## 2022-01-01 PROCEDURE — 99284 EMERGENCY DEPT VISIT MOD MDM: CPT | Performed by: FAMILY MEDICINE

## 2022-01-01 PROCEDURE — 36415 COLL VENOUS BLD VENIPUNCTURE: CPT | Performed by: NURSE PRACTITIONER

## 2022-01-01 PROCEDURE — 0013A HC ADMIN COVID VAC MODERNA, 3RD DOSE IMM COMP PT: CPT | Performed by: NURSE PRACTITIONER

## 2022-01-01 RX ORDER — SENNA AND DOCUSATE SODIUM 50; 8.6 MG/1; MG/1
2 TABLET, FILM COATED ORAL DAILY PRN
COMMUNITY
Start: 2022-01-01 | End: 2022-01-01

## 2022-01-01 RX ORDER — MULTIVITAMIN,THERAPEUTIC
1 TABLET ORAL DAILY
COMMUNITY
Start: 2022-01-01 | End: 2022-01-01

## 2022-01-01 RX ORDER — CLONIDINE HYDROCHLORIDE 0.2 MG/1
0.2 TABLET ORAL 2 TIMES DAILY
COMMUNITY
Start: 2022-01-17

## 2022-01-01 RX ORDER — HYDRALAZINE HYDROCHLORIDE 25 MG/1
100 TABLET, FILM COATED ORAL ONCE
Status: DISCONTINUED | OUTPATIENT
Start: 2022-01-01 | End: 2022-01-01 | Stop reason: HOSPADM

## 2022-01-01 RX ORDER — POLYETHYLENE GLYCOL 400 AND PROPYLENE GLYCOL 4; 3 MG/ML; MG/ML
1 SOLUTION/ DROPS OPHTHALMIC
Qty: 30 ML | Refills: 11 | Status: SHIPPED | OUTPATIENT
Start: 2022-01-01

## 2022-01-01 RX ORDER — ONDANSETRON 2 MG/ML
4 INJECTION INTRAMUSCULAR; INTRAVENOUS EVERY 6 HOURS PRN
Status: DISCONTINUED | OUTPATIENT
Start: 2022-01-01 | End: 2022-01-01 | Stop reason: HOSPADM

## 2022-01-01 RX ORDER — CLONIDINE HYDROCHLORIDE 0.1 MG/1
0.2 TABLET ORAL 2 TIMES DAILY
Status: DISCONTINUED | OUTPATIENT
Start: 2022-01-01 | End: 2022-01-01 | Stop reason: HOSPADM

## 2022-01-01 RX ORDER — METOPROLOL SUCCINATE 100 MG/1
100 TABLET, EXTENDED RELEASE ORAL DAILY
COMMUNITY
Start: 2022-01-01

## 2022-01-01 RX ORDER — METOPROLOL SUCCINATE 100 MG/1
TABLET, EXTENDED RELEASE ORAL
Qty: 24 TABLET | Refills: 0 | OUTPATIENT
Start: 2022-01-01

## 2022-01-01 RX ORDER — ONDANSETRON 4 MG/1
4 TABLET, ORALLY DISINTEGRATING ORAL EVERY 6 HOURS PRN
Status: DISCONTINUED | OUTPATIENT
Start: 2022-01-01 | End: 2022-01-01 | Stop reason: HOSPADM

## 2022-01-01 RX ORDER — VIT A/VIT C/VIT E/ZINC/COPPER 4296-226
CAPSULE ORAL
Qty: 28 CAPSULE | Refills: 11 | Status: SHIPPED | OUTPATIENT
Start: 2022-01-01

## 2022-01-01 RX ORDER — HYDRALAZINE HYDROCHLORIDE 25 MG/1
50 TABLET, FILM COATED ORAL 3 TIMES DAILY
Status: DISCONTINUED | OUTPATIENT
Start: 2022-01-01 | End: 2022-01-01 | Stop reason: HOSPADM

## 2022-01-01 RX ORDER — CLONIDINE HYDROCHLORIDE 0.1 MG/1
0.2 TABLET ORAL ONCE
Status: DISCONTINUED | OUTPATIENT
Start: 2022-01-01 | End: 2022-01-01 | Stop reason: HOSPADM

## 2022-01-01 RX ORDER — HYDRALAZINE HYDROCHLORIDE 100 MG/1
1300 TABLET, FILM COATED ORAL 2 TIMES DAILY
COMMUNITY
Start: 2022-01-01

## 2022-01-01 RX ORDER — METOPROLOL SUCCINATE 100 MG/1
100 TABLET, EXTENDED RELEASE ORAL ONCE
Status: DISCONTINUED | OUTPATIENT
Start: 2022-01-01 | End: 2022-01-01 | Stop reason: HOSPADM

## 2022-01-01 RX ORDER — HYDRALAZINE HYDROCHLORIDE 25 MG/1
50 TABLET, FILM COATED ORAL 2 TIMES DAILY
Status: DISCONTINUED | OUTPATIENT
Start: 2022-01-01 | End: 2022-01-01

## 2022-01-01 RX ADMIN — HYDRALAZINE HYDROCHLORIDE 50 MG: 25 TABLET, FILM COATED ORAL at 14:39

## 2022-01-01 RX ADMIN — HYDRALAZINE HYDROCHLORIDE 50 MG: 25 TABLET, FILM COATED ORAL at 12:10

## 2022-01-01 RX ADMIN — HYDRALAZINE HYDROCHLORIDE 50 MG: 25 TABLET, FILM COATED ORAL at 08:04

## 2022-01-01 RX ADMIN — CLONIDINE HYDROCHLORIDE 0.2 MG: 0.1 TABLET ORAL at 12:10

## 2022-01-01 RX ADMIN — HYDRALAZINE HYDROCHLORIDE 50 MG: 25 TABLET, FILM COATED ORAL at 20:50

## 2022-01-01 RX ADMIN — CX-024414 0.25 ML: 0.2 INJECTION, SUSPENSION INTRAMUSCULAR at 15:01

## 2022-01-01 RX ADMIN — CLONIDINE HYDROCHLORIDE 0.2 MG: 0.1 TABLET ORAL at 20:50

## 2022-01-01 RX ADMIN — CLONIDINE HYDROCHLORIDE 0.2 MG: 0.1 TABLET ORAL at 08:04

## 2022-01-01 ASSESSMENT — PAIN SCALES - GENERAL: PAINLEVEL: MODERATE PAIN (5)

## 2022-01-01 ASSESSMENT — ACTIVITIES OF DAILY LIVING (ADL): DEPENDENT_IADLS:: CLEANING;SHOPPING;MONEY MANAGEMENT;TRANSPORTATION

## 2022-01-10 ENCOUNTER — NURSE TRIAGE (OUTPATIENT)
Dept: FAMILY MEDICINE | Facility: OTHER | Age: 87
End: 2022-01-10
Payer: COMMERCIAL

## 2022-01-10 NOTE — TELEPHONE ENCOUNTER
SITUATION:   One a week ago got a piece of a kleenex in her right eye. Used over the counter drops to get the piece out.   The eye appears to slightly red. Patient states that she has blurry vision and is unable to see very well. However, today she can see a little bit. Patient states that if feels scratched, but denies pain.   Denies glasses or contacts.       HOME TREATMENTS:  OTC eye drops  Take BP Pills    PLAN:   Patient needs to see an ophthalmologist to have her eyes completely checked.       NESTOR CárdenasN, RN, PHN  [facility]  January 10, 2022    Reason for Disposition    Patient wants to be seen    Additional Information    Negative: Doesn't sound like FB in the eye    Negative: Foreign body is a chemical    Negative: Foreign body (FB) hit eye at high speed (e.g., small metallic chip from hammering, lawnmower, BB gun, explosion)    Negative: Foreign body (FB) stuck on eyeball    Negative: Sharp FB (even if FB was removed) and any pain present now    Negative: Eye has been washed out > 30 minutes ago and still feels like FB is still present    Negative: Blurred vision that persists >1 hour after irrigation (regardless of duration of flushing)    Negative: Eye pain that persists > 1 hour after irrigation (regardless of duration of flushing)    Negative: Tearing or blinking that persists > 1 hour since irrigation (regardless of duration of flushing)    Negative: Cloudy spot on the cornea (clear part of the eye)    Negative: Sounds like a serious injury to the triager    Negative: Severe eye pain    Negative: Yellow or green pus occurs    Negative: Contact lens stuck in eye and unable to remove using CARE ADVICE    Protocols used: EYE - FOREIGN BODY-A-OH

## 2022-01-26 ENCOUNTER — TRANSFERRED RECORDS (OUTPATIENT)
Dept: HEALTH INFORMATION MANAGEMENT | Facility: CLINIC | Age: 87
End: 2022-01-26
Payer: COMMERCIAL

## 2022-04-07 PROBLEM — N18.30 CKD (CHRONIC KIDNEY DISEASE) STAGE 3, GFR 30-59 ML/MIN (H): Status: ACTIVE | Noted: 2019-05-24
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
## 2022-04-07 PROBLEM — R29.6 MULTIPLE FALLS: Status: ACTIVE | Noted: 2022-01-01

## 2022-04-07 PROBLEM — S01.81XA FACIAL LACERATION, INITIAL ENCOUNTER: Status: ACTIVE | Noted: 2022-01-01
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
## 2022-04-07 PROBLEM — R44.1 VISUAL HALLUCINATION: Status: ACTIVE | Noted: 2022-01-01

## 2022-04-07 NOTE — ED TRIAGE NOTES
Patient tripped and fell on the way back from the bathroom at about 0100 this morning. She has a laceration above her left eye that she cleaned up herself. She called her daughter to let her know and daughter called EMS to have her checked out. Patient only complains of mild headache.

## 2022-04-07 NOTE — ED NOTES
ED Nursing criteria listed below was addressed during verbal handoff:     Abnormal vitals: Yes; slightly hypertension  Abnormal results: No  Med Reconciliation completed: Yes  Meds given in ED: No  Any Overdue Meds: No  Core Measures: No  Isolation: No  Special needs: Yes  Skin assessment: Yes; abrasion to R) shin, laceration to L) upper lid/brow    Observation Patient  Education provided: Yes. Hand-out with patient and daughter. Daina Mancera RN

## 2022-04-07 NOTE — PROGRESS NOTES
S-(situation): Patient registered to Observation. Patient arrived to room 247 via cart from ER    B-(background): Frequent falls at home    A-(assessment): Patient is A&O x4. VSS, blood pressure elevated. Patient ambulated from cart to bed with one assist, gait belt and walker. Pt denies dizziness or pain at this time. Pt has laceration that is scabbed above left eye.    R-(recommendations): Orders and observation goals reviewed with patient    Nursing Observation criteria listed below was met:    Skin issues/needs documented:Yes  Isolation needs addressed and Signage up: NA  Fall Prevention: Education given and documented: Yes  Education Assessment documented:Yes  Admission Education Documented: Yes  New medication patient education completed and documented (Possible Side Effects of Common Medications handout): Yes  OBS video/handout Reviewed & Documented: Yes  Allergies Reviewed: Yes  Medication Reconciliation Complete: Yes  Home medications if not able to send immediately home with family stored here: NA  Reminder note placed in discharge instructions of home meds: NA  Patient has discharge needs (If yes, please explain): Yes  Patient has been falling at home and lives at home alone.  Patient discharge preferences addressed and charted on white board:  Yes

## 2022-04-07 NOTE — PROGRESS NOTES
PRIMARY DIAGNOSIS: GENERALIZED WEAKNESS    OUTPATIENT/OBSERVATION GOALS TO BE MET BEFORE DISCHARGE  1. Orthostatic performed: No    2. Tolerating PO medications: Yes    3. Return to near baseline physical activity: Yes    4. Cleared for discharge by consultants (if involved): No    Discharge Planner Nurse   Safe discharge environment identified: No  Barriers to discharge: Yes-- still needs to work with therapy.        Entered by: Tracee Davis 04/07/2022 5:08 PM     Please review provider order for any additional goals.   Nurse to notify provider when observation goals have been met and patient is ready for discharge.

## 2022-04-07 NOTE — ED PROVIDER NOTES
"89 yr old signed over to me at change of shift by Dr. Mendoza.  Please see his note for further detail.  She fell in the kitchen a few hours ago striking her head.  She thought she fell in the bedroom but blood was in the kitchen.  Daughter thinks she likely fell asleep in the kitchen chair and fell out of the chair.  CT scan of the head ordered and is pending at the time of sign over.      Daughter wanted her knees and shoulder checked out.  She has a skin tear on the left shin from a previous fall.     I examined her knees.  There does not seem to be any traumatic injury.  There is an abrasion over the right shin.  There is some swelling to the left knee but she has good range of motion bilaterally.  X-rays show no acute injury.  Her shoulders have full range of motion without pain swelling bruising or deformity.    Daughter would like the patient admitted to the hospital to get \"checked out \".  On further discussion it sounds like the patient is not safe in her own home.  She is on multiple falls.  Yesterday she was hallucinating and the police were called only to find that there was nobody in the home although the patient thought there was.  She has not been sleeping much.  She states that she spends all night watching movies and does not really sleep.  Daughter states that she falls asleep in the kitchen chair.  She does not use a walker or another assistance device.  Home health care evaluation was ordered but the patient canceled that.    At this point the daughter does not feel safe bringing her home due to her instability on her feet, not using assistance device and multiple falls.  I discussed the case with Dr. Cramer for admission and he agrees.  Labs drawn, she has had a drop in her Hg from 12.1 jan 2020 to 9.9 today.      Andrade Sherwood MD  04/07/22 [id number]    "

## 2022-04-07 NOTE — ED PROVIDER NOTES
[facility] ED Provider Note   Patient: Nina Villalpando  MRN #:  [id number]  Date of Visit: April 7, 2022    CC:     Chief Complaint   Patient presents with     Fall     HPI:  Nina Villalpando is a 89 year old female who presented to the emergency department by EMS after she fell at home.  Patient lives by herself, and probably fell in the kitchen between 12 and 1 AM.  Patient waited a few hours before calling her daughter.  She states that she scooted on her but to get to the bathroom in the phone.  Her daughter received a phone call around 3:45 AM, and when she arrived, there was a pool of blood in the kitchen in front of the microwave.  Patient thought that she fell in her bedroom.  Her daughter states that she will fall asleep in the chair in the kitchen, and she has fallen there before.  She has the television on all the time.  Yesterday, the patient had hallucinations of a man in her house.  She eventually called the police, who did not find anyone in her house or anyone attempting to break in.  Patient had a vitreous bleed that occurred several weeks ago, and the retinal specialist thought that she had dry macular degeneration that became wet.  Patient has a mild headache and some neck and shoulder pain and stiffness.  She is not on any blood thinners.  Patient does not use a walker or a cane.  Her daughter has tried to set up home health assessment and physical therapy, but she would canceled those appointments.    Problem List:  Patient Active Problem List    Diagnosis Date Noted     CKD (chronic kidney disease) stage 3, GFR 30-59 ml/min (H) 05/24/2019     Priority: Medium     Hyperlipidemia, unspecified 01/22/2014     Priority: Medium     Diagnosis updated by automated process. Provider to review and confirm.       Aortic stenosis 11/01/2013     Priority: Medium     Health Care Home 12/28/2012     Priority: Medium     Oriana  JOANNA Yi-PHN  FPA / [facility]   [phone]    DX V65.8 REPLACED WITH 925[facility]ALTH CARE HOME (04/08/2013)       Hair loss 09/13/2012     Priority: Medium     Chronic rhinitis 04/17/2012     Priority: Medium     Essential hypertension 01/04/2012     Priority: Medium     Varicose veins of legs 11/29/2011     Priority: Medium     Anemia, unspecified type 12/08/2010     Priority: Medium     Primary osteoarthritis of both knees      Priority: Medium       Past Medical History:   Diagnosis Date     Carpal tunnel syndrome      Diverticulitis of colon (without mention of hemorrhage)(562.11)      Generalized osteoarthrosis, unspecified site      Hernia of unspecified site of abdominal cavity without mention of obstruction or gangrene      Other and unspecified hyperlipidemia      Unspecified essential hypertension      Unspecified sinusitis (chronic)        MEDS: biotin 1000 MCG TABS tablet  Blood Pressure Monitor KIT  Cholecalciferol (VITAMIN D) 1000 UNIT capsule  cloNIDine (CATAPRES) 0.2 MG tablet  COMPRESSION STOCKINGS  GLUCOSAMINE-CHONDROITIN OR TABS  hydrALAZINE (APRESOLINE) 50 MG tablet  metoprolol succinate ER (TOPROL-XL) 100 MG 24 hr tablet  MULTI-DAY OR TABS  order for DME  VITAMIN E 400 UNIT OR CAPS        ALLERGIES:    Allergies   Allergen Reactions     No Known Drug Allergies        Past Surgical History:   Procedure Laterality Date     CATARACT IOL, RT/LT  6/26/2008    left eye     CATARACT IOL, RT/LT  7/24/2008    right eye     COLONOSCOPY  04/16/2007     HERNIA REPAIR, UMBILICAL  09/27/2007    Incarcerated.     LASER YAG CAPSULOTOMY  5/22/2014    Procedure: LASER YAG CAPSULOTOMY;  Surgeon: Hebert Ledbetter MD;  Location:  OR     [facility] NONSPECIFIC PROCEDURE  1980    Hysterectomy due to menometrorrhagia.  Bladder repair.       Social History     Tobacco Use     Smoking status: Never Smoker     Smokeless tobacco: Never Used     Tobacco comment: no smokers in household    Substance Use Topics     Alcohol use: Yes     Comment: marcelo/waterx1-2/x1-2 q couple of months     Drug use: No         Review of Systems   Except as noted in HPI, all other systems were reviewed and are negative    Physical Exam     Vitals were reviewed  Patient Vitals for the past 12 hrs:   BP Temp Temp src Pulse Resp SpO2 Weight   04/07/22 0615 (!) 194/98 97.9  F (36.6  C) Oral 63 14 98 % 70.3 kg (155 lb)     GENERAL APPEARANCE: Alert, no distress  FACE: Bruising and swelling on the left lateral forehead; there is a 2-1/2-3 cm laceration to the left lateral eyebrow  EYES: Pupils are equal and reactive to light.  Extraocular muscles are intact  HENT: normal external exam  NECK: no midline tenderness; moderate kyphosis  RESP: normal respiratory effort; clear breath sounds bilaterally  ABD: soft, no tenderness; no rebound or guarding; bowel sounds are normal  EXT: No pedal edema, bandage over the right shin  SKIN: Slight bruising to the left lateral eyebrow  NEURO: no facial droop; no focal deficits, speech is normal        Available Lab/Imaging Results   No results found for this or any previous visit (from the past 24 hour(s)).         Impression     Final diagnoses:   Fall, initial encounter   Facial laceration         ED Course & Medical Decision Making   Nina Villalpando is a 89 year old female who presented to the emergency department by EMS after she fell at her home.  Patient thought that she fell in her bedroom, but her daughter states that there was a pool of blood in the kitchen.  She has been known to walk into the kitchen, and fall asleep in the chair.  She has fallen out of the chair in the kitchen before.  She may have fallen a couple of days ago, but there is no significant injuries.  She does not use a cane or walker, and despite efforts by the daughter to have home health assessment, patient has refused those visits.  Patient apparently had an episode yesterday of visual hallucination of a  man that was in her home.  Police came to check on her and there is no one there and no signs that her house had been forced in.  Patient is not on any blood thinners.  She apparently had a fall between 12 and 1:00 this morning, and waited a few hours before calling the daughter.  EMS was eventually called by the daughter and the patient was brought to the ER for further evaluation.  There is some concerns about possible strokes, leading to her decline.  Patient has a laceration on the left lateral eyebrow and face area without any active bleeding.  There is bruising to that area.  She has kyphoscoliosis of the cervical and thoracic spine but has no tenderness.  She was try to minimize some pain in her shoulders.  She has no chest pain or abdominal pain.  Patient has a bandage over the right shin from her fall couple days ago.    CT scan of the head and cervical spine was ordered.  Patient was signed out to Dr. Sherwood at the change of shift.      Disclaimer: This note consists of words and symbols derived from keyboarding and dictation using voice recognition software.  As a result, there may be errors that have gone undetected.  Please consider this when interpreting information found in this note.       Davdi Mendoza MD  04/07/22 0740

## 2022-04-07 NOTE — CONSULTS
"CLINICAL NUTRITION SERVICES - BRIEF NOTE    Consult received for \"Decreased oral intake x 6 month. Does not eat wholesome food as per daughter\" and a positive MST screen.    A full Nutrition Assessment will be deferred at this time as patient is currently observation status.      Pt can be referred to outpatient RD by primary care provider after discharge as appropriate.      Should patient's status change to Inpatient, we will be available for a full Nutrition Assessment with a consult.     Gildardo Grossman RDN, LD  Clinical Dietitian   Office: [phone]  Weekend Pager: [phone]    "

## 2022-04-08 NOTE — PROGRESS NOTES
PRIMARY DIAGNOSIS: GENERALIZED WEAKNESS    OUTPATIENT/OBSERVATION GOALS TO BE MET BEFORE DISCHARGE  1. Orthostatic performed: No    2. Tolerating PO medications: Yes    3. Return to near baseline physical activity: Yes    4. Cleared for discharge by consultants (if involved): No    Discharge Planner Nurse   Safe discharge environment identified: No  Barriers to discharge: No       Entered by: Tatum Ritchie 04/08/2022 6:08 AM     Please review provider order for any additional goals.   Nurse to notify provider when observation goals have been met and patient is ready for discharge.

## 2022-04-08 NOTE — PROGRESS NOTES
[facility] GERIATRICS    PRIMARY CARE PROVIDER AND CLINIC:  Kyra Dangelo MD, [address] [facility] [address] / [facility] [address]  Chief Complaint   Patient presents with     Hospital F/U      [facility] Medical Record Number:  [id number]  Place of Service where encounter took place:  [facility] [[id number]]    Nina Villalpando  is a 89 year old  (9/21/1930), admitted to the above facility from  [facility]. Hospital stay 4/7/22 through 4/8/22..   HPI:    Admitted to the hospital after suffering a ground-level fall.  Imaging of the head, cervical spine, and bilateral knees were negative.  Urine culture without growth.    4/11 doing well, started therapies    Nursing without acute concerns. Nina denied urinary symptoms. She happy to be doing therapies    CODE STATUS/ADVANCE DIRECTIVES DISCUSSION:  Full Code   ALLERGIES:  Allergies   Allergen Reactions     No Known Drug Allergies       PAST MEDICAL HISTORY:   Past Medical History:   Diagnosis Date     Carpal tunnel syndrome     bilateral     Diverticulitis of colon (without mention of hemorrhage)(562.11)     years ago     Generalized osteoarthrosis, unspecified site      Hernia of unspecified site of abdominal cavity without mention of obstruction or gangrene     hiatal hernia, umbilical hernia     Other and unspecified hyperlipidemia      Unspecified essential hypertension      Unspecified sinusitis (chronic)     sinus problems      PAST SURGICAL HISTORY:   has a past surgical history that includes NONSPECIFIC PROCEDURE (1980); colonoscopy (04/16/2007); hernia repair, umbilical (09/27/2007); cataract iol, rt/lt (6/26/2008); cataract iol, rt/lt (7/24/2008); and Laser YAG capsulotomy (5/22/2014).  FAMILY HISTORY: family history includes C.A.D. in her father and sister; Diabetes in her daughter, maternal grandmother, and mother; Heart Disease in her brother, brother, brother, and sister; Respiratory in  "her sister.  SOCIAL HISTORY:   reports that she has never smoked. She has never used smokeless tobacco. She reports current alcohol use. She reports that she does not use drugs.  Patient's living condition: lives alone    Post Discharge Medication Reconciliation Status: discharge medications reconciled, continue medications without change  No current facility-administered medications for this visit.     No current outpatient medications on file.     Facility-Administered Medications Ordered in Other Visits   Medication     cloNIDine (CATAPRES) tablet 0.2 mg     COVID-19 mRNA vacc (Moderna) injection 0.25 mL     hydrALAZINE (APRESOLINE) tablet 50 mg     melatonin tablet 1 mg     metoprolol succinate ER (TOPROL-XL) 24 hr tablet 150 mg     ondansetron (ZOFRAN-ODT) ODT tab 4 mg    Or     ondansetron (ZOFRAN) injection 4 mg       ROS:  10 point ROS of systems including Constitutional, Eyes, Respiratory, Cardiovascular, Gastroenterology, Genitourinary, Integumentary, Musculoskeletal, Psychiatric were all negative except for pertinent positives noted in my HPI.    Vitals:  BP (!) 140/68   Pulse 54   Temp 98.5  F (36.9  C)   Resp 18   Ht 1.499 m (4' 11\")   Wt 61.4 kg (135 lb 6.4 oz)   SpO2 95%   BMI 27.35 kg/m      Exam:  GENERAL APPEARANCE:  Alert, in no distress  ENT:  Mouth and posterior oropharynx normal, moist mucous membranes  RESP:  respiratory effort and palpation of chest normal, lungs clear to auscultation , no respiratory distress  CV:  Palpation and auscultation of heart done , regular rate and rhythm, no murmur, rub, or gallop, no edema  ABDOMEN:  normal bowel sounds, soft, nontender, no hepatosplenomegaly or other masses  M/S:   kyphosis, no joint swelling  SKIN:  3cm laceration above left crusted, yellow bruie left face, multipe small skin abrasion healing on arms and leg  NEURO:   Cranial nerves 2-12 are normal tested and grossly at patient's baseline  PSYCH:  oriented X 3, affect and mood " normal    Lab/Diagnostic data:    Most Recent 3 CBC's:Recent Labs   Lab Test 04/07/22  0838 01/06/20  1216 05/21/19  1229   WBC 5.9 6.5 7.7   HGB 9.9* 12.1 11.9   MCV 95 94 87    323 320     Most Recent 3 BMP's:Recent Labs   Lab Test 04/07/22  0838 06/09/20  0900 01/06/20  1216    135 138   POTASSIUM 3.8 4.3 4.1   CHLORIDE 99 103 103   CO2 27 25 28   BUN 26 25 19   CR 1.25* 1.10* 0.97   ANIONGAP 7 7 7   MOISES 8.9 9.0 9.2   GLC 89 108* 99     Most Recent TSH and T4:Recent Labs   Lab Test 04/07/22  0838   TSH 3.19       ASSESSMENT/PLAN:    (W19.XXXS) Fall, sequela  (primary encounter diagnosis)  (R29.6) Recurrent falls  Plan:   -Admitted to TCU for therapies and strengthening.  Recurrent falls over the past 6 months    (N18.32) Stage 3b chronic kidney disease (H)  Plan:   -Avoid nephrotoxic agents  -Renally dose medications    (I10) Essential hypertension  Plan:  -Established, well controlled  -Query orthostatic hypotension?  -Check orthostatic blood pressure  -Continue clonidine, hydralazine, metoprolol    (D64.9) Anemia, unspecified type  Plan: *  - ~ Two point drop in the three last months.  Query secondary to acute blood loss when she suffered facial laceration from this last fall.  -Upper endoscopy May 2018, medium size hiatal hernia, nonobstructing, Scharzki ring and no gross lesions  -Colonoscopy May 2018 noted diverticuli in the sigmoid colon and descending colon along with internal hemorrhoids      (R41.89) Cognitive decline  Plan:   -Query early dementia  -OT to eval     Total time spent with patient visit at the skilled nursing facility was 35 min including patient visit, review of past records, plan of care, medication reconciliation, POLST form completion, and counseling provided on fall prevention. Greater than 50% of total time spent with counseling and coordinating care due to medical complexity.     Electronically signed by:  Sherwin Veloz, CNP

## 2022-04-08 NOTE — PROVIDER NOTIFICATION
MD notified that new 1st degree AV block and bundle branch block with ST depression noted on tele. HR remains in 40's. 12 lead EKG ordered and completed. No new orders. Will continue to monitor.

## 2022-04-08 NOTE — CONSULTS
Care Management Initial Consult    General Information  Assessment completed with: Anastacio  Type of CM/SW Visit: Offer D/C Planning    Primary Care Provider verified and updated as needed: Yes   Readmission within the last 30 days:  No      Reason for Consult: discharge planning  Advance Care Planning:  No concerns noted        Communication Assessment  Patient's communication style: spoken language (English or Bilingual)    Hearing Difficulty or Deaf: no   Wear Glasses or Blind: no    Cognitive  Cognitive/Neuro/Behavioral: WDL                      Living Environment:   People in home: alone     Current living Arrangements: mobile home      Able to return to prior arrangements: no       Family/Social Support:  Care provided by: self, Daughter-Shelby   Provides care for: no one, unable/limited ability to care for self  Marital Status:          Description of Support System: Supportive, Involved    Support Assessment: Adequate family and caregiver support, Caregiver experiencing high stress, Limited social contact and support, Minimal outside structure and leisure time activities    Current Resources:   Patient receiving home care services: No     Community Resources: None  Equipment currently used at home: none  Supplies currently used at home: None    Employment/Financial:  Employment Status: retired        Financial Concerns: No concerns identified      Lifestyle & Psychosocial Needs:  Social Determinants of Health     Tobacco Use: Not on file   Alcohol Use: Not on file   Financial Resource Strain: Not on file   Food Insecurity: Not on file   Transportation Needs: Not on file   Physical Activity: Not on file   Stress: Not on file   Social Connections: Not on file   Intimate Partner Violence: Not on file   Depression: Not at risk     PHQ-2 Score: 0   Housing Stability: Not on file       Functional Status:  Prior to admission patient needed assistance:   Dependent ADLs:: Ambulation-walker: needs to remember  "to use it  Dependent IADLs:: Cleaning, Shopping, Money Management, Transportation  Assesssment of Functional Status: Not at  functional baseline, Needs placement in a SNF/TCF for rehabilitation    Mental Health Status:  Mental Health Status: No Current Concerns       Chemical Dependency Status:  Chemical Dependency Status: No Current Concerns             Values/Beliefs:  Spiritual, Cultural Beliefs, Baptist Practices, Values that affect care: no               Additional Information:  Care Transitions has been consulted for discharge planning.    SW met with the pt and daughter Shelby to introduce self/role, perform assessment, and discuss discharge options.     Shelby reported the Pt has been living in her own mobile home in [place]. No in-home services. Daughter Shelby has been her only/primary support person. No other children.  Shelby has limited availability-as her spouse/daughter are disabled and require a lot of attention and time from Shelby in the home.       Shelby tried to visit the Pt 1 x week. Will also order groceries through [employer] online for delivery. Pt has been doing her own laundry and limited housekeeping. Shelby is uncertain if the Pt has been taking her medication properly-states she is not on many meds.     Pt has had concerns with getting into the shower. Typically prefers sponge baths. Shelby has not felt there to be concerns with hygiene or cleanliness of the home.     Shelby did report that most significant concern has been the Pt's confusion. Daughter states the confusion started ~ last week. Is not aware if the Pt has been taking her medication properly.     At baseline the Pt ambulates independently, but uses the furniture for stability. Daughter states she \"should\" be using a walker but needs to remember to use it.     CM discussed discharge options: Home Care vs TCU   Shelby reported her preference for TCU placement with the goal of having the pt return back home with services. "     TCU facilities discussed, choice provided. Pt and Shelby requested [facility] as their 1st choice. Referrals placed.     CM received call back from Admission's Director--Edy, reporting an available TCU today at [facility]. Requested Pt receive booster prior to arrival.     CM informed the Pt and Dtr -Shelby who verbalized approval with discharge plan.   Shelby agreed to provide transport.       PLAN: Accepted to [facility]   (Main Phone: [phone] Admissions Phone: [phone] Fax: [phone])  RN Report # [phone]    Daughter Shelby to provide transport at 1545     [id number]      BRANDON Ramires

## 2022-04-08 NOTE — PROGRESS NOTES
Dr. De Jesus was notified of patients heart rate running in the 45-50s range. Notified that I was going to hold the metoprolol. Tele in place. Will continue to monitor.

## 2022-04-08 NOTE — PROGRESS NOTES
PRIMARY DIAGNOSIS: GENERALIZED WEAKNESS    OUTPATIENT/OBSERVATION GOALS TO BE MET BEFORE DISCHARGE  1. Orthostatic performed: No    2. Tolerating PO medications: Yes    3. Return to near baseline physical activity: Yes    4. Cleared for discharge by consultants (if involved): No    Discharge Planner Nurse   Safe discharge environment identified: No  Barriers to discharge: No       Entered by: Tatum Ritchie 04/08/2022 6:09 AM     Please review provider order for any additional goals.   Nurse to notify provider when observation goals have been met and patient is ready for discharge.

## 2022-04-08 NOTE — DISCHARGE SUMMARY
[facility]  Hospitalist Discharge Summary      Date of Admission:  4/7/2022  Date of Discharge:  4/8/2022  3:40 PM  Discharging Provider: Ivana Cramer MD  Discharge Service: Hospitalist Service    Discharge Diagnoses   Falls at home  Generalized weakness  Decreased appetite  Early dementia    Follow-ups Needed After Discharge   Follow-up Appointments     Follow Up and recommended labs and tests      Follow up with TCU physician in 3 days.  The following labs/tests are   recommended: BMP, CBC, UA with reflex.             Discharge Disposition   Discharged to Rehab, [facility]  Condition at discharge: Stable  Patient ready to discharge to a skilled nursing facility as soon as possible in order to create capacity for patients related to the COVID-19 pandemic.    Hospital Course      Nina Villalpando is a 89 year old female admitted on 4/7/2022. She was brought to ER due to fall at her home at night and being on kitchen floor for few hours before she eventually calling her daughter who checked on her and arranged ER transfer. She had a 3 cm laceration over Lt eyebrow region.  She was admitted for observation and evaluation by PT/OT and arrangement of either home health service or transfer to rehab due to recent falls and past 6 months of gradually progressive generalized weakness.    She was evaluated and TCU with rehab was recommended which was arranged.  The patient was transferred to [facility] transitional care unit in a stable condition.    Consultations This Hospital Stay   CARE MANAGEMENT / SOCIAL WORK IP CONSULT  PHYSICAL THERAPY ADULT IP CONSULT  OCCUPATIONAL THERAPY ADULT IP CONSULT  NUTRITION SERVICES ADULT IP CONSULT  OCCUPATIONAL THERAPY ADULT IP CONSULT    Code Status   Full Code    Time Spent on this Encounter   I, Ivana Cramer MD, personally saw the patient today and spent greater than 30 minutes discharging this patient.       Ivana Cramer MD  [facility]  [facility] MEDICAL SURGICAL  [address] [facility]   [place] [address]  Phone: [phone]  ______________________________________________________________________    Physical Exam   Vital Signs: Temp: 97.1  F (36.2  C) Temp src: Oral BP: 119/52 Pulse: 50   Resp: 16 SpO2: 96 % O2 Device: None (Room air)    Weight: 132 lbs 3.2 oz  General Appearance: In no acute distress.  Pleasant.  Alert and oriented to place person and situation and date  Respiratory: Clear to auscultation bilaterally  Cardiovascular: RRR  GI: Abdomen is benign  Skin: Few bruises of the extremity and healing superficial skin lacerations of lower extremity.   Other: No neurological focal deficit       Primary Care Physician   Kyra Dangelo    Discharge Orders      Primary Care - Care Coordination Referral      General info for SNF    Length of Stay Estimate: Short Term Care: Estimated # of Days <30  Condition at Discharge: Improving  Level of care:skilled (TCU)  Rehabilitation Potential: Good  Admission H&P remains valid and up-to-date: Yes  Recent Chemotherapy: N/A  Use Nursing Home Standing Orders: Yes     Follow Up and recommended labs and tests    Follow up with TCU physician in 3 days.  The following labs/tests are recommended: BMP, CBC, UA with reflex.     Reason for your hospital stay    Falls at home, hallucinations, decreased oral intake. weakness     Activity - Up ad jake     Activity - Up with assistive device     Activity - Up with nursing assistance     Encourage PO fluids     Fall precautions     Walker Order for DME - ONLY FOR DME    DME Documentation:   Describe the reason for need to support medical necessity: patient with frequent falls as well as fatigue, she would benefit from mobility aide to decrease falls, provide support while also providing a safe space to rest when she fatigues.     I, the undersigned, certify that the above prescribed supplies are medically necessary for this patient and is both reasonable and  necessary in reference to accepted standards of medical and necessary in reference to accepted standards of medical practice in the treatment of this patient's condition and is not prescribed as a convenience.     Diet    Follow this diet upon discharge: Orders Placed This Encounter      Regular Diet Adult       Significant Results and Procedures   Most Recent 3 CBC's:  Recent Labs   Lab Test 04/07/22  0838 01/06/20  1216 05/21/19  1229   WBC 5.9 6.5 7.7   HGB 9.9* 12.1 11.9   MCV 95 94 87    323 320     Most Recent 3 BMP's:  Recent Labs   Lab Test 04/07/22  0838 06/09/20  0900 01/06/20  1216    135 138   POTASSIUM 3.8 4.3 4.1   CHLORIDE 99 103 103   CO2 27 25 28   BUN 26 25 19   CR 1.25* 1.10* 0.97   ANIONGAP 7 7 7   MOISES 8.9 9.0 9.2   GLC 89 108* 99     Most Recent Urinalysis:  Recent Labs   Lab Test 04/07/22  1125 05/17/18  0255 05/03/18  1236   COLOR Straw   < > Yellow   APPEARANCE Clear   < > Clear   URINEGLC Negative   < > Negative   URINEBILI Negative   < > Negative   URINEKETONE Negative   < > Negative   SG 1.004   < > 1.010   UBLD Negative   < > Negative   URINEPH 7.0   < > 6.0   PROTEIN Negative   < > Negative   UROBILINOGEN  --   --  0.2   NITRITE Negative   < > Negative   LEUKEST Moderate*   < > Trace*   RBCU 0   < > O - 2   WBCU 18*   < > 0 - 5    < > = values in this interval not displayed.     Urine culture did not have any growth and was negative.  UTI was ruled out.    Results for orders placed or performed during the hospital encounter of 04/07/22   Head CT w/o contrast    Narrative    CT SCAN OF THE HEAD WITHOUT CONTRAST April 7, 2022 7:22 AM     HISTORY: Head trauma, minor (Age >= 65y).    TECHNIQUE: Axial images of the head and coronal reformations without  IV contrast material. Radiation dose for this scan was reduced using  automated exposure control, adjustment of the mA and/or kV according  to patient size, or iterative reconstruction technique.    COMPARISON: None.    FINDINGS:  The ventricles are normal in size and configuration. Mild to  moderate generalized brain parenchymal volume loss. Moderate to severe  extent of patchy and confluent nonspecific hypodensity in the cerebral  white matter, likely representing advanced chronic small vessel  ischemic changes. No definite CT findings of acute infarct. No  intracranial hemorrhage, extra-axial fluid collection, or significant  mass effect/herniation. There is a small circumscribed dome-shaped  calcified or ossified extra-axial lesion overlying the left parietal  lobe that is nonspecific, but could represent a small calcified  meningioma, a focal area of benign inner table calvarial hyperostosis,  benign calcification or ossification of the dura, or possibly a small  inner table calvarial osteoma. There is no significant mass effect  upon or edema involving the underlying brain parenchyma. Punctate  right anterior parafalcine calcification (series 2 image 22), and  similar appearing lesion in the left sylvian fissure (series 2 image  18), nonspecific, possibly representing vascular calcifications or old  punctate calcified emboli. There are atherosclerotic calcifications  involving the carotid siphons and vertebral arteries.    Bilateral lens implants. The visualized orbits otherwise appear  normal. There is mild mucosal thickening in the ethmoid sinuses.  Otherwise, the paranasal sinuses are clear. The mastoid air cells are  clear. The bony calvarium and bones of the skull base appear intact.  There appears to be left periorbital soft tissue swelling. Partially  visualized degenerative change involving the craniocervical junction.      Impression    IMPRESSION:  1. No CT findings of acute intracranial process.  2. Brain atrophy and presumed chronic small vessel ischemic changes,  as described.  3. Other chronic findings, as detailed in the body of the report.  Please see the body of the report for additional details.    ARIANA ELMORE MD          SYSTEM ID:  [id number]   Cervical spine CT w/o contrast    Narrative    CT CERVICAL SPINE WITHOUT CONTRAST April 7, 2022 7:22 AM     HISTORY: Neck trauma (Age >= 65y). Fall, head injury, neck stiffness  and pain.     TECHNIQUE: Axial images of the cervical spine were obtained without  intravenous contrast. Multiplanar reformations were performed.  Radiation dose for this scan was reduced using automated exposure  control, adjustment of the mA and/or kV according to patient size, or  iterative reconstruction technique.    COMPARISON: None.    FINDINGS: No acute fracture identified. Normal vertebral body heights.  Minimal degenerative anterolisthesis of C2 on C3, C3 on C4, and C4 on  C5, and mild anterolisthesis of T1 on T2 and T2 on T3 measuring  approximately 2-3 mm at both of those levels. This is most likely on a  degenerative basis. Alignment otherwise appears within normal limits.    Multilevel degenerative disc disease, including severe disc height  loss at C5-C6, C6-C7 and C7-T1 and lesser degrees of disc height loss  elsewhere. Multilevel uncovertebral spurring. Moderate to severe  multilevel degenerative facet disease. Advanced degenerative  arthropathy at the atlantoaxial joints, with secondary prominent  remodeling/thinning of the anterior arch of C1. Mild thickening of the  retro-odontoid ligamentous structures.    Diffuse disc osteophyte complexes at multiple levels, contributing to  varying degrees of multilevel spinal canal stenosis, appearing most  pronounced (at least moderate in degree) at C5-C6 and C6-C7. Moderate  bordering on moderate to severe left neural foraminal stenosis at  C4-C5 and C5-C6 and at least moderate right C4-C5 and C5-C6 neural  foraminal stenosis.    Bilateral carotid bifurcation atherosclerotic calcifications. A few  subcentimeter hypodense nodules in the thyroid gland without  aggressive features, not necessarily requiring follow-up by imaging  criteria. Diffuse  atherosclerotic calcification of the partially  visualized aortic arch. The paraspinous soft tissues otherwise appear  unremarkable.      Impression    IMPRESSION:  1. No acute fracture or posttraumatic malalignment of the cervical  spine.  2. Advanced multilevel degenerative changes, as described.    ARIANA ELMORE MD         SYSTEM ID:  [id number]   XR Knee Bilateral 3 Views    Narrative    XR KNEE BILATERAL 3 VIEWS  4/7/2022 8:24 AM     HISTORY: falls knee pain    COMPARISON: None.      Impression    IMPRESSION:      Right: No fractures are identified. Small to moderate size knee joint  effusion. Advanced degenerative changes of the medial compartment.  Moderate degenerative changes in the lateral and patellofemoral  compartment.     Left: No fractures are identified. Small-to-moderate size knee joint  effusion. Mild-to-moderate degenerative changes medial compartment.  Extensive degenerative changes and remodeling of the lateral  compartment. Moderate degenerative changes patellofemoral compartment.    CARROL IBARRA MD         SYSTEM ID:  [id number]       Discharge Medications   Current Discharge Medication List        CONTINUE these medications which have NOT CHANGED    Details   cloNIDine (CATAPRES) 0.2 MG tablet Take 0.2 mg by mouth 2 times daily      hydrALAZINE (APRESOLINE) 50 MG tablet Take 1 tablet (50 mg) by mouth 2 times daily  Qty: 270 tablet, Refills: 1    Associated Diagnoses: HTN, goal below 150/90      metoprolol succinate ER (TOPROL-XL) 100 MG 24 hr tablet TAKE 1 & 1/2 (ONE & ONE-HALF) TABLETS BY MOUTH AT BEDTIME  Qty: 135 tablet, Refills: 1    Associated Diagnoses: HTN, goal below 150/90      Blood Pressure Monitor KIT 1 Device daily Use to check your blood pressure every day. (Wrist monitor)  Qty: 1 kit, Refills: 0    Associated Diagnoses: Benign essential hypertension      COMPRESSION STOCKINGS Lower extremities compression stockings  Below the knees  Mild pressure 15-20 MMHG          Qty: 2  each, Refills: 0    Associated Diagnoses: Varicose veins of legs      MULTI-DAY OR TABS Refills: 0      order for DME Equipment being ordered: Blood pressure monitor  Qty: 1 Device, Refills: 0    Associated Diagnoses: HTN, goal below 150/90           Allergies   Allergies   Allergen Reactions    No Known Drug Allergies

## 2022-04-08 NOTE — PROGRESS NOTES
S-(situation): Patient discharged to [facility] via wheelchair with daughter    B-(background): Observation goals met     A-(assessment): VSS    R-(recommendations): Discharge instructions reviewed with patient. Listed belongings gathered and returned to patient.  Patient Education resolved: Yes  New medications-Pt. Has been educated about reason of use and side effects NA  Home medications returned to patient NA  Medication Bin checked and emptied on discharge Yes

## 2022-04-08 NOTE — PROGRESS NOTES
04/07/22 1300   Quick Adds   Type of Visit Initial PT Evaluation       Present no   Living Environment   People in Home alone   Current Living Arrangements mobile home   Living Environment Comments daughter lives in [facility]   Usual Activity Tolerance fair   Current Activity Tolerance fair   Regular Exercise No   Equipment Currently Used at Home none   Fall history within last six months yes   Number of times patient has fallen within last six months 3  (patient unsure of mechanism of falls or frequency)   General Information   Onset of Illness/Injury or Date of Surgery 04/07/22   Referring Physician Ivana Cramer MD   Patient/Family Therapy Goals Statement (PT) to go home   Pertinent History of Current Problem (include personal factors and/or comorbidities that impact the POC) Admitted for observation 4/7 after fall at home.    Existing Precautions/Restrictions no known precautions/restrictions   General Observations OA B knees, HTN, CKD stage 3, multiple falls at home, recent hallucination   Cognition   Orientation Status (Cognition) person;place;situation   Pain Assessment   Patient Currently in Pain Yes, see Vital Sign flowsheet   Integumentary/Edema   Integumentary/Edema other (describe)   Integumentary/Edema Comments left side facial laceration   Posture    Posture Kyphosis;Forward head position   PT Discharge Planning   PT Discharge Recommendation (DC Rec) home with assist;Transitional Care Facility   PT Rationale for DC Rec patient currently unable to complete transfers and ambulation with independence to return home independently, spoke with patient about possibly being able to discharge to daughter or other family home for 24/7 assistance with home health PT, due to frequent falls may benefit from short skilled stay in TCU for improved safety and independence; consider lifeline or other communication device on person at all times if going back to independent living   PT  Brief overview of current status patient previously living home alone without assistive device for mobility; patient currently Mod [place] for bed mobility, sit<>stand transfers with supervision and strong use of posterior push, ambulation with CGA with front wheeled walker   Total Evaluation Time   Total Evaluation Time (Minutes) 35   Physical Therapy Goals   PT Frequency Daily   PT Predicated Duration/Target Date for Goal Attainment 04/12/22   PT Goals Transfers;Gait;Stairs   PT: Transfers Modified independent;Sit to/from stand;Assistive device   PT: Gait Modified independent;Rolling walker;100 feet   PT: Stairs Modified independent;4 stairs;Rail on left;Rail on right       Kailyn Matthews, PT, DPT, OCS, CSCS  [facility]  [phone]

## 2022-04-08 NOTE — PROGRESS NOTES
04/08/22 1200   Quick Adds   Type of Visit Initial Occupational Therapy Evaluation       Present no   Living Environment   People in Home alone   Current Living Arrangements mobile home   Home Accessibility stairs to enter home   Number of Stairs, Main Entrance 4   Stair Railings, Main Entrance railings safe and in good condition   Transportation Anticipated family or friend will provide   Self-Care   Usual Activity Tolerance fair   Current Activity Tolerance fair   Regular Exercise No   Equipment Currently Used at Home none   Fall history within last six months yes   Number of times patient has fallen within last six months 3   Activity/Exercise/Self-Care Comment none   Instrumental Activities of Daily Living (IADL)   Previous Responsibilities meal prep;housekeeping;medication management;laundry;shopping   IADL Comments Family can provide assistance on a weekly basis but not daily basis    General Information   Onset of Illness/Injury or Date of Surgery 04/08/22   Referring Physician Ivana Cramer MD   Patient/Family Therapy Goal Statement (OT) return home    Existing Precautions/Restrictions fall   Left Upper Extremity (Weight-bearing Status) full weight-bearing (FWB)   Right Upper Extremity (Weight-bearing Status) full weight-bearing (FWB)   Left Lower Extremity (Weight-bearing Status) full weight-bearing (FWB)   Right Lower Extremity (Weight-bearing Status) full weight-bearing (FWB)   General Observations and Info Patient admitted under observation status post-fall in home setting. PMH OA B knees, HTN, CKD stage 3, multiple falls at home, recent hallucinations/onset of confusion. OT orders: self care deficits as well as SLUMS for cognitive deficit screening    Cognitive Status Examination   Orientation Status person;place;time   Affect/Mental Status (Cognitive) WNL   Follows Commands WNL   Safety Deficit at risk behavior observed;insight into deficits/self-awareness   Memory Deficit  "short-term memory   Cognitive Status Comments Completed SLUMS this date; scored 24/30 on cognitive screening. Anticipate close or near baseline due to age. STM impairment noted. Reporting \"having memory issues for some time now\". Anwsering home saftey questions with success.    Sensory   Sensory Quick Adds No deficits were identified   Posture   Posture not impaired   Range of Motion Comprehensive   General Range of Motion upper extremity range of motion deficits identified   General Upper Extremity Assessment (Range of Motion)   Comment: Upper Extremity ROM Decreased shoulder flexion bilaterally due to \"soreness\" from the fall. Impacting independence level with dressing.    Strength Comprehensive (MMT)   General Manual Muscle Testing (MMT) Assessment no strength deficits identified   Muscle Tone Assessment   Muscle Tone Quick Adds No deficits were identified   Coordination   Upper Extremity Coordination No deficits were identified   Coordination Comments Patient reporting ongoing \"stiffness\" impacting overall coordiantion and targeted reaching components   Transfers   Transfer Comments defer to PT notes   Balance   Balance Comments Defer to PT notes   Activities of Daily Living   BADL Assessment/Intervention lower body dressing;upper body dressing;grooming   Upper Body Dressing Assessment/Training   [place] Level (Upper Body Dressing) minimum assist (75% patient effort)   Lower Body Dressing Assessment/Training   [place] Level (Lower Body Dressing) minimum assist (75% patient effort)   Grooming Assessment/Training   [place] Level (Grooming) set up   Clinical Impression   Criteria for Skilled Therapeutic Interventions Met (OT) Yes, treatment indicated   OT Diagnosis Impaired ADLs   OT Problem List-Impairments impacting ADL problems related to;activity tolerance impaired;cognition;range of motion (ROM);pain   Assessment of Occupational Performance 3-5 Performance Deficits   Identified Performance " Deficits Dressing, grooming, bathing, cog, home mangement, ROM, strength    Planned Therapy Interventions (OT) ADL retraining;strengthening;ROM;risk factor education;progressive activity/exercise;home program guidelines   Intervention Comments Patient would benefit from skilled IP OT services during stay to progress towards baseline level of independence for ADLs   Clinical Decision Making Complexity (OT) moderate complexity   Anticipated Equipment Needs Upon Discharge (OT) walker, standard   Risk & Benefits of therapy have been explained care plan/treatment goals reviewed;evaluation/treatment results reviewed;risks/benefits reviewed;current/potential barriers reviewed;participants voiced agreement with care plan;participants included;patient;daughter   Clinical Impression Comments During OT evaluation, patient demonstrating below baseline for ADLs and scored 24/30 on SLUMS indiciative of mild cognitive impairment. Poor insihgt into needing assistance with home managment and ADL tasks. PT recommending using walker upon discharge, patient reporting not using one in home setting before and is increased risk for falls.    OT Discharge Planning   OT Discharge Recommendation (DC Rec) Transitional Care Facility;home with assist   OT Rationale for DC Rec Patient previously from home alone in mobile home. Per caregiver present reporting patient having difficulty manging home, making meals for herself, and take care of personal needs. Having frequent falls in home setting. Currently required min A for dressing needs and set-up for grooming. Needing walker for safe mobilty and has not used one in home setting previously. Would benefit from TCU placement to address previously stated deficits before returning home for increased saftey. If TCU is not an option, would recommend family assistance for cares and life alert for saftey in home setting. Currently patient agreeable to TCU placement as dicussed.    OT Brief overview of  current status 24/30 on SLUMS, min assistance for dressing, limited insight into current deficits with frequent falls in home setting. Below baseline level of independence       Thank you for the referral.   TUAN Lennon/L   [facility]    Email: [email or web address]  Phone: [phone]

## 2022-04-08 NOTE — PROGRESS NOTES
04/07/22 1300   Quick Adds   Type of Visit Initial PT Evaluation       Present no   Living Environment   People in Home alone   Current Living Arrangements mobile home   Home Accessibility stairs to enter home   Number of Stairs, Main Entrance 4   Stair Railings, Main Entrance railings safe and in good condition   Transportation Anticipated family or friend will provide   Living Environment Comments 0   Self-Care   Usual Activity Tolerance fair   Current Activity Tolerance fair   Regular Exercise No   Equipment Currently Used at Home none   Fall history within last six months yes   Number of times patient has fallen within last six months 3  (patient unsure of mechanism of falls or frequency)   General Information   Onset of Illness/Injury or Date of Surgery 04/07/22   Referring Physician Ivana Cramer MD   Patient/Family Therapy Goals Statement (PT) to go home   Pertinent History of Current Problem (include personal factors and/or comorbidities that impact the POC) Admitted for observation 4/7 after fall at home.    Existing Precautions/Restrictions no known precautions/restrictions   General Observations OA B knees, HTN, CKD stage 3, multiple falls at home, recent hallucination   Cognition   Orientation Status (Cognition) person;place;situation   Pain Assessment   Patient Currently in Pain Yes, see Vital Sign flowsheet   Integumentary/Edema   Integumentary/Edema other (describe)   Integumentary/Edema Comments left side facial laceration   Posture    Posture Kyphosis;Forward head position   Bed Mobility   Bed Mobility rolling left;rolling right;scooting/bridging;supine-sit;sit-supine   Rolling Left [place] (Bed Mobility) independent   Rolling Right [place] (Bed Mobility) independent   Scooting/Bridging [place] (Bed Mobility) verbal cues   Supine-Sit [place] (Bed Mobility) contact guard   Sit-Supine [place] (Bed Mobility) contact guard   Impairments Contributing to  Impaired Bed Mobility impaired motor control;decreased strength   Transfers   Transfers sit-stand transfer   Transfer Safety Concerns Noted decreased balance during turns;decreased sequencing ability   Impairments Contributing to Impaired Transfers impaired balance;decreased strength;decreased ROM   Sit-Stand Transfer   Sit-Stand [place] (Transfers) contact guard   Assistive Device (Sit-Stand Transfers) walker, front-wheeled   Gait/Stairs (Locomotion)   [place] Level (Gait) contact guard   Assistive Device (Gait) walker, rolling platform   Distance in Feet (Required for LE Total Joints) 80   Pattern (Gait) step-to   Deviations/Abnormal Patterns (Gait) jairo decreased;stride length decreased;weight shifting decreased   Comment, (Gait/Stairs) Pt demonstrates flexed posture in ambulation, demonstrates significant improvement in stability and step length and speed with FWW.    Clinical Impression   Criteria for Skilled Therapeutic Intervention Yes, treatment indicated   PT Diagnosis (PT) generalized weakness, gait abnormalities   Influenced by the following impairments decreased dynamic, static balance, generalized weakness, decreased shoulder AROM and postural stability   Functional limitations due to impairments decreased bed mobility, transfers, ambulation for household and community distances   Clinical Presentation (PT Evaluation Complexity) Stable/Uncomplicated   Clinical Presentation Rationale clinical judgement, chart review   Clinical Decision Making (Complexity) low complexity   Planned Therapy Interventions (PT) balance training;bed mobility training;home exercise program;lumbar stabilization;manual therapy techniques;motor coordination training;neuromuscular re-education;patient/family education;stair training;strengthening;transfer training   Anticipated Equipment Needs at Discharge (PT) walker, rolling  (if patient going home with assist)   Risk & Benefits of therapy have been explained  evaluation/treatment results reviewed;care plan/treatment goals reviewed;risks/benefits reviewed;current/potential barriers reviewed;participants voiced agreement with care plan   Clinical Impression Comments The patient is a 89 yo female who was admitted under observation to hospital on 4/7 after a fall at home.  Patient lives at home alone and does not use AD with ambulation, patient does not drive or leave home regularly.  Currently patient requires CGA with most mobility and requires FWW for stability with ambulation.  Patient demonstrates increased falls risk and is not appropriate to return home alone without 24/7 assist.  Patient may require STR stay due frequent falls and increased injury frequency.    PT Discharge Planning   PT Discharge Recommendation (DC Rec) home with assist;Transitional Care Facility   PT Rationale for DC Rec patient currently unable to complete transfers and ambulation with independence to return home independently, spoke with patient about possibly being able to discharge to daughter or other family home for 24/7 assistance with home health PT, due to frequent falls may benefit from short skilled stay in TCU for improved safety and independence; consider lifeline or other communication device on person at all times if going back to independent living   PT Brief overview of current status patient previously living home alone without assistive device for mobility; patient currently Mod [place] for bed mobility, sit<>stand transfers with supervision and strong use of posterior push, ambulation with CGA with front wheeled walker   Total Evaluation Time   Total Evaluation Time (Minutes) 35   Physical Therapy Goals   PT Frequency Daily   PT Predicated Duration/Target Date for Goal Attainment 04/12/22   PT Goals Transfers;Gait;Stairs   PT: Transfers Modified independent;Sit to/from stand;Assistive device   PT: Gait Modified independent;Rolling walker;100 feet   PT: Stairs Modified  independent;4 stairs;Rail on left;Rail on right       Kailyn Matthews PT, DPT, OCS, CSCS  [facility]  [phone]

## 2022-04-08 NOTE — PROGRESS NOTES
SPIRITUAL HEALTH SERVICES  [facility]  Progress Note    REFERRAL SOURCE: Self    NOTE: I visited Nina briefly during Morning Rounds.  She is feeling better and was open to my introduction.  I offered continued availability today for emotional and spiritual support. She is [employer].  Nina hopes to discharge home today.  I offered her a blessing    PLAN: I will continue to follow patient and be available for any ongoing support needs until her discharge.    Ryan Olivera, Ph.d,   Spiritual Health Services  [facility]  [address] [place] [address]    Office: [phone]   [email or web address]

## 2022-04-09 NOTE — PLAN OF CARE
Physical Therapy Discharge Summary    Reason for therapy discharge:    Discharged to transitional care facility.    Progress towards therapy goal(s). See goals on Care Plan in [facility] electronic health record for goal details.  Goals partially met.  Barriers to achieving goals:   discharge from facility.    Therapy recommendation(s):    Continued therapy is recommended.  Rationale/Recommendations:  to address impaired mobility, weakness, high risk of falls and safety awareness for improved safety in the home.    Thank you for your referral.  Simran Carnes, PT, DPT, ATC, LAT    [facility]  O: [phone]  E: [email or web address]

## 2022-04-11 NOTE — PROGRESS NOTES
Clinic Care Coordination Contact  [facility]/Voicemail     Clinical Data: Care Coordinator Outreach  Outreach attempted x 1. No VM available.  Plan: Care Coordinator will try to reach patient again in 1-2 business days.    Transition: TCU discharge     Clinical Staff have discussed the Care Coordination Referral with the patient and/or caregiver: Yes     Additional Information: [facility]      Radha [facility] Health Worker  [facility] Care Coordination   Office: [phone]

## 2022-04-11 NOTE — LETTER
4/11/2022        RE: Nina Villalpando  [address]  [facility] [address]        [facility] GERIATRICS    PRIMARY CARE PROVIDER AND CLINIC:  Kyra Dangelo MD, [address] / [facility] [address]  Chief Complaint   Patient presents with     Hospital F/U      [place] Medical Record Number:  [id number]  Place of Service where encounter took place:  [facility] [[id number]]    Nina Villalpando  is a 89 year old  (9/21/1930), admitted to the above facility from  [facility]. Hospital stay 4/7/22 through 4/8/22..   HPI:    Admitted to the hospital after suffering a ground-level fall.  Imaging of the head, cervical spine, and bilateral knees were negative.  Urine culture without growth.    4/11 doing well, started therapies    Nursing without acute concerns. Nina denied urinary symptoms. She happy to be doing therapies    CODE STATUS/ADVANCE DIRECTIVES DISCUSSION:  Full Code   ALLERGIES:  Allergies   Allergen Reactions     No Known Drug Allergies       PAST MEDICAL HISTORY:   Past Medical History:   Diagnosis Date     Carpal tunnel syndrome     bilateral     Diverticulitis of colon (without mention of hemorrhage)(562.11)     years ago     Generalized osteoarthrosis, unspecified site      Hernia of unspecified site of abdominal cavity without mention of obstruction or gangrene     hiatal hernia, umbilical hernia     Other and unspecified hyperlipidemia      Unspecified essential hypertension      Unspecified sinusitis (chronic)     sinus problems      PAST SURGICAL HISTORY:   has a past surgical history that includes NONSPECIFIC PROCEDURE (1980); colonoscopy (04/16/2007); hernia repair, umbilical (09/27/2007); cataract iol, rt/lt (6/26/2008); cataract iol, rt/lt (7/24/2008); and Laser YAG capsulotomy (5/22/2014).  FAMILY HISTORY: family history includes C.A.D. in her father and sister; Diabetes in her daughter, maternal  "grandmother, and mother; Heart Disease in her brother, brother, brother, and sister; Respiratory in her sister.  SOCIAL HISTORY:   reports that she has never smoked. She has never used smokeless tobacco. She reports current alcohol use. She reports that she does not use drugs.  Patient's living condition: lives alone    Post Discharge Medication Reconciliation Status: discharge medications reconciled, continue medications without change  No current facility-administered medications for this visit.     No current outpatient medications on file.     Facility-Administered Medications Ordered in Other Visits   Medication     cloNIDine (CATAPRES) tablet 0.2 mg     COVID-19 mRNA vacc (Moderna) injection 0.25 mL     hydrALAZINE (APRESOLINE) tablet 50 mg     melatonin tablet 1 mg     metoprolol succinate ER (TOPROL-XL) 24 hr tablet 150 mg     ondansetron (ZOFRAN-ODT) ODT tab 4 mg    Or     ondansetron (ZOFRAN) injection 4 mg       ROS:  10 point ROS of systems including Constitutional, Eyes, Respiratory, Cardiovascular, Gastroenterology, Genitourinary, Integumentary, Musculoskeletal, Psychiatric were all negative except for pertinent positives noted in my HPI.    Vitals:  BP (!) 140/68   Pulse 54   Temp 98.5  F (36.9  C)   Resp 18   Ht 1.499 m (4' 11\")   Wt 61.4 kg (135 lb 6.4 oz)   SpO2 95%   BMI 27.35 kg/m      Exam:  GENERAL APPEARANCE:  Alert, in no distress  ENT:  Mouth and posterior oropharynx normal, moist mucous membranes  RESP:  respiratory effort and palpation of chest normal, lungs clear to auscultation , no respiratory distress  CV:  Palpation and auscultation of heart done , regular rate and rhythm, no murmur, rub, or gallop, no edema  ABDOMEN:  normal bowel sounds, soft, nontender, no hepatosplenomegaly or other masses  M/S:   kyphosis, no joint swelling  SKIN:  3cm laceration above left crusted, yellow bruie left face, multipe small skin abrasion healing on arms and leg  NEURO:   Cranial nerves 2-12 " are normal tested and grossly at patient's baseline  PSYCH:  oriented X 3, affect and mood normal    Lab/Diagnostic data:    Most Recent 3 CBC's:Recent Labs   Lab Test 04/07/22  0838 01/06/20  1216 05/21/19  1229   WBC 5.9 6.5 7.7   HGB 9.9* 12.1 11.9   MCV 95 94 87    323 320     Most Recent 3 BMP's:Recent Labs   Lab Test 04/07/22  0838 06/09/20  0900 01/06/20  1216    135 138   POTASSIUM 3.8 4.3 4.1   CHLORIDE 99 103 103   CO2 27 25 28   BUN 26 25 19   CR 1.25* 1.10* 0.97   ANIONGAP 7 7 7   MOISES 8.9 9.0 9.2   GLC 89 108* 99     Most Recent TSH and T4:Recent Labs   Lab Test 04/07/22  0838   TSH 3.19       ASSESSMENT/PLAN:    (W19.XXXS) Fall, sequela  (primary encounter diagnosis)  (R29.6) Recurrent falls  Plan:   -Admitted to TCU for therapies and strengthening.  Recurrent falls over the past 6 months    (N18.32) Stage 3b chronic kidney disease (H)  Plan:   -Avoid nephrotoxic agents  -Renally dose medications    (I10) Essential hypertension  Plan:  -Established, well controlled  -Query orthostatic hypotension?  -Check orthostatic blood pressure  -Continue clonidine, hydralazine, metoprolol    (D64.9) Anemia, unspecified type  Plan: *  - ~ Two point drop in the three last months.  Query secondary to acute blood loss when she suffered facial laceration from this last fall.  -Upper endoscopy May 2018, medium size hiatal hernia, nonobstructing, Scharzki ring and no gross lesions  -Colonoscopy May 2018 noted diverticuli in the sigmoid colon and descending colon along with internal hemorrhoids      (R41.89) Cognitive decline  Plan:   -Query early dementia  -OT to eval     Total time spent with patient visit at the skilled nursing facility was 35 min including patient visit, review of past records, plan of care, medication reconciliation, POLST form completion, and counseling provided on fall prevention. Greater than 50% of total time spent with counseling and coordinating care due to medical complexity.      Electronically signed by:  Sherwin Veloz, DANIELLE                        Sincerely,        Sherwin Veloz, CNP

## 2022-04-11 NOTE — LETTER
[facility] CARE COORDINATION  [address]  [facility] [address]    April 12, 2022    Nina Villalpando  [address] [facility] [address]      Dear Nina,    I am a clinic community health worker who works with Kyra Dangelo MD at [facility]. I have been trying to reach you recently to introduce Clinic Care Coordination and to see if there was anything I could assist you with.  Below is a description of clinic care coordination and how I can further assist you.      The clinic care coordination team is made up of a registered nurse,  and community health worker who understand the health care system. The goal of clinic care coordination is to help you manage your health and improve access to the health care system in the most efficient manner. The team can assist you in meeting your health care goals by providing education, coordinating services, strengthening the communication among your providers and supporting you with any resource needs.    Please feel free to contact the Community Health Worker Bernadine at [phone] with any questions or concerns. We are focused on providing you with the highest-quality healthcare experience possible and that all starts with you.     Sincerely,     Radha Uribe  Community Health Worker  [facility] Care Coordination   Office: [phone]

## 2022-04-12 NOTE — PROGRESS NOTES
[facility]      OUTPATIENT PHYSICAL THERAPY EVALUATION  PLAN OF TREATMENT FOR OUTPATIENT REHABILITATION  (COMPLETE FOR INITIAL CLAIMS ONLY)  Patient's Last Name, First Name, M.I.  YOB: 1930  Nina Villalpando                        Provider's Name  [facility] Medical Record No.  [id number]                               Onset Date:  04/07/22   Start of Care Date:    4/7/22     Type:     _X_PT   ___OT   ___SLP Medical Diagnosis:   Generalized weakness, increased falls                        PT Diagnosis:  generalized weakness, gait abnormalities   Visits from SOC:  1   _________________________________________________________________________________  Plan of Treatment/Functional Goals    Planned Interventions: balance training, bed mobility training, home exercise program, lumbar stabilization, manual therapy techniques, motor coordination training, neuromuscular re-education, patient/family education, stair training, strengthening, transfer training     Goals: See Physical Therapy Goals on Care Plan in [employer] electronic health record.    Therapy Frequency: Daily  Predicted Duration of Therapy Intervention: 04/12/22  _________________________________________________________________________________    I CERTIFY THE NEED FOR THESE SERVICES FURNISHED UNDER        THIS PLAN OF TREATMENT AND WHILE UNDER MY CARE     (Physician co-signature of this document indicates review and certification of the therapy plan).               ,      Referring Physician: Ivana Cramer MD            Initial Assessment        See Physical Therapy evaluation dated   in Epic electronic health record.

## 2022-04-12 NOTE — PROGRESS NOTES
Clinic Care Coordination Contact  [facility]/Voicemail     Clinical Data: Care Coordinator Outreach  Outreach attempted x 2. No VM Available.  Plan: Care Coordinator sent care coordination introduction letter on 4/13/22 via mail. Care Coordinator will do no further outreaches at this time.    Radha Uribe  Community Health Worker  [facility] Care Coordination   Office: [phone]

## 2022-04-19 NOTE — PROGRESS NOTES
"[facility] GERIATRICS    Chief Complaint   Patient presents with     RECHECK     HPI:  Nina Villalpando is a 89 year old  (9/21/1930), who [facility] [[id number]].     Brief hospitalization summary  Admitted to the hospital after suffering a ground-level fall.  Imaging of the head, cervical spine, and bilateral knees were negative.  Urine culture without growth.     4/11 doing well, started therapies  4/19 working with therapies, SLUM 16/20, SBP elevated 112-180    Nursing without acute concerns.  Her blood pressure has been elevated systolic blood pressure ranging 112-180.  She denied headache and chest pain.  Her slums  16/30    Allergies, and PMH/PSH reviewed in [facility] today.  REVIEW OF SYSTEMS:  4 point ROS including Respiratory, CV, GI and , other than that noted in the HPI,  is negative    Objective:   BP (!) 150/67   Pulse 60   Temp 98.2  F (36.8  C)   Resp 18   Ht 1.499 m (4' 11\")   Wt 62.1 kg (136 lb 12.8 oz)   SpO2 96%   BMI 27.63 kg/m    GENERAL APPEARANCE:  Alert, in no distress  ENT:  Mouth and posterior oropharynx normal, moist mucous membranes, [place]  RESP:  respiratory effort and palpation of chest normal, lungs clear to auscultation , no respiratory distress  CV:  Palpation and auscultation of heart done , regular rate and rhythm, no murmur, rub, or gallop, no edema  ABDOMEN:  normal bowel sounds, soft, nontender, no hepatosplenomegaly or other masses  M/S:   no gross deformities  SKIN:  no rash  NEURO:   alert, NFD  PSYCH:  affect and mood normal    Recent Results (from the past 240 hour(s))   CBC with platelets    Collection Time: 04/11/22  7:28 AM   Result Value Ref Range    WBC Count 4.7 4.0 - 11.0 10e3/uL    RBC Count 2.99 (L) 3.80 - 5.20 10e6/uL    Hemoglobin 9.7 (L) 11.7 - 15.7 g/dL    Hematocrit 28.5 (L) 35.0 - 47.0 %    MCV 95 78 - 100 fL    MCH 32.4 26.5 - 33.0 pg    MCHC 34.0 31.5 - 36.5 g/dL    " RDW 13.8 10.0 - 15.0 %    Platelet Count 242 150 - 450 10e3/uL   Basic metabolic panel    Collection Time: 04/11/22  7:28 AM   Result Value Ref Range    Sodium 134 133 - 144 mmol/L    Potassium 3.9 3.4 - 5.3 mmol/L    Chloride 103 94 - 109 mmol/L    Carbon Dioxide (CO2) 26 20 - 32 mmol/L    Anion Gap 5 3 - 14 mmol/L    Urea Nitrogen 28 7 - 30 mg/dL    Creatinine 1.16 (H) 0.52 - 1.04 mg/dL    Calcium 8.7 8.5 - 10.1 mg/dL    Glucose 87 70 - 99 mg/dL    GFR Estimate 44 (L) >60 mL/min/1.73m2   UA reflex to Microscopic and Culture    Collection Time: 04/11/22  2:15 PM    Specimen: Urinary Bladder; Urine   Result Value Ref Range    Color Urine Yellow Colorless, Straw, Light Yellow, Yellow    Appearance Urine Slightly Cloudy (A) Clear    Glucose Urine Negative Negative mg/dL    Bilirubin Urine Negative Negative    Ketones Urine Negative Negative mg/dL    Specific Gravity Urine 1.011 1.003 - 1.035    Blood Urine Negative Negative    pH Urine 6.0 5.0 - 7.0    Protein Albumin Urine Negative Negative mg/dL    Urobilinogen Urine Normal Normal, 2.0 mg/dL    Nitrite Urine Negative Negative    Leukocyte Esterase Urine Small (A) Negative    Bacteria Urine Few (A) None Seen /HPF    RBC Urine 0 <=2 /HPF    WBC Urine 5 <=5 /HPF    Squamous Epithelials Urine <1 <=1 /HPF       Assessment/Plan:    (W19.XXXS) Fall, sequela  (primary encounter diagnosis)  (R29.6) Recurrent falls  Plan:   -No reported falls since admission to TCU.  Continue with therapies.  Continue fall prevention strategies    (I10) Essential hypertension  Plan:   -Systolic blood pressure elevated 112- 180 currently on clonidine, hydralazine, metoprolol succinate.  Check blood pressure 1 hour after all blood pressure medications have been given x1 week    (R41.89) Cognitive decline  Plan:   -She is alert and orientated.  Slums score 16 out of 30      Electronically signed by: Sherwin Veloz, CNP

## 2022-04-20 NOTE — LETTER
"    4/20/2022        RE: Nina Villalpando  [address] [facility] [address]        [facility]    Chief Complaint   Patient presents with     RECHECK     HPI:  Nina Villalpando is a 89 year old  (9/21/1930), who [facility] [[id number]].     Brief hospitalization summary  Admitted to the hospital after suffering a ground-level fall.  Imaging of the head, cervical spine, and bilateral knees were negative.  Urine culture without growth.     4/11 doing well, started therapies  4/19 working with therapies, SLUM 16/20, SBP elevated 112-180    Nursing without acute concerns.  Her blood pressure has been elevated systolic blood pressure ranging 112-180.  She denied headache and chest pain.  Her slums  16/30    Allergies, and PMH/PSH reviewed in EPIC today.  REVIEW OF SYSTEMS:  4 point ROS including Respiratory, CV, GI and , other than that noted in the HPI,  is negative    Objective:   BP (!) 150/67   Pulse 60   Temp 98.2  F (36.8  C)   Resp 18   Ht 1.499 m (4' 11\")   Wt 62.1 kg (136 lb 12.8 oz)   SpO2 96%   BMI 27.63 kg/m    GENERAL APPEARANCE:  Alert, in no distress  ENT:  Mouth and posterior oropharynx normal, moist mucous membranes, [place]  RESP:  respiratory effort and palpation of chest normal, lungs clear to auscultation , no respiratory distress  CV:  Palpation and auscultation of heart done , regular rate and rhythm, no murmur, rub, or gallop, no edema  ABDOMEN:  normal bowel sounds, soft, nontender, no hepatosplenomegaly or other masses  M/S:   no gross deformities  SKIN:  no rash  NEURO:   alert, NFD  PSYCH:  affect and mood normal    Recent Results (from the past 240 hour(s))   CBC with platelets    Collection Time: 04/11/22  7:28 AM   Result Value Ref Range    WBC Count 4.7 4.0 - 11.0 10e3/uL    RBC Count 2.99 (L) 3.80 - 5.20 10e6/uL    Hemoglobin 9.7 (L) 11.7 - 15.7 g/dL    Hematocrit 28.5 (L) " 35.0 - 47.0 %    MCV 95 78 - 100 fL    MCH 32.4 26.5 - 33.0 pg    MCHC 34.0 31.5 - 36.5 g/dL    RDW 13.8 10.0 - 15.0 %    Platelet Count 242 150 - 450 10e3/uL   Basic metabolic panel    Collection Time: 04/11/22  7:28 AM   Result Value Ref Range    Sodium 134 133 - 144 mmol/L    Potassium 3.9 3.4 - 5.3 mmol/L    Chloride 103 94 - 109 mmol/L    Carbon Dioxide (CO2) 26 20 - 32 mmol/L    Anion Gap 5 3 - 14 mmol/L    Urea Nitrogen 28 7 - 30 mg/dL    Creatinine 1.16 (H) 0.52 - 1.04 mg/dL    Calcium 8.7 8.5 - 10.1 mg/dL    Glucose 87 70 - 99 mg/dL    GFR Estimate 44 (L) >60 mL/min/1.73m2   UA reflex to Microscopic and Culture    Collection Time: 04/11/22  2:15 PM    Specimen: Urinary Bladder; Urine   Result Value Ref Range    Color Urine Yellow Colorless, Straw, Light Yellow, Yellow    Appearance Urine Slightly Cloudy (A) Clear    Glucose Urine Negative Negative mg/dL    Bilirubin Urine Negative Negative    Ketones Urine Negative Negative mg/dL    Specific Gravity Urine 1.011 1.003 - 1.035    Blood Urine Negative Negative    pH Urine 6.0 5.0 - 7.0    Protein Albumin Urine Negative Negative mg/dL    Urobilinogen Urine Normal Normal, 2.0 mg/dL    Nitrite Urine Negative Negative    Leukocyte Esterase Urine Small (A) Negative    Bacteria Urine Few (A) None Seen /HPF    RBC Urine 0 <=2 /HPF    WBC Urine 5 <=5 /HPF    Squamous Epithelials Urine <1 <=1 /HPF       Assessment/Plan:    (W19.XXXS) Fall, sequela  (primary encounter diagnosis)  (R29.6) Recurrent falls  Plan:   -No reported falls since admission to TCU.  Continue with therapies.  Continue fall prevention strategies    (I10) Essential hypertension  Plan:   -Systolic blood pressure elevated 112- 180 currently on clonidine, hydralazine, metoprolol succinate.  Check blood pressure 1 hour after all blood pressure medications have been given x1 week    (R41.89) Cognitive decline  Plan:   -She is alert and orientated.  Slums score 16 out of 30      Electronically signed by:  Sherwin Veloz, CNP              Sincerely,        Sherwin Veloz, CNP

## 2022-04-27 NOTE — PROGRESS NOTES
"[facility] GERIATRICS    Chief Complaint   Patient presents with     RECHECK     HPI:  Nina Villalpando is a 89 year old  (9/21/1930), who [facility] today [facility] [[id number]].     Brief hospitalization summary  Admitted to the hospital after suffering a ground-level fall.  Imaging of the head, cervical spine, and bilateral knees were negative.  Urine culture without growth.     4/11 doing well, started therapies  4/19 working with therapies, UM 16/20, SBP elevated 112-180  4/27 blood pressure remaineds elevated 140s to 200    Nina's blood pressures have remained elevated 140s to 200.  Last evening, her systolic blood pressure was 210.  Today, she reported no visual changes, dyspnea, or chest pain.    Allergies, and PMH/PSH reviewed in EPIC today.  REVIEW OF SYSTEMS:  4 point ROS including Respiratory, CV, GI and , other than that noted in the HPI,  is negative    Objective:   /70   Pulse 50   Temp 97.3  F (36.3  C)   Resp 20   Ht 1.499 m (4' 11\")   Wt 61.6 kg (135 lb 12.8 oz)   SpO2 97%   BMI 27.43 kg/m    GENERAL APPEARANCE:  Alert, in no distress  EYES:  EOM normal, conjunctiva and lids normal  NECK:  no adenopathy  RESP:  respiratory effort and palpation of chest normal, lungs clear to auscultation , no respiratory distress  CV:  Palpation and auscultation of heart done , regular rate and rhythm, no murmur, rub, or gallop, no edema  ABDOMEN:  normal bowel sounds, soft, nontender, no hepatosplenomegaly or other masses  M/S:   no gross deformities  SKIN:  no rash or bruising  NEURO:   alert, clear speech, NFD  PSYCH:  affect and mood normal      Most Recent 3 CBC's:Recent Labs   Lab Test 04/11/22  0728 04/07/22  0838 01/06/20  1216   WBC 4.7 5.9 6.5   HGB 9.7* 9.9* 12.1   MCV 95 95 94    248 323     Most Recent 3 BMP's:Recent Labs   Lab Test 04/11/22  0728 04/07/22  0838 06/09/20  0900    133 135 "   POTASSIUM 3.9 3.8 4.3   CHLORIDE 103 99 103   CO2 26 27 25   BUN 28 26 25   CR 1.16* 1.25* 1.10*   ANIONGAP 5 7 7   MOISES 8.7 8.9 9.0   GLC 87 89 108*       Assessment/Plan:    (I10) Essential hypertension  (primary encounter diagnosis)  Plan:   -Established, not well controlled  -Increase hydralazine to 100 mg twice daily  -Continue clonidine and metoprolol succinate  -Check blood pressure 1 hour after all blood pressure medications have been given x1 week      Electronically signed by: Sherwin Veloz, CNP

## 2022-04-27 NOTE — LETTER
"    4/27/2022        RE: Nina Villalpando  [address] [facility] [address]        [facility]    Chief Complaint   Patient presents with     RECHECK     HPI:  Nina Villalpando is a 89 year old  (9/21/1930), who is being [facility] today [facility] [[id number]].     Brief hospitalization summary  Admitted to the hospital after suffering a ground-level fall.  Imaging of the head, cervical spine, and bilateral knees were negative.  Urine culture without growth.     4/11 doing well, started therapies  4/19 working with therapies, SLUM 16/20, SBP elevated 112-180  4/27 blood pressure remaineds elevated 140s to 200    Nina's blood pressures have remained elevated 140s to 200.  Last evening, her systolic blood pressure was 210.  Today, she reported no visual changes, dyspnea, or chest pain.    Allergies, and PMH/PSH reviewed in EPIC today.  REVIEW OF SYSTEMS:  4 point ROS including Respiratory, CV, GI and , other than that noted in the HPI,  is negative    Objective:   /70   Pulse 50   Temp 97.3  F (36.3  C)   Resp 20   Ht 1.499 m (4' 11\")   Wt 61.6 kg (135 lb 12.8 oz)   SpO2 97%   BMI 27.43 kg/m    GENERAL APPEARANCE:  Alert, in no distress  EYES:  EOM normal, conjunctiva and lids normal  NECK:  no adenopathy  RESP:  respiratory effort and palpation of chest normal, lungs clear to auscultation , no respiratory distress  CV:  Palpation and auscultation of heart done , regular rate and rhythm, no murmur, rub, or gallop, no edema  ABDOMEN:  normal bowel sounds, soft, nontender, no hepatosplenomegaly or other masses  M/S:   no gross deformities  SKIN:  no rash or bruising  NEURO:   alert, clear speech, NFD  PSYCH:  affect and mood normal      Most Recent 3 CBC's:Recent Labs   Lab Test 04/11/22  0728 04/07/22  0838 01/06/20  1216   WBC 4.7 5.9 6.5   HGB 9.7* 9.9* 12.1   MCV 95 95 94    248 323     Most Recent 3 " BMP's:Recent Labs   Lab Test 04/11/22  0728 04/07/22  0838 06/09/20  0900    133 135   POTASSIUM 3.9 3.8 4.3   CHLORIDE 103 99 103   CO2 26 27 25   BUN 28 26 25   CR 1.16* 1.25* 1.10*   ANIONGAP 5 7 7   MOISES 8.7 8.9 9.0   GLC 87 89 108*       Assessment/Plan:    (I10) Essential hypertension  (primary encounter diagnosis)  Plan:   -Established, not well controlled  -Increase hydralazine to 100 mg twice daily  -Continue clonidine and metoprolol succinate  -Check blood pressure 1 hour after all blood pressure medications have been given x1 week      Electronically signed by: Sherwin Veloz, DANIELLE              Sincerely,        Sherwin Veloz, CNP

## 2022-05-05 NOTE — PROGRESS NOTES
"[place] [facility]  PRIMARY CARE PROVIDER AND CLINIC:  Kyra Dangelo MD, [address] [facility] [address] / [facility] [id number]  Chief Complaint   Patient presents with     Hospital F/U     [place] Medical Record Number:  [id number]  Place of Service where encounter took place:  [facility] ([facility]) [[id number]]    Nina Villalpando  is a 89 year old  (9/21/1930), admitted to the above facility from  [facility]. Hospital stay 4/7/22 through 4/8/22..  Admitted to this facility for  rehab, medical management and nursing care.    HPI:    HPI information obtained from: facility chart records, facility staff, patient report and [facility] chart review.   Brief Summary of Hospital Course:   Pt with PMH notable for arthritis, dementia, had  A fall felt to be due to physical debility, imaging negative. Transitioned to U for rehab        Today:  -  - Resident seen and examined, chart reviewed and discussed with the nursing staff.   - Rehab:  pt  reports it is going pretty good, 'I do what they ask me to do\". PT reports walks with A1 using 2FWW.  Pt endorses getting stronger.   - Sinus: reports sinuses drainage once in a while.  Repots had postnasal drip and congested nose off and on. Denies facial pressure, fever or chills. Denies cough or wheezing. denies seasonal allergies.     - HTN: denies headache, chest pain    - Dementia: nurse parvin reports pt is adjusting well. No behavioral concern.     - DNP and RN have no concern today.    CODE STATUS/ADVANCE DIRECTIVES DISCUSSION:   DNR / DNI  Patient's living condition: lives alone  ALLERGIES: No known drug allergies  PAST MEDICAL HISTORY:  has a past medical history of Carpal tunnel syndrome, Diverticulitis of colon (without mention of hemorrhage)(562.11), Generalized osteoarthrosis, unspecified site, Hernia of unspecified site of abdominal cavity without mention of obstruction or gangrene, Other and unspecified " hyperlipidemia, Unspecified essential hypertension, and Unspecified sinusitis (chronic).    She has no past medical history of Asthma, Blood transfusion, Congestive heart failure, unspecified, COPD (chronic obstructive pulmonary disease) (H), Coronary artery disease, Diabetes mellitus (H), Malignant neoplasm (H), Thyroid disease, or Unspecified cerebral artery occlusion with cerebral infarction.  PAST SURGICAL HISTORY:   has a past surgical history that includes NONSPECIFIC PROCEDURE (1980); colonoscopy (04/16/2007); hernia repair, umbilical (09/27/2007); cataract iol, rt/lt (6/26/2008); cataract iol, rt/lt (7/24/2008); and Laser YAG capsulotomy (5/22/2014).  FAMILY HISTORY: family history includes C.A.D. in her father and sister; Diabetes in her daughter, maternal grandmother, and mother; Heart Disease in her brother, brother, brother, and sister; Respiratory in her sister.  SOCIAL HISTORY:   reports that she has never smoked. She has never used smokeless tobacco. She reports current alcohol use. She reports that she does not use drugs.    Post Discharge Medication Reconciliation Status: discharge medications reconciled and changed, per note/orders  Current Outpatient Medications   Medication Sig Dispense Refill     Blood Pressure Monitor KIT 1 Device daily Use to check your blood pressure every day. (Wrist monitor) 1 kit 0     cloNIDine (CATAPRES) 0.2 MG tablet Take 0.2 mg by mouth 2 times daily       COMPRESSION STOCKINGS Lower extremities compression stockings  Below the knees  Mild pressure 15-20 MMHG         2 each 0     hydrALAZINE (APRESOLINE) 100 MG tablet Take 1 tablet (100 mg) by mouth 2 times daily       metoprolol succinate ER (TOPROL-XL) 100 MG 24 hr tablet TAKE 1 & 1/2 (ONE & ONE-HALF) TABLETS BY MOUTH AT BEDTIME 135 tablet 1     MULTI-DAY OR TABS   0     order for DME Equipment being ordered: Blood pressure monitor 1 Device 0     SENNA-docusate sodium (SENNA S) 8.6-50 MG tablet Take 2 tablets by  "mouth as needed in the morning (constipation).       ROS:  10 point ROS of systems including Constitutional, Eyes, Respiratory, Cardiovascular, Gastroenterology, Genitourinary, Integumentary, Musculoskeletal, Psychiatric were all negative except for pertinent positives noted in my HPI.    Vitals:  BP (!) 154/78   Pulse 78   Temp 98  F (36.7  C)   Resp 17   Ht 1.499 m (4' 11\")   Wt 60.2 kg (132 lb 12.8 oz)   SpO2 93%   BMI 26.82 kg/m    Exam:  GENERAL APPEARANCE:  in no distress, cooperative  ENT:  Mouth and posterior oropharynx normal, moist mucous membranes, oral mucosa moist, no lesion noted.   EYES:  EOMI, Pupil rounded and equal.  RESP:  lungs clear to auscultation   CV:  S1S2 audible, regular HR, no murmur appreciated.   ABDOMEN:  soft, NT/ND, BS audible. no mass appreciated on palpation.   M/S:   DIP nodules. Traces peripheral edema  SKIN:  No rash.   NEURO:   No NFD appreciated on observation. Hand  5/5 b/l  PSYCH:  AAOx person, place, does not know the day, knows the month and the year. Mood and affect normal. Short termor memory intact.       Lab/Diagnostic data: Reviewed in the chart and EHR.        ASSESSMENT/PLAN:  * Fall from ground level with facial laceration, felt to be due to physical debility, imaging negative. Transitioned to TCU for rehab  - started rehab program, making a progress, continue until desired goal is achieved.   - analgesia optimal      Cognitive deficit, SLUM 16/30 c/w dementia, of late onset w/o behaviors (H):  - TSH 3.19 (4/7/22). No B12 result.   - Head CT revealed \"Mild to moderate generalized brain parenchymal volume loss. Moderate to severe extent of patchy and confluent nonspecific hypodensity in the cerebral white matter, likely representing advanced chronic small vessel ischemic changes.\"      Chronic Conditions:  ---------------------------    * CKD 3b, GFR 30-44 ml/min (H): - Avoid nephrotoxic  medications. Renally dose medications. Monitor electrolytes, and " dehydration status      * Essential HTN: elevated SBP at TCU, hydralazine increased to 100 mg bid (4/27).     * Chronic sinusitis: seems stable. Continue to monitor.       Electronically signed by:  Randy Ventura MD

## 2022-05-05 NOTE — LETTER
"    5/5/2022        RE: Nina Villalpando  [address]  [facility] [address]        [place] [facility]  PRIMARY CARE PROVIDER AND CLINIC:  Kyra Dangelo MD, [address] [facility] [address] / [facility] [address]  Chief Complaint   Patient presents with     Hospital F/U     [place] Medical Record Number:  [id number]  Place of Service where encounter took place:  [facility] ([facility]) [[id number]]    Nina Villalpando  is a 89 year old  (9/21/1930), admitted to the above facility from  [facility]. Hospital stay 4/7/22 through 4/8/22..  Admitted to this facility for  rehab, medical management and nursing care.    HPI:    HPI information obtained from: facility chart records, facility staff, patient report and [facility] chart review.   Brief Summary of Hospital Course:   Pt with PMH notable for arthritis, dementia, had  A fall felt to be due to physical debility, imaging negative. Transitioned to U for rehab        Today:  -  - Resident seen and examined, chart reviewed and discussed with the nursing staff.   - Rehab:  pt  reports it is going pretty good, 'I do what they ask me to do\". PT reports walks with A1 using 2FWW.  Pt endorses getting stronger.   - Sinus: reports sinuses drainage once in a while.  Repots had postnasal drip and congested nose off and on. Denies facial pressure, fever or chills. Denies cough or wheezing. denies seasonal allergies.     - HTN: denies headache, chest pain    - Dementia: nurse manger reports pt is adjusting well. No behavioral concern.     - DNP and RN have no concern today.    CODE STATUS/ADVANCE DIRECTIVES DISCUSSION:   DNR / DNI  Patient's living condition: lives alone  ALLERGIES: No known drug allergies  PAST MEDICAL HISTORY:  has a past medical history of Carpal tunnel syndrome, Diverticulitis of colon (without mention of hemorrhage)(562.11), Generalized osteoarthrosis, unspecified site, Hernia of unspecified " site of abdominal cavity without mention of obstruction or gangrene, Other and unspecified hyperlipidemia, Unspecified essential hypertension, and Unspecified sinusitis (chronic).    She has no past medical history of Asthma, Blood transfusion, Congestive heart failure, unspecified, COPD (chronic obstructive pulmonary disease) (H), Coronary artery disease, Diabetes mellitus (H), Malignant neoplasm (H), Thyroid disease, or Unspecified cerebral artery occlusion with cerebral infarction.  PAST SURGICAL HISTORY:   has a past surgical history that includes NONSPECIFIC PROCEDURE (1980); colonoscopy (04/16/2007); hernia repair, umbilical (09/27/2007); cataract iol, rt/lt (6/26/2008); cataract iol, rt/lt (7/24/2008); and Laser YAG capsulotomy (5/22/2014).  FAMILY HISTORY: family history includes C.A.D. in her father and sister; Diabetes in her daughter, maternal grandmother, and mother; Heart Disease in her brother, brother, brother, and sister; Respiratory in her sister.  SOCIAL HISTORY:   reports that she has never smoked. She has never used smokeless tobacco. She reports current alcohol use. She reports that she does not use drugs.    Post Discharge Medication Reconciliation Status: discharge medications reconciled and changed, per note/orders  Current Outpatient Medications   Medication Sig Dispense Refill     Blood Pressure Monitor KIT 1 Device daily Use to check your blood pressure every day. (Wrist monitor) 1 kit 0     cloNIDine (CATAPRES) 0.2 MG tablet Take 0.2 mg by mouth 2 times daily       COMPRESSION STOCKINGS Lower extremities compression stockings  Below the knees  Mild pressure 15-20 MMHG         2 each 0     hydrALAZINE (APRESOLINE) 100 MG tablet Take 1 tablet (100 mg) by mouth 2 times daily       metoprolol succinate ER (TOPROL-XL) 100 MG 24 hr tablet TAKE 1 & 1/2 (ONE & ONE-HALF) TABLETS BY MOUTH AT BEDTIME 135 tablet 1     MULTI-DAY OR TABS   0     order for DME Equipment being ordered: Blood pressure  "monitor 1 Device 0     SENNA-docusate sodium (SENNA S) 8.6-50 MG tablet Take 2 tablets by mouth as needed in the morning (constipation).       ROS:  10 point ROS of systems including Constitutional, Eyes, Respiratory, Cardiovascular, Gastroenterology, Genitourinary, Integumentary, Musculoskeletal, Psychiatric were all negative except for pertinent positives noted in my HPI.    Vitals:  BP (!) 154/78   Pulse 78   Temp 98  F (36.7  C)   Resp 17   Ht 1.499 m (4' 11\")   Wt 60.2 kg (132 lb 12.8 oz)   SpO2 93%   BMI 26.82 kg/m    Exam:  GENERAL APPEARANCE:  in no distress, cooperative  ENT:  Mouth and posterior oropharynx normal, moist mucous membranes, oral mucosa moist, no lesion noted.   EYES:  EOMI, Pupil rounded and equal.  RESP:  lungs clear to auscultation   CV:  S1S2 audible, regular HR, no murmur appreciated.   ABDOMEN:  soft, NT/ND, BS audible. no mass appreciated on palpation.   M/S:   DIP nodules. Traces peripheral edema  SKIN:  No rash.   NEURO:   No NFD appreciated on observation. Hand  5/5 b/l  PSYCH:  AAOx person, place, does not know the day, knows the month and the year. Mood and affect normal. Short termor memory intact.       Lab/Diagnostic data: Reviewed in the chart and EHR.        ASSESSMENT/PLAN:  * Fall from ground level with facial laceration, felt to be due to physical debility, imaging negative. Transitioned to TCU for rehab  - started rehab program, making a progress, continue until desired goal is achieved.   - analgesia optimal      Cognitive deficit, SLUM 16/30 c/w dementia, of late onset w/o behaviors (H):  - TSH 3.19 (4/7/22). No B12 result.   - Head CT revealed \"Mild to moderate generalized brain parenchymal volume loss. Moderate to severe extent of patchy and confluent nonspecific hypodensity in the cerebral white matter, likely representing advanced chronic small vessel ischemic changes.\"      Chronic Conditions:  ---------------------------    * CKD 3b, GFR 30-44 ml/min " (H): - Avoid nephrotoxic  medications. Renally dose medications. Monitor electrolytes, and dehydration status      * Essential HTN: elevated SBP at TCU, hydralazine increased to 100 mg bid (4/27).     * Chronic sinusitis: seems stable. Continue to monitor.       Electronically signed by:  Randy Ventura MD                               Sincerely,        Randy Ventura MD

## 2022-05-10 NOTE — PROGRESS NOTES
"[facility] GERIATRICS    Chief Complaint   Patient presents with     RECHECK     HPI:  Nina Villalpando is a 89 year old  (9/21/1930), who is being seen today for an episodic care visit at: [facility] [[id number]].       Brief hospitalization summary  Admitted to the hospital after suffering a ground-level fall.  Imaging of the head, cervical spine, and bilateral knees were negative.  Urine culture without growth.     4/11 doing well, started therapies  4/19 working with therapies, SLUM 16/20, SBP elevated 112-180  4/27 blood pressure remaineds elevated 140s to 200   5/5 MD visit  5/11 working with therapies    Nursing reported no acute concerns.  Systolic blood pressure range 127-165.  She denied vision changes, headaches, CP, and syncope      Allergies, and PMH/PSH reviewed in [facility] today.  REVIEW OF SYSTEMS:  4 point ROS including Respiratory, CV, GI and , other than that noted in the HPI,  is negative    Objective:   BP (!) 162/77   Pulse 65   Temp 98.2  F (36.8  C)   Resp 19   Ht 1.499 m (4' 11\")   Wt 58.2 kg (128 lb 6.4 oz)   SpO2 93%   BMI 25.93 kg/m    GENERAL APPEARANCE:  Alert, in no distress  EYES:  EOM normal, conjunctiva and lids normal  RESP:  lungs clear to auscultation , no respiratory distress  CV:  Palpation and auscultation of heart done , regular rate and rhythm, no murmur, rub, or gallop  M/S:   no gross deformities  SKIN:  no rash  NEURO:   alert, clear speech, NFD  PSYCH:  memory impaired , affect and mood normal      Most Recent 3 CBC's:Recent Labs   Lab Test 04/11/22  0728 04/07/22  0838 01/06/20  1216   WBC 4.7 5.9 6.5   HGB 9.7* 9.9* 12.1   MCV 95 95 94    248 323     Most Recent 3 BMP's:Recent Labs   Lab Test 04/11/22  0728 04/07/22  0838 06/09/20  0900    133 135   POTASSIUM 3.9 3.8 4.3   CHLORIDE 103 99 103   CO2 26 27 25   BUN 28 26 25   CR 1.16* 1.25* 1.10*   ANIONGAP 5 7 7   MOISES 8.7 8.9 9.0   GLC 87 89 108* "       Assessment/Plan:    (R29.6) Recurrent falls  (primary encounter diagnosis)  (W19.XXXS) Fall, sequela  Plan:   -No falls since TCU admission.  Continue working with therapies    (I10) Essential hypertension  Plan:   -Blood pressure labile -165.  Blood pressure improved since hydralazine was increased 2 weeks ago.  No changes today given history of recurrent falls and she is asymptomatic i.e. no headaches, vision changes, or chest pain.  Monitor blood pressures closely during TCU stay      Electronically signed by: Sherwin Veloz, DANIELLE

## 2022-05-11 NOTE — LETTER
"    5/11/2022        RE: Nina Villalpando  [address] [address]        [facility]    Chief Complaint   Patient presents with     RECHECK     HPI:  Nina Villalpando is a 89 year old  (9/21/1930), who is being [facility] today [facility] an [facility] ([facility] [[id number]].       Brief hospitalization summary  Admitted to the hospital after suffering a ground-level fall.  Imaging of the head, cervical spine, and bilateral knees were negative.  Urine culture without growth.     4/11 doing well, started therapies  4/19 working with therapies, SLUM 16/20, SBP elevated 112-180  4/27 blood pressure remaineds elevated 140s to 200   5/5 MD visit  5/11 working with therapies    Nursing reported no acute concerns.  Systolic blood pressure range 127-165.  She denied vision changes, headaches, CP, and syncope      Allergies, and PMH/PSH reviewed in EPIC today.  REVIEW OF SYSTEMS:  4 point ROS including Respiratory, CV, GI and , other than that noted in the HPI,  is negative    Objective:   BP (!) 162/77   Pulse 65   Temp 98.2  F (36.8  C)   Resp 19   Ht 1.499 m (4' 11\")   Wt 58.2 kg (128 lb 6.4 oz)   SpO2 93%   BMI 25.93 kg/m    GENERAL APPEARANCE:  Alert, in no distress  EYES:  EOM normal, conjunctiva and lids normal  RESP:  lungs clear to auscultation , no respiratory distress  CV:  Palpation and auscultation of heart done , regular rate and rhythm, no murmur, rub, or gallop  M/S:   no gross deformities  SKIN:  no rash  NEURO:   alert, clear speech, NFD  PSYCH:  memory impaired , affect and mood normal      Most Recent 3 CBC's:Recent Labs   Lab Test 04/11/22  0728 04/07/22  0838 01/06/20  1216   WBC 4.7 5.9 6.5   HGB 9.7* 9.9* 12.1   MCV 95 95 94    248 323     Most Recent 3 BMP's:Recent Labs   Lab Test 04/11/22  0728 04/07/22  0838 06/09/20  0900    133 135   POTASSIUM 3.9 3.8 4.3   CHLORIDE 103 99 103   CO2 26 27 25   BUN 28 26 " 25   CR 1.16* 1.25* 1.10*   ANIONGAP 5 7 7   MOISES 8.7 8.9 9.0   GLC 87 89 108*       Assessment/Plan:    (R29.6) Recurrent falls  (primary encounter diagnosis)  (W19.XXXS) Fall, sequela  Plan:   -No falls since TCU admission.  Continue working with therapies    (I10) Essential hypertension  Plan:   -Blood pressure labile -165.  Blood pressure improved since hydralazine was increased 2 weeks ago.  No changes today given history of recurrent falls and she is asymptomatic i.e. no headaches, vision changes, or chest pain.  Monitor blood pressures closely during TCU stay      Electronically signed by: Sherwin Veloz CNP              Sincerely,        Sherwin Veloz, CNP

## 2022-05-19 NOTE — PROGRESS NOTES
[facility]      OUTPATIENT OCCUPATIONAL THERAPY  EVALUATION  PLAN OF TREATMENT FOR OUTPATIENT REHABILITATION  (COMPLETE FOR INITIAL CLAIMS ONLY)  Patient's Last Name, First Name, M.I.  YOB: 1930  Nina Villalpando                          Provider's Name  [facility] Medical Record No.  [id number]                               Onset Date:  04/08/22   Start of Care Date:   4/8/2022     Type:     ___PT   _X_OT   ___SLP Medical Diagnosis:   Frequent falls                        OT Diagnosis:  Impaired ADLs   Visits from SOC:  1   _________________________________________________________________________________  Plan of Treatment/Functional Goals    Planned Interventions: ADL retraining, strengthening, ROM, risk factor education, progressive activity/exercise, home program guidelines   Goals: See Occupational Therapy Goals on Care Plan in [facility] electronic health record.    Therapy Frequency:  5x/week  Predicted Duration of Therapy Intervention:  3-4 days  _________________________________________________________________________________    I CERTIFY THE NEED FOR THESE SERVICES FURNISHED UNDER        THIS PLAN OF TREATMENT AND WHILE UNDER MY CARE     (Physician co-signature of this document indicates review and certification of the therapy plan).                 ,      Referring Physician: Ivana Cramer MD            Initial Assessment        See Occupational Therapy evaluation dated   in Epic electronic health record.

## 2022-05-20 NOTE — PROGRESS NOTES
[facility] GERIATRICS    Chief Complaint   Patient presents with     RECHECK     HPI:  Nina Villalpando is a 89 year old  (9/21/1930), who is being seen today for an episodic care visit at: [facility] [[id number]].     This is a 89 yo female who presented after a mechanical fall, no head strike. Imaging of the head, cervical spine, and bilateral knees were negative.  Urine culture without growth. Probably d/t deconditioning. No other changes, sent to [facility] per rehab.    Today's concern is:   HTN, therapy progress    Allergies, and PMH/PSH reviewed in EPIC today.  REVIEW OF SYSTEMS:  4 point ROS including Respiratory, CV, GI and , other than that noted in the HPI,  is negative    Objective:   /57   Pulse 53   Temp 98.2  F (36.8  C)   Resp 16   Wt 61.2 kg (135 lb)   SpO2 93%   BMI 27.27 kg/m    GENERAL APPEARANCE:  Alert, oriented, cooperative, denies pain  RESP:  respiratory effort and palpation of chest normal, lungs clear to auscultation , no respiratory distress  CV:  Palpation and auscultation of heart done , regular rate and rhythm, 3/6 JERMAINE 2ICS RSB and 2/6 JERMAINE 4ICS LSB, no gallop, no edema  PSYCH:  oriented X 3, memory impaired       Most Recent 3 CBC's:  Recent Labs   Lab Test 04/11/22  0728 04/07/22  0838 01/06/20  1216   WBC 4.7 5.9 6.5   HGB 9.7* 9.9* 12.1   MCV 95 95 94    248 323     Most Recent 3 BMP's:  Recent Labs   Lab Test 04/11/22  0728 04/07/22  0838 06/09/20  0900    133 135   POTASSIUM 3.9 3.8 4.3   CHLORIDE 103 99 103   CO2 26 27 25   BUN 28 26 25   CR 1.16* 1.25* 1.10*   ANIONGAP 5 7 7   MOISES 8.7 8.9 9.0   GLC 87 89 108*       Assessment/Plan:    (R29.6) Multiple falls  Continue therapy    (I10) Essential hypertension  (primary encounter diagnosis)  (I35.0) Aortic valve stenosis, etiology of cardiac valve disease unspecified  Per HR <60 will decrease metoprolol succinate to 100mg daily, clonidine 0.2mg BID (hold for SBP  <160) and hydralazine 100mg BID (hold for SBP <140). Monitor BP/HR at 2200 x 1wk    (N18.32) Stage 3b chronic kidney disease (H)  Last Cr 1.16 with GFR 44 on 4/11    (D64.9) Anemia, unspecified type  Last Hgb 9.7 on 4/11    Orders:  Decrease metoprolol succinate, hold parameters on metoprolol/hydralazine    Electronically signed by: Everardo Pugh NP

## 2022-05-20 NOTE — LETTER
5/20/2022        RE: Nina Villalpando  [address] [address]        M [facility] GERIATRICS    Chief Complaint   Patient presents with     RECHECK     HPI:  Nina Villalpando is a 89 year old  (9/21/1930), who is being seen today for an episodic care visit at: [facility] [[id number]].     This is a 89 yo female who presented after a mechanical fall, no head strike. Imaging of the head, cervical spine, and bilateral knees were negative.  Urine culture without growth. Probably d/t deconditioning. No other changes, sent to [facility] per rehab.    Today's concern is:   HTN, therapy progress    Allergies, and PMH/PSH reviewed in [facility] today.  REVIEW OF SYSTEMS:  4 point ROS including Respiratory, CV, GI and , other than that noted in the HPI,  is negative    Objective:   /57   Pulse 53   Temp 98.2  F (36.8  C)   Resp 16   Wt 61.2 kg (135 lb)   SpO2 93%   BMI 27.27 kg/m    GENERAL APPEARANCE:  Alert, oriented, cooperative, denies pain  RESP:  respiratory effort and palpation of chest normal, lungs clear to auscultation , no respiratory distress  CV:  Palpation and auscultation of heart done , regular rate and rhythm, 3/6 JERMAINE 2ICS RSB and 2/6 JERMAINE 4ICS LSB, no gallop, no edema  PSYCH:  oriented X 3, memory impaired       Most Recent 3 CBC's:  Recent Labs   Lab Test 04/11/22  0728 04/07/22  0838 01/06/20  1216   WBC 4.7 5.9 6.5   HGB 9.7* 9.9* 12.1   MCV 95 95 94    248 323     Most Recent 3 BMP's:  Recent Labs   Lab Test 04/11/22  0728 04/07/22  0838 06/09/20  0900    133 135   POTASSIUM 3.9 3.8 4.3   CHLORIDE 103 99 103   CO2 26 27 25   BUN 28 26 25   CR 1.16* 1.25* 1.10*   ANIONGAP 5 7 7   MOISES 8.7 8.9 9.0   GLC 87 89 108*       Assessment/Plan:    (R29.6) Multiple falls  Continue therapy    (I10) Essential hypertension  (primary encounter diagnosis)  (I35.0) Aortic valve stenosis, etiology of cardiac valve disease  unspecified  Per HR <60 will decrease metoprolol succinate to 100mg daily, clonidine 0.2mg BID (hold for SBP <160) and hydralazine 100mg BID (hold for SBP <140). Monitor BP/HR at 2200 x 1wk    (N18.32) Stage 3b chronic kidney disease (H)  Last Cr 1.16 with GFR 44 on 4/11    (D64.9) Anemia, unspecified type  Last Hgb 9.7 on 4/11    Orders:  Decrease metoprolol succinate, hold parameters on metoprolol/hydralazine    Electronically signed by: Everardo Pugh NP            Sincerely,        Everardo Pugh NP

## 2022-05-24 NOTE — PROGRESS NOTES
"[place] GERIATRIC SERVICES DISCHARGE SUMMARY  PATIENT'S NAME: Nina Villalpando  YOB: 1930  MEDICAL RECORD NUMBER:  [id number]  Place of Service where encounter took place:  [facility] [place] ([facility]) [[id number]]    PRIMARY CARE PROVIDER AND CLINIC RESPONSIBLE AFTER TRANSFER:   Kyra Dangelo MD, [address]  / [facility] [address]    [facility] Provider     Transferring providers: Randy Ventura MD  Recent Hospitalization/ED:  [facility] stay 4/7/22 to 4/8/22.  Date of SNF Admission: April 08, 2022  Date of SNF (anticipated) Discharge: May 26, 2022  Discharged to: previous independent home  Cognitive Scores: BIMS: 10/15  Physical Function: 2FWW   DME: Walker    CODE STATUS/ADVANCE DIRECTIVES DISCUSSION:  DNR / DNI   ALLERGIES: No known drug allergies  =========================================================    DISCHARGE DIAGNOSIS/NURSING FACILITY COURSE:    hospitalized from   4/7/22 through 4/8/22 after a Fall from ground level with facial laceration, felt to be due to physical debility, imaging negative. Transitioned to [facility] for rehab  - started rehab program, made a progress.   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 No B12 result.   - Head CT revealed \"Mild to moderate generalized brain parenchymal volume loss. Moderate to severe extent of patchy and confluent nonspecific hypodensity in the cerebral white matter, likely representing advanced chronic small vessel ischemic changes.\"       * Essential HTN:   * Aortic valve stenosis, etiology of cardiac valve disease unspecified  - 4/27: elevated SBP at TCU, hydralazine increased to 100 mg bid  - 5/20: HR <60, hence metoprolol succinate decreased  to 100mg daily. parameter added as follows: \"clonidine 0.2mg BID (hold for SBP <160) and hydralazine 100mg BID (hold for SBP <140).\"  - 5/24: continued to have " some readings above 150 mmHg.Query secondary HTN. PCP kindly to follow. May benefit from referral to nephrology. Asymptomatic       * CKD 3b, GFR 30-44 ml/min (H): -   - Cr 1.16 with GFR 44 on 4/11  - Avoid nephrotoxic  medications. Renally dose medications. Monitor electrolytes, and dehydration status     Anemia, unspecified type: Last Hgb 9.7 on 4/11        Past Medical History:  has a past medical history of Carpal tunnel syndrome, Diverticulitis of colon (without mention of hemorrhage)(562.11), Generalized osteoarthrosis, unspecified site, Hernia of unspecified site of abdominal cavity without mention of obstruction or gangrene, Other and unspecified hyperlipidemia, Unspecified essential hypertension, and Unspecified sinusitis (chronic).    She has no past medical history of Asthma, Blood transfusion, Congestive heart failure, unspecified, COPD (chronic obstructive pulmonary disease) (H), Coronary artery disease, Diabetes mellitus (H), Malignant neoplasm (H), Thyroid disease, or Unspecified cerebral artery occlusion with cerebral infarction.    Discharge Medications:  Current Outpatient Medications   Medication Sig Dispense Refill     Blood Pressure Monitor KIT 1 Device daily Use to check your blood pressure every day. (Wrist monitor) 1 kit 0     cloNIDine (CATAPRES) 0.2 MG tablet Take 0.2 mg by mouth 2 times daily       COMPRESSION STOCKINGS Lower extremities compression stockings  Below the knees  Mild pressure 15-20 MMHG         2 each 0     hydrALAZINE (APRESOLINE) 100 MG tablet Take 1 tablet (100 mg) by mouth 2 times daily       metoprolol succinate ER (TOPROL-XL) 100 MG 24 hr tablet TAKE 1 & 1/2 (ONE & ONE-HALF) TABLETS BY MOUTH AT BEDTIME (Patient taking differently: 100 mg TAKE 1 & 1/2 (ONE & ONE-HALF) TABLETS BY MOUTH AT BEDTIME) 135 tablet 1     MULTI-DAY OR TABS   0     order for DME Equipment being ordered: Blood pressure monitor 1 Device 0     Medication Changes/Rationale:     - see above       ROS:  "  10 point ROS of systems including Constitutional, Eyes, Respiratory, Cardiovascular, Gastroenterology, Genitourinary, Integumentary, Musculoskeletal, Psychiatric were all negative except for pertinent positives noted in my HPI.    Physical Exam:   Vitals: BP (!) 180/98   Pulse 82   Temp 98.2  F (36.8  C)   Resp 16   Ht 1.499 m (4' 11\")   Wt 61.2 kg (135 lb)   SpO2 93%   BMI 27.27 kg/m    BMI= Body mass index is 27.27 kg/m .   GENERAL APPEARANCE:  in no distress, cooperative  RESP:  lungs clear to auscultation   CV:  S1S2 audible, regular HR, systolic murmur over upper sternal border.   ABDOMEN:  soft, NT/ND, BS audible. no mass appreciated on palpation.   M/S:   DIP nodules. Traces peripheral edema  SKIN:  No rash.   NEURO:   No NFD appreciated on observation. Hand  5/5 b/l  PSYCH:  AAOx person, place, does not know the day, knows the month and the year. Mood and affect normal. Short termor memory intact.          SNF labs: Labs done in SNF are in [place] EPIC. Please refer to them using [employer]/Care Everywhere.    DISCHARGE PLAN:    Follow up labs: routine    Medical Follow Up:     1-2 weeks    MTM referral needed and placed by this provider: No    Current [place] scheduled appointments:       Discharge Services: none    Discharge Instructions Verbalized to Patient at Discharge:    TOTAL DISCHARGE TIME:   Greater than 30 minutes  Electronically signed by:  Randy Ventura MD                 "

## 2022-05-24 NOTE — LETTER
"    5/24/2022        RE: Nina Villalpando  [address]  [facility] [address]        [place] GERIATRIC SERVICES DISCHARGE SUMMARY  PATIENT'S NAME: Nina Villalpando  YOB: 1930  MEDICAL RECORD NUMBER:  [id number]  Place of Service where encounter took place:  [facility] ([facility]) [[id number]]    PRIMARY CARE PROVIDER AND CLINIC RESPONSIBLE AFTER TRANSFER:   Kyra Dangelo MD, [address]  / [facility] [address]    [facility] Provider     Transferring providers: Randy Ventura MD  Recent Hospitalization/ED:  [facility] stay 4/7/22 to 4/8/22.  Date of SNF Admission: April 08, 2022  Date of SNF (anticipated) Discharge: May 26, 2022  Discharged to: previous independent home  Cognitive Scores: BIMS: 10/15  Physical Function: 2FWW   DME: Walker    CODE STATUS/ADVANCE DIRECTIVES DISCUSSION:  DNR / DNI   ALLERGIES: No known drug allergies  =========================================================    DISCHARGE DIAGNOSIS/NURSING FACILITY COURSE:    hospitalized from   4/7/22 through 4/8/22 after a Fall from ground level with facial laceration, felt to be due to physical debility, imaging negative. Transitioned to [facility] for rehab  - started rehab program, made a progress.   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 No B12 result.   - Head CT revealed \"Mild to moderate generalized brain parenchymal volume loss. Moderate to severe extent of patchy and confluent nonspecific hypodensity in the cerebral white matter, likely representing advanced chronic small vessel ischemic changes.\"       * Essential HTN:   * Aortic valve stenosis, etiology of cardiac valve disease unspecified  - 4/27: elevated SBP at TCU, hydralazine increased to 100 mg bid  - 5/20: HR <60, hence metoprolol succinate decreased  to 100mg daily. parameter added as follows: \"clonidine 0.2mg BID " "(hold for SBP <160) and hydralazine 100mg BID (hold for SBP <140).\"  - 5/24: continued to have some readings above 150 mmHg.Query secondary HTN. PCP kindly to follow. May benefit from referral to nephrology. Asymptomatic       * CKD 3b, GFR 30-44 ml/min (H): -   - Cr 1.16 with GFR 44 on 4/11  - Avoid nephrotoxic  medications. Renally dose medications. Monitor electrolytes, and dehydration status     Anemia, unspecified type: Last Hgb 9.7 on 4/11        Past Medical History:  has a past medical history of Carpal tunnel syndrome, Diverticulitis of colon (without mention of hemorrhage)(562.11), Generalized osteoarthrosis, unspecified site, Hernia of unspecified site of abdominal cavity without mention of obstruction or gangrene, Other and unspecified hyperlipidemia, Unspecified essential hypertension, and Unspecified sinusitis (chronic).    She has no past medical history of Asthma, Blood transfusion, Congestive heart failure, unspecified, COPD (chronic obstructive pulmonary disease) (H), Coronary artery disease, Diabetes mellitus (H), Malignant neoplasm (H), Thyroid disease, or Unspecified cerebral artery occlusion with cerebral infarction.    Discharge Medications:  Current Outpatient Medications   Medication Sig Dispense Refill     Blood Pressure Monitor KIT 1 Device daily Use to check your blood pressure every day. (Wrist monitor) 1 kit 0     cloNIDine (CATAPRES) 0.2 MG tablet Take 0.2 mg by mouth 2 times daily       COMPRESSION STOCKINGS Lower extremities compression stockings  Below the knees  Mild pressure 15-20 MMHG         2 each 0     hydrALAZINE (APRESOLINE) 100 MG tablet Take 1 tablet (100 mg) by mouth 2 times daily       metoprolol succinate ER (TOPROL-XL) 100 MG 24 hr tablet TAKE 1 & 1/2 (ONE & ONE-HALF) TABLETS BY MOUTH AT BEDTIME (Patient taking differently: 100 mg TAKE 1 & 1/2 (ONE & ONE-HALF) TABLETS BY MOUTH AT BEDTIME) 135 tablet 1     MULTI-DAY OR TABS   0     order for DME Equipment being ordered: " "Blood pressure monitor 1 Device 0     Medication Changes/Rationale:     - see above       ROS:   10 point ROS of systems including Constitutional, Eyes, Respiratory, Cardiovascular, Gastroenterology, Genitourinary, Integumentary, Musculoskeletal, Psychiatric were all negative except for pertinent positives noted in my HPI.    Physical Exam:   Vitals: BP (!) 180/98   Pulse 82   Temp 98.2  F (36.8  C)   Resp 16   Ht 1.499 m (4' 11\")   Wt 61.2 kg (135 lb)   SpO2 93%   BMI 27.27 kg/m    BMI= Body mass index is 27.27 kg/m .   GENERAL APPEARANCE:  in no distress, cooperative  RESP:  lungs clear to auscultation   CV:  S1S2 audible, regular HR, systolic murmur over upper sternal border.   ABDOMEN:  soft, NT/ND, BS audible. no mass appreciated on palpation.   M/S:   DIP nodules. Traces peripheral edema  SKIN:  No rash.   NEURO:   No NFD appreciated on observation. Hand  5/5 b/l  PSYCH:  AAOx person, place, does not know the day, knows the month and the year. Mood and affect normal. Short termor memory intact.          SNF labs: Labs done in SNF are in [place] EPIC. Please refer to them using EPIC/Care Everywhere.    DISCHARGE PLAN:    Follow up labs: routine    Medical Follow Up:     1-2 weeks    MTM referral needed and placed by this provider: No    Current [place] scheduled appointments:       Discharge Services: none    Discharge Instructions Verbalized to Patient at Discharge:    TOTAL DISCHARGE TIME:   Greater than 30 minutes  Electronically signed by:  Randy Ventura MD                       Sincerely,        Randy Ventura MD    "

## 2022-06-01 NOTE — TELEPHONE ENCOUNTER
SHELDON: Patient Update    [facility] to report patient's BP was 180/100. She mixed up her medications this morning and took her Metoprolol dose instead of her Hydralazine. RN had her take her Hydralazine as prescribed and set her medications up in her medi-planner so patient will self-administer them properly. RN to recheck BP prior to leaving. Patient does have a follow up appointment this week.    Please make any recommendations if needed.    Any changes to orders can be given to JOANNA Puckett [phone]

## 2022-06-02 NOTE — TELEPHONE ENCOUNTER
Reviewed. No new recommendations. Report any unimproved blood pressures after switching meds back to normal

## 2022-06-03 PROBLEM — F03.A0 MILD DEMENTIA (H): Status: ACTIVE | Noted: 2022-01-01

## 2022-06-03 NOTE — TELEPHONE ENCOUNTER
Reason for Disposition    Systolic BP >= 180 OR Diastolic >= 110    Protocols used: HIGH BLOOD PRESSURE-A-OH

## 2022-06-03 NOTE — TELEPHONE ENCOUNTER
Contacted home care nurse and updated her that PCP will see in clinic. Called and left message for daughter, Shelby, that PCP will see in clinic-left appointment information.     Sangita Xiong, NESTORN, RN, PHN  Registered Nurse-Clinic Triage  [facility]  6/3/2022 at 12:25 PM

## 2022-06-03 NOTE — TELEPHONE ENCOUNTER
"Lyn[facility] - home care -[phone]  Call both home care RN and daughter with PCP's recommendations.       Nurse Triage SBAR    Is this a 2nd Level Triage? YES, LICENSED PRACTITIONER REVIEW IS REQUIRED    Situation: Call from home care nurse. Reports she is out to visit patient today who had elevated blood pressure and also experienced a fall some point yesterday evening.     Background: Initial /104. RN had patient take all of her medications and BP now 180/90. Patient denies any symptoms associated with hypertension. Patient not always compliant in taking correct dose of medications.     Patient fell some time between 3-6 pm yesterday evening. Home care nurse states right shoulder looks abnormal (square shaped). Patient does have some limited range of motion, describes a \"pulling\" sensation with movement and rates pain 5/10 with movement. Home care nurse reports left shoulder is swollen but not abnormal shaped.     Assessment: Routing to provider to determine best location for patient to be seen per protocol.     Protocol Recommended Disposition:   Discuss With PCP And Call Back By Nurse Within 1 Hour, Go To ED/UCC Now (Or To Office With PCP Approval)    Recommendation:      Routed to provider    Does the patient meet one of the following criteria for ADS visit consideration? 16+ years old, with an [facility] PCP     TIP  Providers, please consider if this condition is appropriate for management at one of our Acute and Diagnostic Services sites.     If patient is a good candidate, please use dotphrase <dot>triageresponse and select Refer to ADS to document.    Kasia BAEZN, RN     Reason for Disposition    Sounds like a serious injury to the triager    Systolic BP >= 180 OR Diastolic >= 110, and missed most recent dose of blood pressure medication    Additional Information    Negative: Major bleeding (actively dripping or spurting) that can't be stopped    Negative: Serious injury with multiple fractures " (broken bones)    Negative: Sounds like a life-threatening emergency to the triager    Negative: Bullet wound, stabbed by knife, or other serious penetrating wound    Negative: Looks like a broken bone or dislocated joint (crooked or deformed)    Negative: Can't move injured arm at all    Negative: Bleeding won't stop after 10 minutes of direct pressure (using correct technique)    Negative: Skin is split open or gaping (or length > 1/2 inch or 12 mm)    Negative: Dirt in the wound and not removed after 15 minutes of scrubbing    Negative: Sounds like a life-threatening emergency to the triager    Negative: Pregnant > 20 weeks or postpartum (< 6 weeks after delivery) and new hand or face swelling    Negative: Pregnant > 20 weeks and BP > 140/90    Negative: Systolic BP >= 160 OR Diastolic >= 100, and any cardiac or neurologic symptoms (e.g., chest pain, difficulty breathing, unsteady gait, blurred vision)    Negative: Patient sounds very sick or weak to the triager    Negative: BP Systolic BP >= 140 OR Diastolic >= 90 and postpartum (from 0 to 6 weeks after delivery)    Protocols used: ARM INJURY-A-OH, HIGH BLOOD PRESSURE-A-OH

## 2022-06-03 NOTE — TELEPHONE ENCOUNTER
Home care nurse called to report high /90 but at 8:45 it was 202/104.     Reports fall yesterday     Right shoulder seems misformed   pain at 5 with movement  Range of movement is limited     Home Care Nurse Regina

## 2022-06-03 NOTE — TELEPHONE ENCOUNTER
PCP had clinic visit open this afternoon. RN scheduled patient in this appointment time. Updated daughter that this appointment has been scheduled but still waiting for PCP to review message and may advise patient be seen in ED.     Will keep clinic visit for now until noted otherwise by PCP.     Kasia BAEZN, RN       Reason for Disposition    Systolic BP >= 180 OR Diastolic >= 110    Protocols used: HIGH BLOOD PRESSURE-A-OH

## 2022-06-03 NOTE — PROGRESS NOTES
"  Assessment & Plan   Patient is a pleasant 89-year-old who is here accompanied by her daughter  with concerns for an injury to her right shoulder following a fall at her home.      Shoulder injury    She was recently admitted to the hospital on 4/7/2022 for a fall and later transferred to transitional care unit for rehab before being discharged home.  Shoulder strength however has quite limited range of motion especially with extension and abduction beyond 30 to 40 degrees.  X-ray showed moderate to severe shoulder arthropathy with subluxation/questionable dislocation anteriorly of the right shoulder.  Given her age and the risk for surgery I suggested physical therapy to optimize functionality with her ADLs.  We will try to add shoulder therapy to her current  home health care.    Hypertension  Home health care has reported significantly elevated blood pressures and the patient 180s over 100s.  Blood pressure in the clinic today is at 143/80.  She has a history of severe aortic stenosis in and hence will not lower blood pressure goal due to concerns for hypotension.  Advised to continue current regimen without any changes.    Mild dementia  - discussed social concerns about safety of her living by herself especially with her history of recurrent falls. Encouraged to consider assisted living facility. Daughter to talk to  for options         BMI:   Estimated body mass index is 26.05 kg/m  as calculated from the following:    Height as of this encounter: 1.499 m (4' 11\").    Weight as of this encounter: 58.5 kg (129 lb).   Weight management plan: Discussed healthy diet and exercise guidelines    See Patient Instructions    Return in about 3 months (around 9/3/2022).    Kyra Dangelo MD  [facility] GALINA Wood is a 89 year old who presents for the following health issues  accompanied by her daughter.    Fall    History of Present Illness       Reason for visit: " " Fell hurt shoulder, high bloodpressure  Symptom onset:  1-3 days ago  Symptom intensity:  Mild  Symptom progression:  Improving  Had these symptoms before:  Yes  Has tried/received treatment for these symptoms:  No    She eats 2-3 servings of fruits and vegetables daily.She consumes 2 sweetened beverage(s) daily.She exercises with enough effort to increase her heart rate 9 or less minutes per day.  She exercises with enough effort to increase her heart rate 3 or less days per week.   She is taking medications regularly.       Hypertension Follow-up      Do you check your blood pressure regularly outside of the clinic? No     Are you following a low salt diet? No    Are your blood pressures ever more than 140 on the top number (systolic) OR more   than 90 on the bottom number (diastolic), for example 140/90? Yes    Concern - Fall  Onset: 6/2/22  Description: fell in kitchen last evening  Intensity: moderate  Progression of Symptoms:  same  Accompanying Signs & Symptoms: wrists hurt a bit too  Previous history of similar problem: once in a great while  Precipitating factors:        Worsened by: not behaving  Not using her walker  Alleviating factors:        Improved by: using walker  Therapies tried and outcome: None      Review of Systems   Constitutional, HEENT, cardiovascular, pulmonary, gi and gu systems are negative, except as otherwise noted.      Objective    BP (!) 143/80   Pulse 76   Temp 97.8  F (36.6  C) (Temporal)   Resp 16   Ht 1.499 m (4' 11\")   Wt 58.5 kg (129 lb)   SpO2 97%   Breastfeeding No   BMI 26.05 kg/m    Body mass index is 26.05 kg/m .  Physical Exam   GENERAL: healthy, alert and no distress  NECK: no adenopathy, no asymmetry, masses, or scars and thyroid normal to palpation  RESP: lungs clear to auscultation - no rales, rhonchi or wheezes  CV: regular rates and rhythm and systolic murmur.   MS: Limited ROM of the right shoulder beyond 40 degrees with abduction and extension    Results " for orders placed or performed in visit on 06/03/22   XR Shoulder Right G/E 3 Views     Status: None    Narrative    XR SHOULDER RIGHT G/E 3 VIEWS   6/3/2022 4:09 PM     HISTORY:  Injury of right shoulder, initial encounter. Pain.    Comparison: None.      Impression    IMPRESSION: Severe arthropathy in both shoulders. On the right, there  is marked flattening of the humeral head and deformity of the glenoid.  These findings appear chronic. The humeral head also appears at least  subluxed and possibly dislocated anteriorly, age indeterminate.  Moderate osteoarthrosis of the AC joint. No evidence of an acute  fracture on these views.    BASHIR RENO MD         SYSTEM ID:  [id number]

## 2022-06-06 NOTE — TELEPHONE ENCOUNTER
Spoke PT Brendon [phone]  States that he is calling RK back.  Ok to leave detailed voicemail.      Kwabena Flores, NESTORN, RN, PHN  [facility] ~ Registered Nurse  Clinic Triage ~ [place] [facility]  June 6, 2022

## 2022-06-06 NOTE — RESULT ENCOUNTER NOTE
I called and discussed results with the patient's daughter.  Will await for the phone number to reach her home health services to include therapy for the right shoulder too.  Daughter will MyChart this number at her convenience.

## 2022-06-08 NOTE — PROGRESS NOTES
Assessment & Plan     Hospital discharge follow-up  Patient recently discharged from nursing home facility what sounds like was related to insurance issues.  Does feel like her strength is improved since she was admitted there but I still worry about her function at home with previous falls as well as mild cognitive deficits.  She does have home therapy and nursing available currently but family is not always that close and will be able to continue being this close to move forward.  Daughter currently working with social work on finding assisted living placement.  They feel like they have resources necessary at this time.  They feel like she can be safe at home currently but will not likely be able to continue current situation with family as well as home care.    Multiple falls  Continues work with physical therapy.    Mild dementia (H)    Essential hypertension  Stable with clonidine and hydralazine as well as metoprolol.  I do wonder if elevated home blood pressure readings have been without taking morning medication.  Possibly worsening when waiting on periods to take her clonidine.  Given her stability on this previously we will leave it unchanged for now.    Aortic valve stenosis, etiology of cardiac valve disease unspecified  Has not been very active lately to see if worsening exertional symptoms.  Now that she is using walker she will pay attention to this now and let me know if she notices worsening symptoms.  Consider follow-up echo in the future.    Stage 3a chronic kidney disease (H)  Recent BMP stable.        Abel Carroll MD  [facility] NOMAN Wood is a 89 year old who presents for the following health issues  accompanied by her daughter.    HPI       Hospital Follow-up Visit:    Hospital/Nursing Home/IP Rehab Facility: [facility] and [facility]   Date of Admission: 4/6/2022   Date of Discharge: Admitted to [place] 4/8/2022 until  5/26/2022  Reason(s) for Admission: Fall       Was your hospitalization related to COVID-19? No   Problems taking medications regularly:  None  Medication changes since discharge: None  Problems adhering to non-medication therapy:  None    Summary of hospitalization:  [facility] discharge summary reviewed  Diagnostic Tests/Treatments reviewed.  Follow up needed: With PCP in 1-2 months  Other Healthcare Providers Involved in Patient s Care:         Homecare, Physical Therapy and OT and [facility]   Update since discharge: fluctuating course.  Additional issues: fell again.        Post Discharge Medication Reconciliation: discharge medications reconciled, continue medications without change.  Plan of care communicated with patient              ER admission and nursing home discharge summary was reviewed.  Patient has since been at home and reports that she has done well but she is already had 1 fall and seen for shoulder injury in that time.  Does have home care and physical therapy following currently.  Daughter will get these notes sent.    Review of Systems   Constitutional, HEENT, cardiovascular, pulmonary, gi and gu systems are negative, except as otherwise noted.      Objective    /64   Pulse 83   Temp 97.2  F (36.2  C)   Resp 8   Wt 57.6 kg (127 lb)   SpO2 96%   BMI 25.65 kg/m    Body mass index is 25.65 kg/m .  Physical Exam   GENERAL: healthy, alert and no distress  EYES: Eyes grossly normal to inspection, PERRL and conjunctivae and sclerae normal  NECK: no adenopathy, no asymmetry, masses, or scars and thyroid normal to palpation  RESP: lungs clear to auscultation - no rales, rhonchi or wheezes  CV: regular rate and rhythm, holosystolic murmur noted,  no peripheral edema and peripheral pulses strong  MS: Shoulders with relatively intact ROM. No pain to palpation in right shoulder at this time. No AC joint or acromial tenderness. Some discomfort with external rotation at the  elbow.   SKIN: no suspicious lesions or rashes  NEURO:  mentation intact and speech normal  PSYCH: mentation appears normal, affect normal/bright

## 2022-07-11 NOTE — ED NOTES
Pts daughter reports that the home care nurse gave pt her morning blood pressure pills after checking her blood pressure and getting a reading around 190/100. After waiting a period of time pts blood pressure was rechecked without improvement so pt was sent to the ED for eval.

## 2022-07-11 NOTE — ED PROVIDER NOTES
History     Chief Complaint   Patient presents with     Hypertension     HPI  Nina Villalpando is a 89 year old female who presents via EMS with concerns of an elevated blood pressure at home.  Apparently the home health nurse went to check and noticed that her blood pressure was high, patient cannot tell me how high it was and they told her that they were worried about stroke or heart attack and so she needs to emergently come to the ER.  Patient states he is not really having any symptoms.  She has fallen recently but this is not a new thing, she is following with her primary care doctor about this.  Denies any current chest pain or headaches.  Denies any extremity numbness or tingling.  She states that she has not taken her blood pressures this morning yet.    Allergies:  Allergies   Allergen Reactions     No Known Drug Allergies        Problem List:    Patient Active Problem List    Diagnosis Date Noted     Mild dementia (H) 06/03/2022     Priority: Medium     Facial laceration, initial encounter 04/07/2022     Priority: Medium     Fall at home, initial encounter 04/07/2022     Priority: Medium     Visual hallucination 04/07/2022     Priority: Medium     Multiple falls 04/07/2022     Priority: Medium     CKD (chronic kidney disease) stage 3, GFR 30-59 ml/min (H) 05/24/2019     Priority: Medium     Hyperlipidemia, unspecified 01/22/2014     Priority: Medium     Diagnosis updated by automated process. Provider to review and confirm.       Aortic stenosis 11/01/2013     Priority: Medium     Health Care Home 12/28/2012     Priority: Medium     Oriana Yi RN-PHN  [facility]   [phone]    DX V65.8 REPLACED WITH 02707 HEALTH CARE HOME (04/08/2013)       Hair loss 09/13/2012     Priority: Medium     Chronic rhinitis 04/17/2012     Priority: Medium     Essential hypertension 01/04/2012     Priority: Medium     Varicose veins of legs 11/29/2011     Priority: Medium     Anemia,  unspecified type 12/08/2010     Priority: Medium     Primary osteoarthritis of both knees      Priority: Medium        Past Medical History:    Past Medical History:   Diagnosis Date     Carpal tunnel syndrome      Diverticulitis of colon (without mention of hemorrhage)(562.11)      Generalized osteoarthrosis, unspecified site      Hernia of unspecified site of abdominal cavity without mention of obstruction or gangrene      Other and unspecified hyperlipidemia      Unspecified essential hypertension      Unspecified sinusitis (chronic)        Past Surgical History:    Past Surgical History:   Procedure Laterality Date     CATARACT IOL, RT/LT  6/26/2008    left eye     CATARACT IOL, RT/LT  7/24/2008    right eye     COLONOSCOPY  04/16/2007     HERNIA REPAIR, UMBILICAL  09/27/2007    Incarcerated.     LASER YAG CAPSULOTOMY  5/22/2014    Procedure: LASER YAG CAPSULOTOMY;  Surgeon: Hebert Ledbetter MD;  Location:  OR     ZC NONSPECIFIC PROCEDURE  1980    Hysterectomy due to menometrorrhagia.  Bladder repair.       Family History:    Family History   Problem Relation Age of Onset     Diabetes Maternal Grandmother      C.A.D. Sister      Heart Disease Brother      Diabetes Mother      C.A.D. Father      Heart Disease Brother      Heart Disease Brother      Heart Disease Sister      Respiratory Sister      Diabetes Daughter      Anesthesia Reaction No family hx of      Asthma No family hx of      Hypertension No family hx of      Breast Cancer No family hx of      Alcohol/Drug No family hx of      Alzheimer Disease No family hx of      Prostate Cancer No family hx of      Cancer - colorectal No family hx of      Cerebrovascular Disease No family hx of      Allergies No family hx of      Arthritis No family hx of      Cardiovascular No family hx of      Circulatory No family hx of      Depression No family hx of      Endocrine Disease No family hx of      Genetic Disorder No family hx of      Gastrointestinal  Disease No family hx of      Genitourinary Problems No family hx of      Blood Disease No family hx of      Cancer No family hx of      Congenital Anomalies No family hx of      Connective Tissue Disorder No family hx of      Eye Disorder No family hx of      Gynecology No family hx of      Obesity No family hx of      Osteoporosis No family hx of      Psychotic Disorder No family hx of      Musculoskeletal Disorder No family hx of      Lipids No family hx of      Neurologic Disorder No family hx of      Thyroid Disease No family hx of      Hearing Loss No family hx of        Social History:  Marital Status:   [5]  Social History     Tobacco Use     Smoking status: Never Smoker     Smokeless tobacco: Never Used     Tobacco comment: no smokers in household   Vaping Use     Vaping Use: Never used   Substance Use Topics     Alcohol use: Yes     Comment: marcelo/waterx1-2/x1-2 q couple of months     Drug use: No        Medications:    cloNIDine (CATAPRES) 0.2 MG tablet  hydrALAZINE (APRESOLINE) 100 MG tablet  metoprolol succinate ER (TOPROL XL) 100 MG 24 hr tablet  MULTI-DAY OR TABS  order for DME          Review of Systems   All other systems reviewed and are negative.      Physical Exam   BP: (!) 170/95  Pulse: 72  Temp: 97.7  F (36.5  C)  Resp: 16  Weight: 60.9 kg (134 lb 4.8 oz)  SpO2: 96 %      Physical Exam  Vitals and nursing note reviewed.   Constitutional:       General: She is not in acute distress.     Appearance: Normal appearance. She is well-developed. She is not ill-appearing or diaphoretic.   HENT:      Head: Normocephalic and atraumatic.      Nose: Nose normal.      Mouth/Throat:      Pharynx: No oropharyngeal exudate.   Eyes:      Conjunctiva/sclera: Conjunctivae normal.   Cardiovascular:      Rate and Rhythm: Normal rate and regular rhythm.      Heart sounds: Normal heart sounds. No murmur heard.    No friction rub.   Pulmonary:      Effort: Pulmonary effort is normal. No respiratory distress.       Breath sounds: Normal breath sounds. No stridor. No wheezing or rales.   Abdominal:      General: Bowel sounds are normal. There is no distension.      Palpations: Abdomen is soft. There is no mass.      Tenderness: There is no abdominal tenderness. There is no guarding.   Musculoskeletal:         General: No tenderness. Normal range of motion.      Cervical back: Normal range of motion and neck supple.   Skin:     General: Skin is warm and dry.      Capillary Refill: Capillary refill takes less than 2 seconds.      Findings: No erythema.   Neurological:      Mental Status: She is alert and oriented to person, place, and time.   Psychiatric:         Judgment: Judgment normal.         ED Course                 Procedures        Results for orders placed or performed during the hospital encounter of 07/11/22 (from the past 24 hour(s))   CBC with platelets differential    Narrative    The following orders were created for panel order CBC with platelets differential.  Procedure                               Abnormality         Status                     ---------                               -----------         ------                     CBC with platelets and d...[[id number]]  Abnormal            Final result                 Please view results for these tests on the individual orders.   Basic metabolic panel   Result Value Ref Range    Sodium 141 133 - 144 mmol/L    Potassium 4.2 3.4 - 5.3 mmol/L    Chloride 108 94 - 109 mmol/L    Carbon Dioxide (CO2) 29 20 - 32 mmol/L    Anion Gap 4 3 - 14 mmol/L    Urea Nitrogen 31 (H) 7 - 30 mg/dL    Creatinine 1.36 (H) 0.52 - 1.04 mg/dL    Calcium 8.8 8.5 - 10.1 mg/dL    Glucose 92 70 - 99 mg/dL    GFR Estimate 37 (L) >60 mL/min/1.73m2   CBC with platelets and differential   Result Value Ref Range    WBC Count 5.1 4.0 - 11.0 10e3/uL    RBC Count 3.40 (L) 3.80 - 5.20 10e6/uL    Hemoglobin 10.9 (L) 11.7 - 15.7 g/dL    Hematocrit 32.2 (L) 35.0 - 47.0 %    MCV 95 78 - 100 fL     MCH 32.1 26.5 - 33.0 pg    MCHC 33.9 31.5 - 36.5 g/dL    RDW 14.6 10.0 - 15.0 %    Platelet Count 232 150 - 450 10e3/uL    % Neutrophils 71 %    % Lymphocytes 17 %    % Monocytes 10 %    % Eosinophils 1 %    % Basophils 1 %    % Immature Granulocytes 0 %    NRBCs per 100 WBC 0 <1 /100    Absolute Neutrophils 3.6 1.6 - 8.3 10e3/uL    Absolute Lymphocytes 0.9 0.8 - 5.3 10e3/uL    Absolute Monocytes 0.5 0.0 - 1.3 10e3/uL    Absolute Eosinophils 0.1 0.0 - 0.7 10e3/uL    Absolute Basophils 0.1 0.0 - 0.2 10e3/uL    Absolute Immature Granulocytes 0.0 <=0.4 10e3/uL    Absolute NRBCs 0.0 10e3/uL       Medications   cloNIDine (CATAPRES) tablet 0.2 mg (has no administration in time range)   hydrALAZINE (APRESOLINE) tablet 100 mg (has no administration in time range)   metoprolol succinate ER (TOPROL XL) 24 hr tablet 100 mg (has no administration in time range)     Family had arrived and apparently patient was given her morning meds before she came here which would explain why her blood pressure has been a lot better since she has been here.  Looking in the chart, patient has had some elevated blood pressures in the afternoon.  Her primary care doctor does know about this and they are just watching this.  May need to make some adjustments to the timing of her medication doses.  I would recommend if patient does have an elevated blood pressure again in the future, I would have her take her blood pressure medicines that she has not taken it and wait an hour before coming to the emergency department.  I did express this to the patient's daughter and patient will be discharged at this time.    Assessments & Plan (with Medical Decision Making)  Labile hypertension     I have reviewed the nursing notes.    I have reviewed the findings, diagnosis, plan and need for follow up with the patient.          Final diagnoses:   Labile secondary hypertension       7/11/2022   [facility] EMERGENCY DEPT     Alejandro Shin  MD Jeff  07/11/22 [id number]

## 2022-07-11 NOTE — DISCHARGE INSTRUCTIONS
1.  I would contact your doctor tomorrow to see if further changes to your blood pressure medications need to be made.  Please make sure that you take your medications as prescribed.  Make sure you take your metoprolol tonight at bedtime.

## 2022-07-11 NOTE — ED TRIAGE NOTES
Pt presents with hypertension. [facility] sent pt in. Pt lives alone in trailer in [place]. Pt fell approx 1 week ago.Bruising to left face. Pt alert and orientated.Walks well with walker. Pt states hasn't take BP med today or possibly yesterday. Pt has meals on wheels.      Triage Assessment     Row Name 07/11/22 3152       Triage Assessment (Adult)    Airway WDL WDL       Respiratory WDL    Respiratory WDL WDL       Skin Circulation/Temperature WDL    Skin Circulation/Temperature WDL WDL       Cardiac WDL    Cardiac WDL WDL       Peripheral/Neurovascular WDL    Peripheral Neurovascular WDL WDL       Cognitive/Neuro/Behavioral WDL    Cognitive/Neuro/Behavioral WDL WDL

## 2022-07-12 NOTE — TELEPHONE ENCOUNTER
Reason for Call: Request for an order or referral:    Order or referral being requested: Agnes Matthews (patients daughter) called and stated that [facility] and determined that the patient needs 24 hour care. Requesting orders for placement into memory care.     Date needed: as soon as possible    Has the patient been seen by the PCP for this problem? YES    Additional comments: Agnes's number is (H) [phone] (C) [phone]    Phone number Patient can be reached at:  Home number on file [phone] (home)    Best Time:  any    Can we leave a detailed message on this number?  YES    Call taken on 7/12/2022 at 3:13 PM by Yakelin Murdock

## 2022-07-12 NOTE — TELEPHONE ENCOUNTER
Reason for Call:  Home Health Care     with [facility] (formeraly [facility] called regarding (reason for call): Verbal Orders    : evaluation       Phone Number Homecare Nurse can be reached at: [phone]    Can we leave a detailed message on this number? YES    Phone number patient can be reached at: Cell number on file:    [phone]       Best Time: any    Call taken on 7/12/2022 at 11:40 AM by Yakelin Murdock

## 2022-07-13 NOTE — TELEPHONE ENCOUNTER
NORA for Shelby(Daughter) to call back [phone]. Please advise of message below from Dr. Dangelo.      Please call and give verbal orders for patient to be admitted to memory care.

## 2022-07-14 NOTE — TELEPHONE ENCOUNTER
Daughter dilan called and advised. Wondering if you can put in a  Referral for pt to stay @ short term memory care Facility  Only needs for July -end of sept roughly as she has a place reserved for her in [place] but wont be built until sept.     Daughter checked with insurance and they will cover anywhere. She checked with [facility] in [place] and they are full. Would like to start with [facility] to see if they have any openings and go from there if they dont.     Call daughter dilan with updates.

## 2022-07-15 NOTE — TELEPHONE ENCOUNTER
I called daughter and discussed concerns . She will have [facility] fax me the forms needed.  Will await these forms

## 2022-07-21 NOTE — TELEPHONE ENCOUNTER
Form placed in providers bin for review and signature if appropriate  Keisha Joseph MA on 7/21/2022 at 4:01 PM

## 2022-07-21 NOTE — TELEPHONE ENCOUNTER
Reason for Call:  Form, our goal is to have forms completed with 72 hours, however, some forms may require a visit or additional information.    Type of letter, form or note:  medical    Who is the form from?: Home care    Where did the form come from: form was faxed in    What clinic location was the form placed at?: [facility] [phone]    Where the form was placed: Team B Box/Folder    What number is listed as a contact on the form?: [phone]       Additional comments: Please complete form and return to [phone]    Call taken on 7/21/2022 at 1:56 PM by Kacie Esparza

## 2022-07-22 NOTE — TELEPHONE ENCOUNTER
[facility] in [facility] called and stated that they need this by 4:30 today    Note Text (......Xxx Chief Complaint.): This diagnosis correlates with the Detail Level: Zone Other (Free Text): Well controlled using spironolactone and retin a

## 2022-07-25 NOTE — TELEPHONE ENCOUNTER
[facility] calling back. They are still waiting on this form. Please fax.    NESTOR CurtisN, RN, PHN  [facility] ~ Registered Nurse  Clinic Triage ~ [place] [facility] [place]  July 25, 2022

## 2022-07-25 NOTE — TELEPHONE ENCOUNTER
Reason for Call:  Form, our goal is to have forms completed with 72 hours, however, some forms may require a visit or additional information.    Type of letter, form or note:  Home Health Certification    Who is the form from?: Home care    Where did the form come from: form was faxed in    What clinic location was the form placed at?: [facility] [phone]    Where the form was placed: Team B Box/Folder    What number is listed as a contact on the form?: [phone]       Additional comments: Please complete form and return to [phone]    Call taken on 7/25/2022 at 3:21 PM by Kacie Esparza

## 2022-07-26 NOTE — TELEPHONE ENCOUNTER
Reason for Call:  Form, our goal is to have forms completed with 72 hours, however, some forms may require a visit or additional information.    Type of letter, form or note:  medical    Who is the form from?: Home care    Where did the form come from: form was faxed in    What clinic location was the form placed at?: [facility] [phone]    Where the form was placed: Team B Box/Folder    What number is listed as a contact on the form?: [phone]       Additional comments: Please complete form and return to [phone]    Call taken on 7/26/2022 at 2:04 PM by Kacie Esparza

## 2022-08-03 NOTE — PROGRESS NOTES
SUBJECTIVE:                                                    Nina Villalpando is a 86 year old female who presents to clinic today for the following health issues:    HPI    Hyperlipidemia Follow-Up      Rate your low fat/cholesterol diet?: fair    Taking statin?  Yes, no muscle aches from statin    Other lipid medications/supplements?:  none     Hypertension Follow-up      Outpatient blood pressures are being checked at home.  Has not checked recently.    Low Salt Diet: low salt     Remembers to take her hydralazine three times daily about half of her days, does make her tired.  She did get all 3 doses in yesterday and has had her morning meds.  She denies headache, chest pain or shortness of breath.       Problem list and histories reviewed & adjusted, as indicated.  Additional history: as documented    Patient Active Problem List   Diagnosis     Generalized osteoarthrosis, unspecified site     Advanced directives, counseling/discussion     Varicose veins of legs     HTN, goal below 150/90     Chronic rhinitis     CHAU (renal artery stenosis) (H)     Hair loss     Health Care Home     Aortic stenosis     Hyperlipidemia, unspecified     Past Surgical History:   Procedure Laterality Date     C NONSPECIFIC PROCEDURE  1980    Hysterectomy due to menometrorrhagia.  Bladder repair.     CATARACT IOL, RT/LT  6/26/2008    left eye     CATARACT IOL, RT/LT  7/24/2008    right eye     COLONOSCOPY  04/16/2007     HERNIA REPAIR, UMBILICAL  09/27/2007    Incarcerated.     LASER YAG CAPSULOTOMY  5/22/2014    Procedure: LASER YAG CAPSULOTOMY;  Surgeon: Hebert Ledbetter MD;  Location:  OR       Social History   Substance Use Topics     Smoking status: Never Smoker     Smokeless tobacco: Never Used      Comment: no smokers in household     Alcohol use Yes      Comment: marcelo/waterx1-2/x1-2 q couple of months     Family History   Problem Relation Age of Onset     DIABETES Maternal Grandmother      JACK. Sister       "HEART DISEASE Brother      DIABETES Mother      C.A.D. Father      HEART DISEASE Brother      HEART DISEASE Brother      HEART DISEASE Sister      Respiratory Sister      DIABETES Daughter      Anesthesia Reaction No family hx of      Asthma No family hx of      Hypertension No family hx of      Breast Cancer No family hx of      Alcohol/Drug No family hx of      Alzheimer Disease No family hx of      Prostate Cancer No family hx of      Cancer - colorectal No family hx of      CEREBROVASCULAR DISEASE No family hx of      Allergies No family hx of      Arthritis No family hx of      Cardiovascular No family hx of      Circulatory No family hx of      Depression No family hx of      Endocrine Disease No family hx of      Genetic Disorder No family hx of      GASTROINTESTINAL DISEASE No family hx of      Genitourinary Problems No family hx of      Blood Disease No family hx of      CANCER No family hx of      Congenital Anomalies No family hx of      Connective Tissue Disorder No family hx of      Eye Disorder No family hx of      Gynecology No family hx of      Obesity No family hx of      OSTEOPOROSIS No family hx of      Psychotic Disorder No family hx of      Musculoskeletal Disorder No family hx of      Lipids No family hx of      Neurologic Disorder No family hx of      Thyroid Disease No family hx of      Hearing Loss No family hx of            ROS:  C: NEGATIVE for fever, chills, change in weight  E/M: she complains of chronic nasal congestion and post nasal drip.  R: NEGATIVE for significant cough or SOB  CV: NEGATIVE for chest pain, palpitations or peripheral edema    OBJECTIVE:                                                    /72  Pulse 64  Temp 96.7  F (35.9  C) (Temporal)  Resp 16  Ht 4' 11.96\" (1.523 m)  Wt 172 lb (78 kg)  BMI 33.64 kg/m2  Body mass index is 33.64 kg/(m^2).  Gen: no apparent distress  HENT: Ears normal. Throat and pharynx normal. Neck supple. No adenopathy or masses in the " neck or supraclavicular regions. Sinuses non tender. Nose congested with clear discharge.  Chest: clear to auscultation without wheeze, rale or rhonchi  Cor: regular rate and rhythm without murmur  Ext: warm and dry without edema  Psych: Alert and oriented times 3; coherent speech, normal   rate and volume, able to articulate logical thoughts, able   to abstract reason, no tangential thoughts, no hallucinations   or delusions  Her affect is bright.     ASSESSMENT/PLAN:                                                      1. HTN, goal below 150/90  Controlled, no change in medication, encouraged her to get the hydralazine in three times daily more often.    - metoprolol (TOPROL-XL) 100 MG 24 hr tablet; TAKE ONE & ONE-HALF TABLETS BY MOUTH ONCE DAILY AT BEDTIME  Dispense: 135 tablet; Refill: 3  - hydrALAZINE (APRESOLINE) 50 MG tablet; Take 1 tablet (50 mg) by mouth 3 times daily  Dispense: 270 tablet; Refill: 1  - cloNIDine (CATAPRES) 0.2 MG tablet; Take 1 tablet (0.2 mg) by mouth 2 times daily  Dispense: 180 tablet; Refill: 3    2. Chronic rhinitis  Nasal steroids gave her headache.  Will try nasal saline.    3. Hyperlipidemia, unspecified  Remains on statin.    - atorvastatin (LIPITOR) 20 MG tablet; Take 1 tablet (20 mg) by mouth daily  Dispense: 90 tablet; Refill: 3    Patricia Norman MD  [facility]   normal

## 2022-08-05 NOTE — TELEPHONE ENCOUNTER
Daughter is calling needs a form completed by provider.  A Physician statement of Guardianship for her mother and then needs to get it to the [facility].  She will be bringing the letter in today. She is coming from [place].  If you can fill this out and she would be able to  Monday and drive to the [facility] to drop it off.     Ira Gilbert RN  [facility] ~ Registered Nurse  Clinic Triage ~ [place] [facility] [place]  August 5, 2022

## 2022-08-05 NOTE — TELEPHONE ENCOUNTER
Form placed in providers bin for review and signature if appropriate  Keisha Joseph MA on 8/5/2022 at 12:27 PM

## 2022-08-05 NOTE — TELEPHONE ENCOUNTER
Called Shelby, informed her that the form for her mom is completed.   She will be down on Monday to pick it up.  Form copied, original placed at the , copy sent to cordelia Joseph MA on 8/5/2022 at 2:07 PM

## 2022-08-05 NOTE — TELEPHONE ENCOUNTER
If you have any questions her daughter Shelyb Matthews can be reached at [phone] or [phone].

## 2022-08-15 NOTE — TELEPHONE ENCOUNTER
Forms/Letter Request    Type of form/letter: [facility]    Have you been seen for this request: N/A    Do we have the form/letter: Yes: Team B bin    When is form/letter needed by: N/a    How would you like the form/letter returned: Fax    Patient Notified form requests are processed in 3-5 business days:No    Fax [phone]

## 2022-08-17 NOTE — TELEPHONE ENCOUNTER
Forms/Letter Request    Type of form/letter: [facility]    Have you been seen for this request: N/A    Do we have the form/letter: Yes: Team B bin     When is form/letter needed by: N/a     How would you like the form/letter returned: Fax    Patient Notified form requests are processed in 3-5 business days:No    Fax [phone]

## 2022-08-18 NOTE — TELEPHONE ENCOUNTER
Duplicate form. First form received 8/16/22. First form signed and faxed back. Closing encounter for administrative purposes.    Marcia Medved

## 2022-08-26 NOTE — TELEPHONE ENCOUNTER
"Patient tested positive for covid on 8/23/22.  Symptoms: mild cough and chest congestion and runny nose  No increased temperature   Daughter would like to move forward with the antiviral treatment, Paxiovid.   Can they please have an order for this,   Order can state \"administer per package instructions\"    Form placed in providers bin for review and signature if appropriate  Keisha Joseph MA on 8/26/2022 at 4:45 PM    "

## 2022-08-26 NOTE — TELEPHONE ENCOUNTER
Cody nurse from [facility] is calling regarding covid treatment Paxlovid for patient.    Form sent 8/26 for Paxlovid treatment. Currently in bin.    Patient tested positive for Covid 8/24/22 and is having some symptoms, like cough, fever.    Needs to be faxed back to   [phone]  ATTN:  Nurse Ira Gilbert RN  [facility] ~ Registered Nurse  Clinic Triage ~ [place] [facility] [place]  August 26, 2022

## 2022-08-26 NOTE — TELEPHONE ENCOUNTER
She needs a visit to review medications before I can prescribe these.  Can add on a telephone visit today.

## 2022-08-26 NOTE — TELEPHONE ENCOUNTER
Forms/Letter Request    Type of form/letter: [facility]     Have you been seen for this request: N/A    Do we have the form/letter: Yes: Team B bin     When is form/letter needed by: N/a     How would you like the form/letter returned: Fax    Patient Notified form requests are processed in 3-5 business days:No    Fax [phone]

## 2022-08-26 NOTE — TELEPHONE ENCOUNTER
RX sent to a pharmacy in [place] and patient resides in an [facility] in [facility]   RX successfully e scribed to pharmacy of choice  Neli Gutiérrez RN  FNA       Reason for Disposition    [1] Prescription prescribed recently is not at pharmacy AND [2] triager has access to patient's EMR AND [3] prescription is recorded in the EMR     Sent to a pharmacy in  [place]; patient is resident in [facility] in  [facility]    Additional Information    Negative: [1] Intentional drug overdose AND [2] suicidal thoughts or ideas    Protocols used: MEDICATION REFILL AND RENEWAL CALL-A-AH, MEDICATION QUESTION CALL-A-AH

## 2022-08-27 NOTE — TELEPHONE ENCOUNTER
TELEPHONE CALL -    Reason for call-     Prescription     Paige PANDA [phone]   calling from Pt's living facility    She has received the PAXLOVID for Pt - but she needs an order to be fax to place it on the MAR.  NO FAX available over  - She was able to take a verbal order from this RN     (PAXLOVID) therapy pack    Sent to pharmacy as: Nirmatrelvir&Ritonavir 300/100 20 x 150 MG & 10 x 100MG Oral Tablet Therapy Pack (PAXLOVID)    Route: Take 2 tablets by mouth 2 times daily (Take 1 tablet of Nirmatelvir and 1 tablet of Ritonavir twice daily for 5 days) - Oral    Risk criteria met: Yes;   Positive Covid-19 test: Yes;   Weight >40 kg Yes;   Renal fxn: abnormal;dose adjsuted      Josie Aceves RN Nurse Triage Advisor 4:14 PM 8/27/2022

## 2022-08-27 NOTE — TELEPHONE ENCOUNTER
Reason for Disposition    Caller has medicine question only, adult not sick, AND triager answers question    Protocols used: MEDICATION QUESTION CALL-A-AH

## 2022-08-29 NOTE — TELEPHONE ENCOUNTER
I called patient's daughter who has consent to communicate on Friday evening and reviewed her medication list and sent the Paxil with over to the pharmacy.  She does not need a visit

## 2022-08-29 NOTE — TELEPHONE ENCOUNTER
Forms/Letter Request    Type of form/letter: [facility]     Have you been seen for this request: N/A    Do we have the form/letter: Yes: Team B bin     When is form/letter needed by: N/a    How would you like the form/letter returned: Fax    Patient Notified form requests are processed in 3-5 business days:No    Fax [phone]

## 2022-08-29 NOTE — TELEPHONE ENCOUNTER
Staff called DTR and relayed message and asked she call back to schedule a virtual visit per note below

## 2022-09-28 NOTE — PLAN OF CARE
PRIMARY DIAGNOSIS: GENERALIZED WEAKNESS    OUTPATIENT/OBSERVATION GOALS TO BE MET BEFORE DISCHARGE  1. Orthostatic performed: No    2. Tolerating PO medications: Yes    3. Return to near baseline physical activity: No-- weak. Using walker. To be evaluated by PT/ OT tomorrow     4. Cleared for discharge by consultants (if involved): No    Discharge Planner Nurse   Safe discharge environment identified: No  Barriers to discharge: No-- to be seen by PT/ OT in the morning.        Entered by: Tracee Davis 04/07/2022 10:25 PM     Please review provider order for any additional goals.   Nurse to notify provider when observation goals have been met and patient is ready for discharge.     Purse String (Intermediate) Text: Given the location of the defect and the characteristics of the surrounding skin a purse string intermediate closure was deemed most appropriate.  Undermining was performed circumfirentially around the surgical defect.  A purse string suture was then placed and tightened.

## 2022-11-17 NOTE — TELEPHONE ENCOUNTER
"Pending Prescriptions:                       Disp   Refills    metoprolol succinate ER (TOPROL XL) 100 MG*24 tab*0        Sig: TAKE 1 TABLET BY MOUTH ONCE DAILY    Routing refill request to provider for review/approval because:  Medication is reported/historical  Requested Prescriptions   Pending Prescriptions Disp Refills    metoprolol succinate ER (TOPROL XL) 100 MG 24 hr tablet [Pharmacy Med Name: METOPROLOL SUCC  MG TAB] 24 tablet 0     Sig: TAKE 1 TABLET BY MOUTH ONCE DAILY       Beta-Blockers Protocol Failed - 11/16/2022  2:16 PM        Failed - Blood pressure under 140/90 in past 12 months     BP Readings from Last 3 Encounters:   07/11/22 (!) 165/85   06/08/22 114/64   06/03/22 (!) 143/80                 Passed - Patient is age 6 or older        Passed - Recent (12 mo) or future (30 days) visit within the authorizing provider's specialty     Patient has had an office visit with the authorizing provider or a provider within the authorizing providers department within the previous 12 mos or has a future within next 30 days. See \"Patient Info\" tab in inbasket, or \"Choose Columns\" in Meds & Orders section of the refill encounter.              Passed - Medication is active on med list           metoprolol succinate ER (TOPROL XL) 100 MG 24 hr tablet   6/3/2022  No   Sig - Route: Take 1 tablet (100 mg) by mouth daily - Oral   Class: Historical   Order: [id number]     Sangita Xiong RN on 11/17/2022 at 1:13 PM        "

## 2022-11-17 NOTE — TELEPHONE ENCOUNTER
It is my understanding that the patient has recently moved to nursing home and there is a provider at the nursing home who has been taking care of her medications.  Please route to the appropriate provider.  If she wishes to continue care at this clinic I would recommend a follow-up on her blood pressures before further refills

## 2022-11-18 NOTE — TELEPHONE ENCOUNTER
Called Shelby and she said to disregard as pt is at new home care and they are taking care of the medications.

## 2024-05-28 NOTE — MR AVS SNAPSHOT
"              After Visit Summary   8/22/2017    Nina Villalpando    MRN: [id number]           Patient Information     Date Of Birth          9/21/1930        Visit Information        Provider Department      8/22/2017 11:00 AM Christiano Gonzalez DPM [facility] Instructions    Nail Debridement    A high quality instrument makes trimming toenails MUCH easier.  Search ebay for any 5\" nail nipper manufactured by reliable brands such as Miltex, Integra or Jarit as these quality instruments will help manage difficult nails more effectively and comfortably. A physician is not necessary to trim nails even if you are taking blood thinners or are diabetic.  Your family or care givers may help manage your toenails.      Trim or sand the nails once weekly.  Do not wait until they are long and painful or trimming will become too difficult and painful and will increase your risk of complications or infection.  A course file or 120 grit sandpaper on a block can be helpful.  For very thick nails many people prefer battery operated fenton such as an Amope', Personal Pedi and Emjoi for regular use or heavy painful callouses or thick toenails.    Trim or skive any portion of nail that is thick, loose, crumbling, or not well attached. Do not tear the nail away, but rather cut them with a nail nipper.  You may follow up with your Podiatric Physician if you have pain, bleeding, infection, questions or other concerns.      You may also contact the following Registered Nurses for further help with nail debridement and minor hygiene concnerns.  They will come to your home, trim your toenails and soak your feet, as well as monitor for any complications that would require evaluation by a Physician.      [employer]  Alayna Olivas RN, [facility]  [phone]  [email or web address]    [employer]  Emeli Betancourt RN  Office [phone]  [email or web address]    [employer] by Estrella De La Rosa " Left message on machine for patient and Dr. Mehta phone number [phone]   Ashley, JOANNA  [phone]  Call or text for appointment        Calluses, Corns, IPKs, Porokeratosis    When there is excessive friction or pressure on the skin, the body responds by making the skin thicker.  While this may protect the deeper structures, the thickened skin can take up more space and thus increase pressure over a bony prominence or become an open sore or skin ulcer as this skin becomes less flexible.    Flat, diffuse thickening are simple calluses and they are usually caused by friction.  Often these are the result of rubbing on a shoe or going barefoot.    Calluses with a central core between the toes are called corns.  These often result from prominent joints on adjacent toes rubbing together.  Theses are often a symptom of bone malalignment and will usually recur unless the underlying bones are addressed.    Many of these lesions can be kept comfortable with routine maintenance. This consists of filing them with a Ped Egg, callus file, or 120 grit sandpaper on a block, every day during your bath or shower.  Most people prefer battery operated fenton such as an Amope', Personal Pedi and Emjoi for regular use or heavy painful callouses.  Heavy creams or ointments can be applied 1-2 times every day to keep them soft. Toe spacers can be used for corns, gel pads can be used for other lesions on the bottom of the foot. If there is a deformity noted, such as a prominent bone, often this can be addressed to minimize recurrence. However, sometimes the pressure and lesion simply migrates to another spot after surgery, so it is not a guaranteed cure.     If you have severe callouses and cracking, you may apply heavy greasy ointments that you scoop up such as Cetaphil, Eucerin, Aquaphor or Vaseline.  For more aggressive help apply heavy creams or ointment under occlusive dressings such as Saran Wrap or Jelly Feet while sleeping.   Jelly Feet can be obtained at [email or web address] [email or web address].     To be successful with  managing hyperkeratotic skin, you must manage hygiene daily.  At your bath or shower time is the easiest time to work on this when skin is most soft.  There is no medical or surgical treatment that will absolutely eliminate many of these symptoms.      [employer] is a reliable source for all sorts of foot pads, cushions, or interdigital spacers and foot appliances. Go to www.pedSeisquare or request a catalog at 4-589-[employer].        Please call with any additional questions.               Reliable shoe stores: To maximize your experience and provide the best possible fit.  Be sure to show them your foot concerns and tell them Dr. Gonzalez sent you.      Stores listed in bold have only athletic shoes, and stores that are not bold are mostly casual or variety of shoes    [employer]  [address] [place] [address]  [phone]     [employer] [facility]  [address] [place] [address]  [phone]     [employer]  [address] [place] [address]  [phone]    [employer]  [address] [facility] [place] [address]  [phone]    [employer]  [address] [place], [place] [address] [phone]    [employer]  [address] [place] [address]  [phone]                         [employer] Locations:     [address] [place] [address]   [phone]     [address] [place] [address]   [phone]     [address] [place] [address]   [phone]     [address] [place] [address]   [phone]     [address] [place] [address]   [phone]     [address] [place] [address]   [phone]     [address] [address] [facility] [address] [address]   [place] [address]   [phone]     [address]   [place] [address]   [phone]             How to find reasonable shoes          The correct width    Correct Fitting    Correct Length      Foot Distortion     "Posture Distortion                          Torsional Rigidity      Grasp behind the heel and underneath the foot and twist      Bad    Excessive torsion/twist in midfoot     Less torsion/twist in midfoot is better                   Heel Counter Rigidity      Grasp just above   midsole and squeeze      Bad    Soft heel counter      Good    Rigid Heel Counter      Flexion Rigidity      Grasp shoe and bend from forefoot to rearfoot                        Follow-ups after your visit        Who to contact     If you have questions or need follow up information about today's clinic visit or your schedule please contact [facility] directly at [phone].  Normal or non-critical lab and imaging results will be communicated to you by [employer], letter or phone within 4 business days after the clinic has received the results. If you do not hear from us within 7 days, please contact the clinic through [employer] or phone. If you have a critical or abnormal lab result, we will notify you by phone as soon as possible.  Submit refill requests through [employer] or call your pharmacy and they will forward the refill request to us. Please allow 3 business days for your refill to be completed.          Additional Information About Your Visit        [employer] Information     [employer] lets you send messages to your doctor, view your test results, renew your prescriptions, schedule appointments and more. To sign up, go to [email or web address] . Click on \"Log in\" on the left side of the screen, which will take you to the Welcome page. Then click on \"Sign up Now\" on the right side of the page.     You will be asked to enter the access code listed below, as well as some personal information. Please follow the directions to create your username and password.     Your access code is: [email or web address]  Expires: 2017  2:53 PM     Your access code will  in 90 days. If you need help or a new code, please call your [facility] " "clinic or [phone].        Care EveryWhere ID     This is your Care EveryWhere ID. This could be used by other organizations to access your [facility] medical records  [id number]        Your Vitals Were     Temperature Height BMI (Body Mass Index)             97.1  F (36.2  C) (Temporal) 4' 11.76\" (1.518 m) 32.48 kg/m2          Blood Pressure from Last 3 Encounters:   08/09/17 124/78   04/25/17 140/72   03/21/17 180/72    Weight from Last 3 Encounters:   08/22/17 165 lb (74.8 kg)   08/09/17 165 lb (74.8 kg)   04/25/17 172 lb (78 kg)              Today, you had the following     No orders found for display       Primary Care Provider Office Phone # Fax #    Patricia Norman [phone]       [address]  [facility] [address]        Equal Access to Services     [facility] : Hadii tarsha jameson hadasho Soomaali, waaxda luqadaha, qaybta kaalmada adeegyada, tim angeles . So [facility] [phone].    ATENCIÓN: Si luannla espdanyelle, tiene a reaves disposición servicios gratuitos de asistencia lingüística. Llame al [phone].    We comply with applicable federal civil rights laws and [place] laws. We do not discriminate on the basis of race, color, national origin, age, disability sex, sexual orientation or gender identity.            Thank you!     Thank you for choosing [facility]  for your care. Our goal is always to provide you with excellent care. Hearing back from our patients is one way we can continue to improve our services. Please take a few minutes to complete the written survey that you may receive in the mail after your visit with us. Thank you!             Your Updated Medication List - Protect others around you: Learn how to safely use, store and throw away your medicines at www.disposemymeds.org.          This list is accurate as of: 8/22/17 11:09 AM.  Always use your most recent med list.                   Brand Name Dispense Instructions " for use Diagnosis    aspirin 81 MG tablet      Take 1 tablet by mouth daily.        atorvastatin 20 MG tablet    LIPITOR    90 tablet    Take 1 tablet (20 mg) by mouth daily    Hyperlipidemia, unspecified       biotin 1000 MCG Tabs tablet      Take 1 tablet by mouth daily.        cloNIDine 0.2 MG tablet    CATAPRES    180 tablet    Take 1 tablet (0.2 mg) by mouth 2 times daily    HTN, goal below 150/90       COMPRESSION STOCKINGS     2 each    Lower extremities compression stockings Below the knees Mild pressure 15-20 MMHG    Varicose veins of legs       Glucosamine-Chondroitin Tabs     0    1 tablet qd        hydrALAZINE 50 MG tablet    APRESOLINE    270 tablet    Take 1 tablet (50 mg) by mouth 3 times daily    HTN, goal below 150/90       metoprolol 100 MG 24 hr tablet    TOPROL-XL    135 tablet    TAKE ONE & ONE-HALF TABLETS BY MOUTH ONCE DAILY AT BEDTIME    HTN, goal below 150/90       MULTI-DAY Tabs           order for DME     1 Device    Equipment being ordered: Blood pressure monitor    HTN, goal below 150/90       vitamin D 1000 UNITS capsule      Take 1 capsule by mouth daily Not taking every day        vitamin E 400 UNIT capsule     0    1 qd